# Patient Record
Sex: MALE | Race: WHITE | Employment: OTHER | URBAN - METROPOLITAN AREA
[De-identification: names, ages, dates, MRNs, and addresses within clinical notes are randomized per-mention and may not be internally consistent; named-entity substitution may affect disease eponyms.]

---

## 2017-02-09 ENCOUNTER — TELEPHONE (OUTPATIENT)
Dept: INTERNAL MEDICINE CLINIC | Facility: CLINIC | Age: 62
End: 2017-02-09

## 2017-02-09 DIAGNOSIS — Z12.11 COLON CANCER SCREENING: Primary | ICD-10-CM

## 2017-02-14 ENCOUNTER — TELEPHONE (OUTPATIENT)
Dept: INTERNAL MEDICINE CLINIC | Facility: CLINIC | Age: 62
End: 2017-02-14

## 2017-02-14 RX ORDER — OSELTAMIVIR PHOSPHATE 75 MG/1
75 CAPSULE ORAL 2 TIMES DAILY
Qty: 10 CAPSULE | Refills: 0 | Status: SHIPPED | OUTPATIENT
Start: 2017-02-14 | End: 2017-02-24 | Stop reason: ALTCHOICE

## 2017-02-14 NOTE — TELEPHONE ENCOUNTER
Has a very dry cough, head congestion, and extreme fatigue, no other symptoms.       OSCO----JERI TIMMONS    Will let patient know if meds are called to pharmacy

## 2017-02-20 RX ORDER — MONTELUKAST SODIUM 10 MG/1
TABLET ORAL
Qty: 30 TABLET | Refills: 11 | Status: SHIPPED | OUTPATIENT
Start: 2017-02-20 | End: 2018-02-14

## 2017-02-24 ENCOUNTER — OFFICE VISIT (OUTPATIENT)
Dept: GASTROENTEROLOGY | Facility: CLINIC | Age: 62
End: 2017-02-24

## 2017-02-24 ENCOUNTER — OFFICE VISIT (OUTPATIENT)
Dept: INTERNAL MEDICINE CLINIC | Facility: CLINIC | Age: 62
End: 2017-02-24

## 2017-02-24 ENCOUNTER — HOSPITAL ENCOUNTER (EMERGENCY)
Facility: HOSPITAL | Age: 62
Discharge: HOME | End: 2017-02-25
Attending: EMERGENCY MEDICINE
Payer: COMMERCIAL

## 2017-02-24 ENCOUNTER — TELEPHONE (OUTPATIENT)
Dept: GASTROENTEROLOGY | Facility: CLINIC | Age: 62
End: 2017-02-24

## 2017-02-24 VITALS
SYSTOLIC BLOOD PRESSURE: 180 MMHG | WEIGHT: 251 LBS | BODY MASS INDEX: 32.21 KG/M2 | DIASTOLIC BLOOD PRESSURE: 104 MMHG | HEART RATE: 76 BPM | HEIGHT: 74 IN

## 2017-02-24 VITALS
DIASTOLIC BLOOD PRESSURE: 80 MMHG | HEIGHT: 74 IN | BODY MASS INDEX: 32.21 KG/M2 | SYSTOLIC BLOOD PRESSURE: 160 MMHG | WEIGHT: 251 LBS | TEMPERATURE: 98 F | HEART RATE: 88 BPM

## 2017-02-24 DIAGNOSIS — I10 ESSENTIAL HYPERTENSION, BENIGN: Primary | ICD-10-CM

## 2017-02-24 DIAGNOSIS — Z80.0 FAMILY HISTORY OF COLON CANCER: ICD-10-CM

## 2017-02-24 DIAGNOSIS — I10 ESSENTIAL HYPERTENSION: Primary | ICD-10-CM

## 2017-02-24 DIAGNOSIS — Z12.11 ENCOUNTER FOR SCREENING COLONOSCOPY: Primary | ICD-10-CM

## 2017-02-24 DIAGNOSIS — E11.9 DIABETES MELLITUS WITHOUT COMPLICATION (HCC): ICD-10-CM

## 2017-02-24 LAB
BASOPHILS # BLD: 0 K/UL (ref 0–0.2)
BASOPHILS NFR BLD: 1 %
EOSINOPHIL # BLD: 0.1 K/UL (ref 0–0.7)
EOSINOPHIL NFR BLD: 2 %
ERYTHROCYTE [DISTWIDTH] IN BLOOD BY AUTOMATED COUNT: 12.9 % (ref 11–15)
HCT VFR BLD AUTO: 43.6 % (ref 41–52)
HGB BLD-MCNC: 15 G/DL (ref 13.5–17.5)
LYMPHOCYTES # BLD: 2.3 K/UL (ref 1–4)
LYMPHOCYTES NFR BLD: 34 %
MCH RBC QN AUTO: 30.8 PG (ref 27–32)
MCHC RBC AUTO-ENTMCNC: 34.3 G/DL (ref 32–37)
MCV RBC AUTO: 89.9 FL (ref 80–100)
MONOCYTES # BLD: 0.5 K/UL (ref 0–1)
MONOCYTES NFR BLD: 7 %
NEUTROPHILS # BLD AUTO: 3.9 K/UL (ref 1.8–7.7)
NEUTROPHILS NFR BLD: 56 %
PLATELET # BLD AUTO: 176 K/UL (ref 140–400)
PMV BLD AUTO: 8.3 FL (ref 7.4–10.3)
RBC # BLD AUTO: 4.85 M/UL (ref 4.5–5.9)
WBC # BLD AUTO: 6.9 K/UL (ref 4–11)

## 2017-02-24 PROCEDURE — 80053 COMPREHEN METABOLIC PANEL: CPT | Performed by: EMERGENCY MEDICINE

## 2017-02-24 PROCEDURE — 99284 EMERGENCY DEPT VISIT MOD MDM: CPT

## 2017-02-24 PROCEDURE — 85025 COMPLETE CBC W/AUTO DIFF WBC: CPT | Performed by: EMERGENCY MEDICINE

## 2017-02-24 PROCEDURE — 99212 OFFICE O/P EST SF 10 MIN: CPT | Performed by: INTERNAL MEDICINE

## 2017-02-24 PROCEDURE — 96374 THER/PROPH/DIAG INJ IV PUSH: CPT

## 2017-02-24 PROCEDURE — 96375 TX/PRO/DX INJ NEW DRUG ADDON: CPT

## 2017-02-24 PROCEDURE — 99214 OFFICE O/P EST MOD 30 MIN: CPT | Performed by: INTERNAL MEDICINE

## 2017-02-24 PROCEDURE — 99213 OFFICE O/P EST LOW 20 MIN: CPT | Performed by: PHYSICIAN ASSISTANT

## 2017-02-24 PROCEDURE — 99243 OFF/OP CNSLTJ NEW/EST LOW 30: CPT | Performed by: PHYSICIAN ASSISTANT

## 2017-02-24 PROCEDURE — 80061 LIPID PANEL: CPT | Performed by: EMERGENCY MEDICINE

## 2017-02-24 RX ORDER — LABETALOL HYDROCHLORIDE 5 MG/ML
20 INJECTION, SOLUTION INTRAVENOUS ONCE
Status: COMPLETED | OUTPATIENT
Start: 2017-02-24 | End: 2017-02-24

## 2017-02-24 RX ORDER — VALSARTAN AND HYDROCHLOROTHIAZIDE 320; 25 MG/1; MG/1
1 TABLET, FILM COATED ORAL DAILY
Qty: 30 TABLET | Refills: 11 | Status: SHIPPED | OUTPATIENT
Start: 2017-02-24 | End: 2018-03-12

## 2017-02-24 NOTE — PROGRESS NOTES
HPI:    Patient ID: Vanessa Dunlap is a 57year old male. Hypertension  This is a chronic problem. The current episode started more than 1 year ago. The problem is unchanged. The problem is controlled.  Pertinent negatives include no anxiety, blurred vi MG Oral Tab Take 1 tablet by mouth daily. Disp: 30 tablet Rfl: 11   MONTELUKAST SODIUM 10 MG Oral Tab TAKE 1 TABLET BY MOUTH NIGHTLY. Disp: 30 tablet Rfl: 11   GLIPIZIDE 10 MG Oral Tab TAKE 1 TABLET (10 MG TOTAL) BY MOUTH 2 (TWO) TIMES DAILY BEFORE MEALS. discharge. Left eye exhibits no discharge. No scleral icterus. Neck: Normal range of motion. Neck supple. No JVD present. No tracheal deviation present. No thyromegaly present.    Cardiovascular: Normal rate, regular rhythm, normal heart sounds and intact

## 2017-02-24 NOTE — ASSESSMENT & PLAN NOTE
bp not controlled. Will increase diovan hctz to 320 /25  Recheck in two weeks, cbc cmp lipid hgbaic.
Mother

## 2017-02-24 NOTE — PATIENT INSTRUCTIONS
1. Schedule colonoscopy with Dr. Eleazar Morgan with [facility] (AM procedure, preferably)    2.  bowel prep from [employer] (eRx)    3. Medication adjustment:       A. Day BEFORE colonoscopy: HOLD Metformin and Glipizide      B.  Day OF colonosco

## 2017-02-24 NOTE — H&P
[address] [facility] [address] Gastroenterology                                                                                                  Clinic History and Physical     Pa Past Surgical History    VASECTOMY      COLONOSCOPY  2012    Comment repeat in 2017      Family Hx:   Family History   Problem Relation Age of Onset   • Hypertension Father    • Diabetes Father    • Cancer Father      colon   • Heart Disorder Father Inhale  into the lungs. Disp:  Rfl:    Omega-3 Fatty Acids (OMEGA-3 FISH OIL) 1200 MG Oral Cap Take  by mouth.  Disp:  Rfl:    MetFORMIN HCl ER, OSM, (FORTAMET) 500 MG (OSM) Oral Tablet 24 Hr Take 4 tablets by mouth every evening Disp: 120 tablet Rfl: 10 year-old male patient of Dr. Dileep Crawford with history of HTN, DMII, asthma, migraines, low back pain, seasonal allergies, hx of ulcers and family hx of colon cancer (father) who presents for colon cancer screening evaluation. No anemia noted on lab work. elected to proceed with colonoscopy with intervention [i.e. polypectomy, stent placement, etc.] as indicated. Orders This Visit:  No orders of the defined types were placed in this encounter.        Meds This Visit:    Signed Prescriptions Disp Refills

## 2017-02-24 NOTE — TELEPHONE ENCOUNTER
Please call patient's wife's phone ([phone]) Monday to schedule.  Dr. Shemar morel is in [place] today,    Scheduled for:  [place] [id number]  Date:    Location:    Sedation:  MAC  Time:    Prep: Colyte  Meds/Allergies Reconciled?:  Yes   Diagnosis with code

## 2017-02-25 ENCOUNTER — APPOINTMENT (OUTPATIENT)
Dept: CT IMAGING | Facility: HOSPITAL | Age: 62
End: 2017-02-25
Attending: EMERGENCY MEDICINE
Payer: COMMERCIAL

## 2017-02-25 VITALS
SYSTOLIC BLOOD PRESSURE: 162 MMHG | DIASTOLIC BLOOD PRESSURE: 85 MMHG | HEART RATE: 68 BPM | RESPIRATION RATE: 18 BRPM | HEIGHT: 74 IN | TEMPERATURE: 98 F | WEIGHT: 246 LBS | BODY MASS INDEX: 31.57 KG/M2 | OXYGEN SATURATION: 97 %

## 2017-02-25 LAB
ALBUMIN SERPL BCP-MCNC: 4.2 G/DL (ref 3.5–4.8)
ALBUMIN/GLOB SERPL: 1.5 {RATIO} (ref 1–2)
ALP SERPL-CCNC: 51 U/L (ref 32–100)
ALT SERPL-CCNC: 51 U/L (ref 17–63)
ANION GAP SERPL CALC-SCNC: 9 MMOL/L (ref 0–18)
AST SERPL-CCNC: 38 U/L (ref 15–41)
BILIRUB SERPL-MCNC: 0.8 MG/DL (ref 0.3–1.2)
BUN SERPL-MCNC: 14 MG/DL (ref 8–20)
BUN/CREAT SERPL: 17.5 (ref 10–20)
CALCIUM SERPL-MCNC: 9.5 MG/DL (ref 8.5–10.5)
CHLORIDE SERPL-SCNC: 102 MMOL/L (ref 95–110)
CHOLEST SERPL-MCNC: 183 MG/DL (ref 110–200)
CO2 SERPL-SCNC: 28 MMOL/L (ref 22–32)
CREAT SERPL-MCNC: 0.8 MG/DL (ref 0.5–1.5)
GLOBULIN PLAS-MCNC: 2.8 G/DL (ref 2.5–3.7)
GLUCOSE SERPL-MCNC: 203 MG/DL (ref 70–99)
HDLC SERPL-MCNC: 38 MG/DL
LDLC SERPL CALC-MCNC: 106 MG/DL (ref 0–99)
NONHDLC SERPL-MCNC: 145 MG/DL
OSMOLALITY UR CALC.SUM OF ELEC: 294 MOSM/KG (ref 275–295)
POTASSIUM SERPL-SCNC: 3.7 MMOL/L (ref 3.3–5.1)
PROT SERPL-MCNC: 7 G/DL (ref 5.9–8.4)
SODIUM SERPL-SCNC: 139 MMOL/L (ref 136–144)
TRIGL SERPL-MCNC: 195 MG/DL (ref 1–149)

## 2017-02-25 PROCEDURE — 70450 CT HEAD/BRAIN W/O DYE: CPT

## 2017-02-25 RX ORDER — ACETAMINOPHEN 325 MG/1
650 TABLET ORAL ONCE
Status: COMPLETED | OUTPATIENT
Start: 2017-02-25 | End: 2017-02-25

## 2017-02-25 RX ORDER — HYDRALAZINE HYDROCHLORIDE 20 MG/ML
5 INJECTION INTRAMUSCULAR; INTRAVENOUS ONCE
Status: COMPLETED | OUTPATIENT
Start: 2017-02-25 | End: 2017-02-25

## 2017-02-25 NOTE — ED INITIAL ASSESSMENT (HPI)
Pt was here earlier this morning for HTN, went to PCP, back because its still high and pt reports headaches

## 2017-02-25 NOTE — ED PROVIDER NOTES
Patient Seen in: [facility] Emergency Department    History   Patient presents with:  Hypertension (cardiovascular)    Stated Complaint: high blood pressure     HPI    Patient is a 39-year-old male who presents with diffuse headache ×1 day.   Adan Blood (SEPIDEH CONTOUR TEST) In Vitro Strip,  Test twice a day   SEPIDEH MICROLET LANCETS Does not apply Misc,  Test twice a day   Albuterol Sulfate HFA (PROAIR HFA) 108 (90 BASE) MCG/ACT Inhalation Aero Soln,  Inhale  into the lungs.    alprazolam Pravin Dill) 0.5 motor strength in all extremities, no focal deficits, no pronator drift  SKIN: warm, dry, no rashes        ED Course     Labs Reviewed   COMP METABOLIC PANEL (14) - Abnormal; Notable for the following:     Glucose 203 (*)     All other components within no

## 2017-02-27 ENCOUNTER — TELEPHONE (OUTPATIENT)
Dept: INTERNAL MEDICINE CLINIC | Facility: CLINIC | Age: 62
End: 2017-02-27

## 2017-02-27 ENCOUNTER — OFFICE VISIT (OUTPATIENT)
Dept: INTERNAL MEDICINE CLINIC | Facility: CLINIC | Age: 62
End: 2017-02-27

## 2017-02-27 VITALS
HEART RATE: 80 BPM | WEIGHT: 246 LBS | DIASTOLIC BLOOD PRESSURE: 90 MMHG | BODY MASS INDEX: 31.57 KG/M2 | HEIGHT: 74 IN | SYSTOLIC BLOOD PRESSURE: 150 MMHG

## 2017-02-27 DIAGNOSIS — I10 ESSENTIAL HYPERTENSION, BENIGN: Primary | ICD-10-CM

## 2017-02-27 PROCEDURE — 99212 OFFICE O/P EST SF 10 MIN: CPT | Performed by: INTERNAL MEDICINE

## 2017-02-27 PROCEDURE — 99214 OFFICE O/P EST MOD 30 MIN: CPT | Performed by: INTERNAL MEDICINE

## 2017-02-27 RX ORDER — METOPROLOL SUCCINATE 25 MG/1
25 TABLET, EXTENDED RELEASE ORAL DAILY
Qty: 30 TABLET | Refills: 3 | Status: SHIPPED | OUTPATIENT
Start: 2017-02-27 | End: 2017-03-10 | Stop reason: DRUGHIGH

## 2017-02-27 NOTE — PROGRESS NOTES
HPI:    Patient ID: Angie Sinha is a 57year old male. Hypertension  This is a chronic problem. The current episode started more than 1 year ago. The problem has been gradually improving since onset. The problem is uncontrolled.  Associated symptoms ADULT OR) Take by mouth. Disp:  Rfl:    PEG 3350-KCl-NaBcb-NaCl-NaSulf (COLYTE WITH FLAVOR PACKS) 240 g Oral Recon Soln Take as directed - refer to GI consult notes from 2/24 for complete prep instructions.  Disp: 1 Bottle Rfl: 0   MONTELUKAST SODIUM 10 MG exhibits no discharge. No scleral icterus. Neck: Normal range of motion. Neck supple. No JVD present. No tracheal deviation present. No thyromegaly present. Cardiovascular: Normal rate, regular rhythm, normal heart sounds and intact distal pulses.   Exa

## 2017-03-03 ENCOUNTER — TELEPHONE (OUTPATIENT)
Dept: GASTROENTEROLOGY | Facility: CLINIC | Age: 62
End: 2017-03-03

## 2017-03-03 DIAGNOSIS — Z80.0 FAMILY HISTORY OF COLON CANCER: Primary | ICD-10-CM

## 2017-03-03 DIAGNOSIS — Z12.11 COLON CANCER SCREENING: ICD-10-CM

## 2017-03-04 NOTE — TELEPHONE ENCOUNTER
Scheduled for:  Colonoscopy 645[address]  Date:  4/13/17  Location:  [facility]  Sedation:  MAC  Time:  2613, since this patient is DM I specified on scheduling form for early procedure time.   Prep:  Colyte, patient given instructions at the time of OV  Meds/Allergies R

## 2017-03-10 ENCOUNTER — OFFICE VISIT (OUTPATIENT)
Dept: INTERNAL MEDICINE CLINIC | Facility: CLINIC | Age: 62
End: 2017-03-10

## 2017-03-10 VITALS
TEMPERATURE: 98 F | HEIGHT: 74 IN | SYSTOLIC BLOOD PRESSURE: 140 MMHG | DIASTOLIC BLOOD PRESSURE: 100 MMHG | BODY MASS INDEX: 31.7 KG/M2 | HEART RATE: 76 BPM | WEIGHT: 247 LBS

## 2017-03-10 DIAGNOSIS — I10 ESSENTIAL HYPERTENSION, BENIGN: Primary | ICD-10-CM

## 2017-03-10 DIAGNOSIS — E11.9 DIABETES MELLITUS WITHOUT COMPLICATION (HCC): ICD-10-CM

## 2017-03-10 DIAGNOSIS — Z12.5 PROSTATE CANCER SCREENING: ICD-10-CM

## 2017-03-10 DIAGNOSIS — G47.33 OSA (OBSTRUCTIVE SLEEP APNEA): ICD-10-CM

## 2017-03-10 LAB
ALBUMIN SERPL BCP-MCNC: 4.1 G/DL (ref 3.5–4.8)
ALBUMIN/GLOB SERPL: 1.5 {RATIO} (ref 1–2)
ALP SERPL-CCNC: 49 U/L (ref 32–100)
ALT SERPL-CCNC: 53 U/L (ref 17–63)
ANION GAP SERPL CALC-SCNC: 8 MMOL/L (ref 0–18)
AST SERPL-CCNC: 35 U/L (ref 15–41)
BILIRUB SERPL-MCNC: 0.7 MG/DL (ref 0.3–1.2)
BUN SERPL-MCNC: 15 MG/DL (ref 8–20)
BUN/CREAT SERPL: 16.9 (ref 10–20)
CALCIUM SERPL-MCNC: 8.7 MG/DL (ref 8.5–10.5)
CHLORIDE SERPL-SCNC: 105 MMOL/L (ref 95–110)
CO2 SERPL-SCNC: 27 MMOL/L (ref 22–32)
CREAT SERPL-MCNC: 0.89 MG/DL (ref 0.5–1.5)
GLOBULIN PLAS-MCNC: 2.7 G/DL (ref 2.5–3.7)
GLUCOSE SERPL-MCNC: 265 MG/DL (ref 70–99)
HBA1C MFR BLD: 8.1 % (ref 4–6)
OSMOLALITY UR CALC.SUM OF ELEC: 300 MOSM/KG (ref 275–295)
POTASSIUM SERPL-SCNC: 3.5 MMOL/L (ref 3.3–5.1)
PROT SERPL-MCNC: 6.8 G/DL (ref 5.9–8.4)
PSA SERPL-MCNC: 1.5 NG/ML (ref 0–4)
SODIUM SERPL-SCNC: 140 MMOL/L (ref 136–144)

## 2017-03-10 PROCEDURE — 99214 OFFICE O/P EST MOD 30 MIN: CPT | Performed by: INTERNAL MEDICINE

## 2017-03-10 PROCEDURE — 36415 COLL VENOUS BLD VENIPUNCTURE: CPT | Performed by: INTERNAL MEDICINE

## 2017-03-10 RX ORDER — ONDANSETRON 4 MG/1
4 TABLET, FILM COATED ORAL EVERY 8 HOURS PRN
Qty: 10 TABLET | Refills: 11 | Status: SHIPPED | OUTPATIENT
Start: 2017-03-10 | End: 2019-08-26

## 2017-03-10 RX ORDER — METOPROLOL SUCCINATE 50 MG/1
50 TABLET, EXTENDED RELEASE ORAL DAILY
Qty: 30 TABLET | Refills: 3 | Status: SHIPPED | OUTPATIENT
Start: 2017-03-10 | End: 2017-07-10

## 2017-03-10 NOTE — PROGRESS NOTES
HPI:    Patient ID: Dino Williamson is a 57year old male. Hypertension  This is a chronic problem. The problem is unchanged. The problem is controlled. Associated symptoms include malaise/fatigue.  Pertinent negatives include no anxiety, blurred vision, 4 mg tablet Take 1 tablet (4 mg total) by mouth every 8 (eight) hours as needed for Nausea. Disp: 10 tablet Rfl: 11   aspirin 81 MG Oral Tab Take 81 mg by mouth daily. Disp:  Rfl:    Multiple Vitamins-Minerals (MULTIVITAMIN ADULT OR) Take by mouth.  Disp: well-nourished. No distress. HENT:   Right Ear: External ear normal.   Left Ear: External ear normal.   Nose: Nose normal.   Mouth/Throat: No oropharyngeal exudate. Crowded airways  Neck 19 inches.     Eyes: Conjunctivae and EOM are normal. Pupils are e Ondansetron HCl (ZOFRAN) 4 mg tablet 10 tablet 11      Sig: Take 1 tablet (4 mg total) by mouth every 8 (eight) hours as needed for Nausea.            Imaging & Referrals:  None       PM#[id number]

## 2017-03-16 RX ORDER — GLIMEPIRIDE 4 MG/1
4 TABLET ORAL
Qty: 90 TABLET | Refills: 0 | Status: SHIPPED | OUTPATIENT
Start: 2017-03-16 | End: 2017-06-19

## 2017-03-24 ENCOUNTER — OFFICE VISIT (OUTPATIENT)
Dept: INTERNAL MEDICINE CLINIC | Facility: CLINIC | Age: 62
End: 2017-03-24

## 2017-03-24 VITALS
SYSTOLIC BLOOD PRESSURE: 132 MMHG | BODY MASS INDEX: 31.44 KG/M2 | HEART RATE: 64 BPM | DIASTOLIC BLOOD PRESSURE: 80 MMHG | HEIGHT: 74 IN | TEMPERATURE: 98 F | WEIGHT: 245 LBS

## 2017-03-24 DIAGNOSIS — E11.9 DIABETES MELLITUS WITHOUT COMPLICATION (HCC): ICD-10-CM

## 2017-03-24 DIAGNOSIS — I10 ESSENTIAL HYPERTENSION, BENIGN: Primary | ICD-10-CM

## 2017-03-24 PROCEDURE — 99214 OFFICE O/P EST MOD 30 MIN: CPT | Performed by: INTERNAL MEDICINE

## 2017-03-24 PROCEDURE — 99212 OFFICE O/P EST SF 10 MIN: CPT | Performed by: INTERNAL MEDICINE

## 2017-03-24 NOTE — PROGRESS NOTES
HPI:    Patient ID: Candice Moe is a 57year old male. Hypertension  This is a chronic problem. The current episode started more than 1 year ago. The problem is unchanged. The problem is controlled.  Pertinent negatives include no anxiety, blurred vi total) by mouth every 8 (eight) hours as needed for Nausea. Disp: 10 tablet Rfl: 11   aspirin 81 MG Oral Tab Take 81 mg by mouth daily. Disp:  Rfl:    Multiple Vitamins-Minerals (MULTIVITAMIN ADULT OR) Take by mouth.  Disp:  Rfl:    PEG 3350-KCl-NaBcb-NaCl- and moist. No oropharyngeal exudate. Eyes: Conjunctivae and EOM are normal. Pupils are equal, round, and reactive to light. Right eye exhibits no discharge. Left eye exhibits no discharge. No scleral icterus. Neck: Normal range of motion. Neck supple.

## 2017-04-18 RX ORDER — METFORMIN HYDROCHLORIDE 500 MG/1
TABLET, EXTENDED RELEASE ORAL
Qty: 120 TABLET | Refills: 11 | Status: SHIPPED | OUTPATIENT
Start: 2017-04-18 | End: 2018-03-14

## 2017-05-01 ENCOUNTER — TELEPHONE (OUTPATIENT)
Dept: INTERNAL MEDICINE CLINIC | Facility: CLINIC | Age: 62
End: 2017-05-01

## 2017-05-01 RX ORDER — AZITHROMYCIN 250 MG/1
TABLET, FILM COATED ORAL
Qty: 6 TABLET | Refills: 0 | Status: SHIPPED | OUTPATIENT
Start: 2017-05-01 | End: 2017-06-23 | Stop reason: ALTCHOICE

## 2017-05-18 NOTE — TELEPHONE ENCOUNTER
Entered into EPIC:Recall colon in 7 years per Dr. Micky Ansari. Last Colon done 4/13/17, next due 4/13/2024. Entered on snapshot to reflect [facility] recall date.

## 2017-06-05 RX ORDER — TRAMADOL HYDROCHLORIDE 50 MG/1
TABLET ORAL
Qty: 50 TABLET | Refills: 4 | Status: SHIPPED
Start: 2017-06-05 | End: 2017-12-04

## 2017-06-19 RX ORDER — GLIMEPIRIDE 4 MG/1
TABLET ORAL
Qty: 90 TABLET | Refills: 0 | Status: SHIPPED | OUTPATIENT
Start: 2017-06-19 | End: 2017-06-23

## 2017-06-23 ENCOUNTER — OFFICE VISIT (OUTPATIENT)
Dept: INTERNAL MEDICINE CLINIC | Facility: CLINIC | Age: 62
End: 2017-06-23

## 2017-06-23 VITALS
SYSTOLIC BLOOD PRESSURE: 130 MMHG | RESPIRATION RATE: 16 BRPM | DIASTOLIC BLOOD PRESSURE: 80 MMHG | HEART RATE: 58 BPM | WEIGHT: 237 LBS | BODY MASS INDEX: 30 KG/M2

## 2017-06-23 DIAGNOSIS — I10 ESSENTIAL HYPERTENSION, BENIGN: Primary | ICD-10-CM

## 2017-06-23 DIAGNOSIS — E11.9 DIABETES MELLITUS WITHOUT COMPLICATION (HCC): ICD-10-CM

## 2017-06-23 PROCEDURE — 99212 OFFICE O/P EST SF 10 MIN: CPT | Performed by: INTERNAL MEDICINE

## 2017-06-23 PROCEDURE — 99215 OFFICE O/P EST HI 40 MIN: CPT | Performed by: INTERNAL MEDICINE

## 2017-06-23 RX ORDER — GLIPIZIDE 10 MG/1
1 TABLET ORAL
Refills: 2 | COMMUNITY
Start: 2017-06-12 | End: 2017-10-03

## 2017-06-23 NOTE — PROGRESS NOTES
HPI:    Patient ID: Yahir Bustamante is a 57year old male. Hypertension  This is a chronic problem. The current episode started more than 1 year ago. The problem is unchanged. The problem is controlled.  Pertinent negatives include no anxiety, blurred vi (50 mg total) by mouth daily. Disp: 30 tablet Rfl: 3   aspirin 81 MG Oral Tab Take 81 mg by mouth daily. Disp:  Rfl:    Multiple Vitamins-Minerals (MULTIVITAMIN ADULT OR) Take by mouth.  Disp:  Rfl:    Valsartan-Hydrochlorothiazide 320-25 MG Oral Tab Take 1 icterus. Neck: Normal range of motion. Neck supple. No JVD present. No tracheal deviation present. No thyromegaly present. Cardiovascular: Normal rate, regular rhythm, normal heart sounds and intact distal pulses.   Exam reveals no gallop and no frictio

## 2017-06-27 ENCOUNTER — NURSE ONLY (OUTPATIENT)
Dept: INTERNAL MEDICINE CLINIC | Facility: CLINIC | Age: 62
End: 2017-06-27

## 2017-06-27 DIAGNOSIS — E11.9 DIABETES MELLITUS WITHOUT COMPLICATION (HCC): ICD-10-CM

## 2017-06-27 DIAGNOSIS — I10 ESSENTIAL HYPERTENSION, BENIGN: Primary | ICD-10-CM

## 2017-06-27 PROCEDURE — 36415 COLL VENOUS BLD VENIPUNCTURE: CPT | Performed by: INTERNAL MEDICINE

## 2017-07-06 RX ORDER — GLIMEPIRIDE 4 MG/1
TABLET ORAL
Qty: 90 TABLET | Refills: 3 | Status: SHIPPED | OUTPATIENT
Start: 2017-07-06 | End: 2017-10-06

## 2017-07-11 RX ORDER — METOPROLOL SUCCINATE 50 MG/1
TABLET, EXTENDED RELEASE ORAL
Qty: 30 TABLET | Refills: 11 | Status: SHIPPED | OUTPATIENT
Start: 2017-07-11 | End: 2018-06-08

## 2017-08-31 ENCOUNTER — TELEPHONE (OUTPATIENT)
Dept: INTERNAL MEDICINE CLINIC | Facility: CLINIC | Age: 62
End: 2017-08-31

## 2017-08-31 RX ORDER — AZITHROMYCIN 250 MG/1
TABLET, FILM COATED ORAL
Qty: 6 TABLET | Refills: 0 | Status: SHIPPED | OUTPATIENT
Start: 2017-08-31 | End: 2017-10-03 | Stop reason: ALTCHOICE

## 2017-10-03 ENCOUNTER — NURSE ONLY (OUTPATIENT)
Dept: INTERNAL MEDICINE CLINIC | Facility: CLINIC | Age: 62
End: 2017-10-03

## 2017-10-03 DIAGNOSIS — E78.00 PURE HYPERCHOLESTEROLEMIA: Primary | ICD-10-CM

## 2017-10-03 DIAGNOSIS — E11.9 DIABETES MELLITUS WITHOUT COMPLICATION (HCC): ICD-10-CM

## 2017-10-03 PROCEDURE — 36415 COLL VENOUS BLD VENIPUNCTURE: CPT | Performed by: INTERNAL MEDICINE

## 2017-10-06 ENCOUNTER — TELEPHONE (OUTPATIENT)
Dept: INTERNAL MEDICINE CLINIC | Facility: CLINIC | Age: 62
End: 2017-10-06

## 2017-10-06 NOTE — TELEPHONE ENCOUNTER
Pk Mccullough called because there is some confusion with Amaryl dosing. In lab note from 3/2017 the patient was to stop glipizide and change to Amaryl 4mg twice a day but prescription directions were for once a day.   The patient did not stop taking glipizide,

## 2017-10-09 RX ORDER — GLIMEPIRIDE 4 MG/1
TABLET ORAL
Qty: 180 TABLET | Refills: 3 | Status: SHIPPED | OUTPATIENT
Start: 2017-10-09 | End: 2018-11-08

## 2017-10-16 RX ORDER — AZITHROMYCIN 250 MG/1
TABLET, FILM COATED ORAL
Qty: 6 TABLET | Refills: 0 | OUTPATIENT
Start: 2017-10-16

## 2017-10-17 RX ORDER — SUMATRIPTAN 100 MG/1
TABLET, FILM COATED ORAL
Qty: 9 TABLET | Refills: 10 | Status: SHIPPED | OUTPATIENT
Start: 2017-10-17 | End: 2019-07-28

## 2017-10-20 ENCOUNTER — IMMUNIZATION (OUTPATIENT)
Dept: INTERNAL MEDICINE CLINIC | Facility: CLINIC | Age: 62
End: 2017-10-20

## 2017-10-20 DIAGNOSIS — Z23 NEED FOR VACCINATION: ICD-10-CM

## 2017-10-20 PROCEDURE — 90471 IMMUNIZATION ADMIN: CPT | Performed by: INTERNAL MEDICINE

## 2017-10-20 PROCEDURE — 90686 IIV4 VACC NO PRSV 0.5 ML IM: CPT | Performed by: INTERNAL MEDICINE

## 2017-12-04 RX ORDER — TRAMADOL HYDROCHLORIDE 50 MG/1
TABLET ORAL
Qty: 50 TABLET | Refills: 5 | Status: SHIPPED
Start: 2017-12-04 | End: 2021-03-15

## 2017-12-08 ENCOUNTER — TELEPHONE (OUTPATIENT)
Dept: INTERNAL MEDICINE CLINIC | Facility: CLINIC | Age: 62
End: 2017-12-08

## 2017-12-08 RX ORDER — AZITHROMYCIN 250 MG/1
TABLET, FILM COATED ORAL
Qty: 6 TABLET | Refills: 0 | Status: SHIPPED | OUTPATIENT
Start: 2017-12-08 | End: 2018-02-06 | Stop reason: ALTCHOICE

## 2018-01-02 ENCOUNTER — TELEPHONE (OUTPATIENT)
Dept: INTERNAL MEDICINE CLINIC | Facility: CLINIC | Age: 63
End: 2018-01-02

## 2018-01-02 NOTE — TELEPHONE ENCOUNTER
Cintia Ritchie is asking if taking Turmeric would be beneficial for Bill to take to help with insulin levels?

## 2018-02-06 ENCOUNTER — LAB ENCOUNTER (OUTPATIENT)
Dept: LAB | Age: 63
End: 2018-02-06
Attending: INTERNAL MEDICINE
Payer: COMMERCIAL

## 2018-02-06 ENCOUNTER — OFFICE VISIT (OUTPATIENT)
Dept: INTERNAL MEDICINE CLINIC | Facility: CLINIC | Age: 63
End: 2018-02-06

## 2018-02-06 VITALS
TEMPERATURE: 98 F | WEIGHT: 240 LBS | BODY MASS INDEX: 30.8 KG/M2 | HEIGHT: 74 IN | HEART RATE: 76 BPM | SYSTOLIC BLOOD PRESSURE: 134 MMHG | DIASTOLIC BLOOD PRESSURE: 80 MMHG

## 2018-02-06 DIAGNOSIS — I10 ESSENTIAL HYPERTENSION, BENIGN: ICD-10-CM

## 2018-02-06 DIAGNOSIS — E78.00 PURE HYPERCHOLESTEROLEMIA: ICD-10-CM

## 2018-02-06 DIAGNOSIS — Z00.00 ANNUAL PHYSICAL EXAM: Primary | ICD-10-CM

## 2018-02-06 DIAGNOSIS — E11.9 DIABETES MELLITUS WITHOUT COMPLICATION (HCC): ICD-10-CM

## 2018-02-06 DIAGNOSIS — Z00.00 ANNUAL PHYSICAL EXAM: ICD-10-CM

## 2018-02-06 LAB
ALBUMIN SERPL BCP-MCNC: 4.5 G/DL (ref 3.5–4.8)
ALBUMIN/GLOB SERPL: 1.6 {RATIO} (ref 1–2)
ALP SERPL-CCNC: 44 U/L (ref 32–100)
ALT SERPL-CCNC: 23 U/L (ref 17–63)
ANION GAP SERPL CALC-SCNC: 9 MMOL/L (ref 0–18)
AST SERPL-CCNC: 25 U/L (ref 15–41)
BASOPHILS # BLD: 0.1 K/UL (ref 0–0.2)
BASOPHILS NFR BLD: 1 %
BILIRUB SERPL-MCNC: 0.8 MG/DL (ref 0.3–1.2)
BUN SERPL-MCNC: 15 MG/DL (ref 8–20)
BUN/CREAT SERPL: 16.7 (ref 10–20)
CALCIUM SERPL-MCNC: 9.1 MG/DL (ref 8.5–10.5)
CHLORIDE SERPL-SCNC: 102 MMOL/L (ref 95–110)
CHOLEST SERPL-MCNC: 176 MG/DL (ref 110–200)
CO2 SERPL-SCNC: 29 MMOL/L (ref 22–32)
CREAT SERPL-MCNC: 0.9 MG/DL (ref 0.5–1.5)
CREAT UR-MCNC: 196.6 MG/DL
EOSINOPHIL # BLD: 0.1 K/UL (ref 0–0.7)
EOSINOPHIL NFR BLD: 2 %
ERYTHROCYTE [DISTWIDTH] IN BLOOD BY AUTOMATED COUNT: 12.7 % (ref 11–15)
GLOBULIN PLAS-MCNC: 2.8 G/DL (ref 2.5–3.7)
GLUCOSE SERPL-MCNC: 161 MG/DL (ref 70–99)
HCT VFR BLD AUTO: 45.1 % (ref 41–52)
HDLC SERPL-MCNC: 43 MG/DL
HGB BLD-MCNC: 15.1 G/DL (ref 13.5–17.5)
LDLC SERPL CALC-MCNC: 98 MG/DL (ref 0–99)
LYMPHOCYTES # BLD: 1.6 K/UL (ref 1–4)
LYMPHOCYTES NFR BLD: 27 %
MCH RBC QN AUTO: 30.9 PG (ref 27–32)
MCHC RBC AUTO-ENTMCNC: 33.6 G/DL (ref 32–37)
MCV RBC AUTO: 92 FL (ref 80–100)
MICROALBUMIN UR-MCNC: 14.7 MG/DL (ref 0–1.8)
MICROALBUMIN/CREAT UR: 74.8 MG/G{CREAT} (ref 0–20)
MONOCYTES # BLD: 0.4 K/UL (ref 0–1)
MONOCYTES NFR BLD: 6 %
NEUTROPHILS # BLD AUTO: 3.8 K/UL (ref 1.8–7.7)
NEUTROPHILS NFR BLD: 63 %
NONHDLC SERPL-MCNC: 133 MG/DL
OSMOLALITY UR CALC.SUM OF ELEC: 294 MOSM/KG (ref 275–295)
PATIENT FASTING: YES
PLATELET # BLD AUTO: 178 K/UL (ref 140–400)
PMV BLD AUTO: 8.9 FL (ref 7.4–10.3)
POTASSIUM SERPL-SCNC: 3.6 MMOL/L (ref 3.3–5.1)
PROT SERPL-MCNC: 7.3 G/DL (ref 5.9–8.4)
PSA SERPL-MCNC: 0.9 NG/ML (ref 0–4)
RBC # BLD AUTO: 4.91 M/UL (ref 4.5–5.9)
SODIUM SERPL-SCNC: 140 MMOL/L (ref 136–144)
TRIGL SERPL-MCNC: 174 MG/DL (ref 1–149)
WBC # BLD AUTO: 6 K/UL (ref 4–11)

## 2018-02-06 PROCEDURE — 99396 PREV VISIT EST AGE 40-64: CPT | Performed by: INTERNAL MEDICINE

## 2018-02-06 PROCEDURE — 80061 LIPID PANEL: CPT

## 2018-02-06 PROCEDURE — 82570 ASSAY OF URINE CREATININE: CPT

## 2018-02-06 PROCEDURE — 85025 COMPLETE CBC W/AUTO DIFF WBC: CPT

## 2018-02-06 PROCEDURE — G0328 FECAL BLOOD SCRN IMMUNOASSAY: HCPCS | Performed by: INTERNAL MEDICINE

## 2018-02-06 PROCEDURE — 80053 COMPREHEN METABOLIC PANEL: CPT

## 2018-02-06 PROCEDURE — 36415 COLL VENOUS BLD VENIPUNCTURE: CPT

## 2018-02-06 PROCEDURE — 83036 HEMOGLOBIN GLYCOSYLATED A1C: CPT | Performed by: INTERNAL MEDICINE

## 2018-02-06 PROCEDURE — 82043 UR ALBUMIN QUANTITATIVE: CPT

## 2018-02-06 NOTE — PROGRESS NOTES
HPI:    Patient ID: Candice Moe is a 60year old male. Hypertension   This is a chronic problem. The current episode started more than 1 year ago. The problem is controlled.  Pertinent negatives include no anxiety, blurred vision, chest pain, headach BY MOUTH EVERY DAY Disp: 30 tablet Rfl: 11   SUMATRIPTAN SUCCINATE 100 MG Oral Tab TAKE 1 TABLET (100 MG) BYORAL ROUTE AFTER ONSET OFMIGRAINE; MAY REPEAT AFTER 2 HOURS IF HEADACHERETURNS, NOT TO EXCEED 200MG IN 24HRS        Disp: 9 tablet Rfl: 10   glimepi Pupils are equal, round, and reactive to light. Right eye exhibits no discharge. Left eye exhibits no discharge. No scleral icterus. Neck: Normal range of motion. Neck supple. No JVD present. No tracheal deviation present. No thyromegaly present.    Meño Saucedo Differential W Platelet [E]      Comp Metabolic Panel (14) [E]      Hemoglobin A1C [E]      Lipid Panel [E]      Microalb/Creat Ratio, Random Urine [E]      PSA (Screening) [E]    Meds This Visit:  No prescriptions requested or ordered in this encounter

## 2018-02-07 LAB — HBA1C MFR BLD: 6.4 % (ref 4–6)

## 2018-02-15 RX ORDER — MONTELUKAST SODIUM 10 MG/1
TABLET ORAL
Qty: 30 TABLET | Refills: 6 | Status: SHIPPED | OUTPATIENT
Start: 2018-02-15 | End: 2018-09-06

## 2018-03-13 RX ORDER — VALSARTAN AND HYDROCHLOROTHIAZIDE 320; 25 MG/1; MG/1
1 TABLET, FILM COATED ORAL DAILY
Qty: 30 TABLET | Refills: 5 | Status: SHIPPED | OUTPATIENT
Start: 2018-03-13 | End: 2018-07-30

## 2018-03-16 RX ORDER — METFORMIN HYDROCHLORIDE 500 MG/1
TABLET, EXTENDED RELEASE ORAL
Qty: 120 TABLET | Refills: 5 | Status: SHIPPED | OUTPATIENT
Start: 2018-03-16 | End: 2018-09-17

## 2018-03-19 ENCOUNTER — TELEPHONE (OUTPATIENT)
Dept: FAMILY MEDICINE CLINIC | Facility: CLINIC | Age: 63
End: 2018-03-19

## 2018-03-19 RX ORDER — AZITHROMYCIN 250 MG/1
TABLET, FILM COATED ORAL
Qty: 6 TABLET | Refills: 0 | Status: SHIPPED | OUTPATIENT
Start: 2018-03-19 | End: 2018-07-20

## 2018-04-17 ENCOUNTER — TELEPHONE (OUTPATIENT)
Dept: FAMILY MEDICINE CLINIC | Facility: CLINIC | Age: 63
End: 2018-04-17

## 2018-04-17 RX ORDER — ALBUTEROL SULFATE 90 UG/1
2 AEROSOL, METERED RESPIRATORY (INHALATION)
Qty: 3 INHALER | Refills: 3 | Status: SHIPPED | OUTPATIENT
Start: 2018-04-17 | End: 2019-01-24

## 2018-04-19 ENCOUNTER — TELEPHONE (OUTPATIENT)
Dept: FAMILY MEDICINE CLINIC | Facility: CLINIC | Age: 63
End: 2018-04-19

## 2018-04-19 ENCOUNTER — OFFICE VISIT (OUTPATIENT)
Dept: INTERNAL MEDICINE CLINIC | Facility: CLINIC | Age: 63
End: 2018-04-19

## 2018-04-19 DIAGNOSIS — E11.9 DIABETES MELLITUS WITHOUT COMPLICATION (HCC): Primary | ICD-10-CM

## 2018-04-30 ENCOUNTER — APPOINTMENT (OUTPATIENT)
Dept: LAB | Age: 63
End: 2018-04-30
Attending: INTERNAL MEDICINE
Payer: COMMERCIAL

## 2018-04-30 PROCEDURE — 36415 COLL VENOUS BLD VENIPUNCTURE: CPT | Performed by: INTERNAL MEDICINE

## 2018-04-30 PROCEDURE — 83036 HEMOGLOBIN GLYCOSYLATED A1C: CPT | Performed by: INTERNAL MEDICINE

## 2018-06-08 RX ORDER — METOPROLOL SUCCINATE 50 MG/1
TABLET, EXTENDED RELEASE ORAL
Qty: 30 TABLET | Refills: 10 | Status: SHIPPED | OUTPATIENT
Start: 2018-06-08 | End: 2019-05-01

## 2018-06-29 ENCOUNTER — TELEPHONE (OUTPATIENT)
Dept: FAMILY MEDICINE CLINIC | Facility: CLINIC | Age: 63
End: 2018-06-29

## 2018-06-29 NOTE — TELEPHONE ENCOUNTER
Has a sinus infection, would like a Z-Grayson sent to his pharmacy. Ivania MCGINNIS---MALLORIE DEL TORO

## 2018-07-02 ENCOUNTER — TELEPHONE (OUTPATIENT)
Dept: FAMILY MEDICINE CLINIC | Facility: CLINIC | Age: 63
End: 2018-07-02

## 2018-07-02 NOTE — TELEPHONE ENCOUNTER
This is caused by a virus , it will just need to run its course, and it does cause fatigue. He needs to be checking his sugars to be sure they are not going to low.   When he eats , he should have small portions at a time, , same with fluid intake, should

## 2018-07-02 NOTE — TELEPHONE ENCOUNTER
I spoke with [facility], she understood, patient is drinking diet gatorade already, will check sugars

## 2018-07-02 NOTE — TELEPHONE ENCOUNTER
Patient is no longer having a sinus issue, secretions are clear and no headache or sore throat, diarrhea started Friday night, he has done Krush but did also have some yogurt, applesauce, toast, is starting second bottle of Pepto Bismol, has diarrhea a

## 2018-07-02 NOTE — TELEPHONE ENCOUNTER
Has been having stomach cramping, diarrhea and extreme fatigue, started last week with sinus problems. .      OSCO---MALLORIE STREAM

## 2018-07-20 ENCOUNTER — OFFICE VISIT (OUTPATIENT)
Dept: INTERNAL MEDICINE CLINIC | Facility: CLINIC | Age: 63
End: 2018-07-20

## 2018-07-20 ENCOUNTER — LAB ENCOUNTER (OUTPATIENT)
Dept: LAB | Age: 63
End: 2018-07-20
Attending: INTERNAL MEDICINE
Payer: COMMERCIAL

## 2018-07-20 VITALS
TEMPERATURE: 98 F | WEIGHT: 229 LBS | HEART RATE: 72 BPM | HEIGHT: 74 IN | SYSTOLIC BLOOD PRESSURE: 120 MMHG | BODY MASS INDEX: 29.39 KG/M2 | DIASTOLIC BLOOD PRESSURE: 72 MMHG

## 2018-07-20 DIAGNOSIS — I10 ESSENTIAL HYPERTENSION, BENIGN: Primary | ICD-10-CM

## 2018-07-20 DIAGNOSIS — E78.00 PURE HYPERCHOLESTEROLEMIA: ICD-10-CM

## 2018-07-20 DIAGNOSIS — R10.10 PAIN OF UPPER ABDOMEN: ICD-10-CM

## 2018-07-20 DIAGNOSIS — I10 ESSENTIAL HYPERTENSION, BENIGN: ICD-10-CM

## 2018-07-20 DIAGNOSIS — E11.9 DIABETES MELLITUS WITHOUT COMPLICATION (HCC): ICD-10-CM

## 2018-07-20 LAB
ALBUMIN SERPL BCP-MCNC: 4.2 G/DL (ref 3.5–4.8)
ALBUMIN/GLOB SERPL: 1.5 {RATIO} (ref 1–2)
ALP SERPL-CCNC: 53 U/L (ref 32–100)
ALT SERPL-CCNC: 29 U/L (ref 17–63)
AMYLASE SERPL-CCNC: 52 U/L (ref 24–108)
ANION GAP SERPL CALC-SCNC: 8 MMOL/L (ref 0–18)
AST SERPL-CCNC: 30 U/L (ref 15–41)
BASOPHILS # BLD: 0.1 K/UL (ref 0–0.2)
BASOPHILS NFR BLD: 1 %
BILIRUB SERPL-MCNC: 0.9 MG/DL (ref 0.3–1.2)
BUN SERPL-MCNC: 15 MG/DL (ref 8–20)
BUN/CREAT SERPL: 16.1 (ref 10–20)
CALCIUM SERPL-MCNC: 8.9 MG/DL (ref 8.5–10.5)
CHLORIDE SERPL-SCNC: 103 MMOL/L (ref 95–110)
CHOLEST SERPL-MCNC: 143 MG/DL (ref 110–200)
CO2 SERPL-SCNC: 28 MMOL/L (ref 22–32)
CREAT SERPL-MCNC: 0.93 MG/DL (ref 0.5–1.5)
EOSINOPHIL # BLD: 0.2 K/UL (ref 0–0.7)
EOSINOPHIL NFR BLD: 2 %
ERYTHROCYTE [DISTWIDTH] IN BLOOD BY AUTOMATED COUNT: 13.2 % (ref 11–15)
GLOBULIN PLAS-MCNC: 2.8 G/DL (ref 2.5–3.7)
GLUCOSE SERPL-MCNC: 148 MG/DL (ref 70–99)
HBA1C MFR BLD: 6.5 % (ref 4–6)
HCT VFR BLD AUTO: 45.6 % (ref 41–52)
HDLC SERPL-MCNC: 33 MG/DL
HGB BLD-MCNC: 15.3 G/DL (ref 13.5–17.5)
LDLC SERPL CALC-MCNC: 92 MG/DL (ref 0–99)
LIPASE SERPL-CCNC: 40 U/L (ref 22–51)
LYMPHOCYTES # BLD: 1.4 K/UL (ref 1–4)
LYMPHOCYTES NFR BLD: 21 %
MCH RBC QN AUTO: 30.6 PG (ref 27–32)
MCHC RBC AUTO-ENTMCNC: 33.5 G/DL (ref 32–37)
MCV RBC AUTO: 91.6 FL (ref 80–100)
MONOCYTES # BLD: 0.6 K/UL (ref 0–1)
MONOCYTES NFR BLD: 9 %
NEUTROPHILS # BLD AUTO: 4.4 K/UL (ref 1.8–7.7)
NEUTROPHILS NFR BLD: 68 %
NONHDLC SERPL-MCNC: 110 MG/DL
OSMOLALITY UR CALC.SUM OF ELEC: 292 MOSM/KG (ref 275–295)
PATIENT FASTING: YES
PLATELET # BLD AUTO: 230 K/UL (ref 140–400)
PMV BLD AUTO: 8.5 FL (ref 7.4–10.3)
POTASSIUM SERPL-SCNC: 3.3 MMOL/L (ref 3.3–5.1)
PROT SERPL-MCNC: 7 G/DL (ref 5.9–8.4)
RBC # BLD AUTO: 4.98 M/UL (ref 4.5–5.9)
SODIUM SERPL-SCNC: 139 MMOL/L (ref 136–144)
TRIGL SERPL-MCNC: 89 MG/DL (ref 1–149)
WBC # BLD AUTO: 6.5 K/UL (ref 4–11)

## 2018-07-20 PROCEDURE — 99215 OFFICE O/P EST HI 40 MIN: CPT | Performed by: INTERNAL MEDICINE

## 2018-07-20 PROCEDURE — 83036 HEMOGLOBIN GLYCOSYLATED A1C: CPT | Performed by: INTERNAL MEDICINE

## 2018-07-20 PROCEDURE — 36415 COLL VENOUS BLD VENIPUNCTURE: CPT

## 2018-07-20 PROCEDURE — 82150 ASSAY OF AMYLASE: CPT

## 2018-07-20 PROCEDURE — 85025 COMPLETE CBC W/AUTO DIFF WBC: CPT

## 2018-07-20 PROCEDURE — 80061 LIPID PANEL: CPT

## 2018-07-20 PROCEDURE — 80053 COMPREHEN METABOLIC PANEL: CPT

## 2018-07-20 PROCEDURE — 99212 OFFICE O/P EST SF 10 MIN: CPT | Performed by: INTERNAL MEDICINE

## 2018-07-20 PROCEDURE — 83690 ASSAY OF LIPASE: CPT

## 2018-07-20 RX ORDER — PANTOPRAZOLE SODIUM 40 MG/1
40 TABLET, DELAYED RELEASE ORAL
Qty: 30 TABLET | Refills: 0 | Status: SHIPPED | OUTPATIENT
Start: 2018-07-20 | End: 2018-08-15

## 2018-07-20 NOTE — PROGRESS NOTES
HPI:    Patient ID: Woody Paniagua is a 58year old male. Hypertension   This is a chronic problem. The current episode started more than 1 year ago. The problem is unchanged. The problem is controlled.  Pertinent negatives include no anxiety, blurred v Prescriptions:  Pantoprazole Sodium (PROTONIX) 40 MG Oral Tab EC Take 1 tablet (40 mg total) by mouth every morning before breakfast. Disp: 30 tablet Rfl: 0   METOPROLOL SUCCINATE ER 50 MG Oral Tablet 24 Hr TAKE ONE TABLET BY MOUTH ONE TIME DAILY  Disp: 30 PHYSICAL EXAM:   Physical Exam   Constitutional: He is oriented to person, place, and time. He appears well-developed and well-nourished. No distress.    HENT:   Right Ear: External ear normal.   Left Ear: External ear normal.   Nose: Nose normal Hemoglobin A1C [E]      Lipid Panel [E]      Amylase [E]      Lipase [E]    Meds This Visit:  Signed Prescriptions Disp Refills    Pantoprazole Sodium (PROTONIX) 40 MG Oral Tab EC 30 tablet 0      Sig: Take 1 tablet (40 mg total) by mouth every morning bef

## 2018-07-30 ENCOUNTER — TELEPHONE (OUTPATIENT)
Dept: INTERNAL MEDICINE CLINIC | Facility: CLINIC | Age: 63
End: 2018-07-30

## 2018-07-30 ENCOUNTER — TELEPHONE (OUTPATIENT)
Dept: FAMILY MEDICINE CLINIC | Facility: CLINIC | Age: 63
End: 2018-07-30

## 2018-07-30 DIAGNOSIS — A09 DIARRHEA OF INFECTIOUS ORIGIN: Primary | ICD-10-CM

## 2018-07-30 DIAGNOSIS — R63.4 WEIGHT LOSS: ICD-10-CM

## 2018-07-30 RX ORDER — HYDROCHLOROTHIAZIDE 25 MG/1
25 TABLET ORAL DAILY
Qty: 30 TABLET | Refills: 11 | Status: SHIPPED | OUTPATIENT
Start: 2018-07-30 | End: 2019-02-18

## 2018-07-30 RX ORDER — IRBESARTAN 300 MG/1
300 TABLET ORAL NIGHTLY
Qty: 30 TABLET | Refills: 11 | Status: SHIPPED | OUTPATIENT
Start: 2018-07-30 | End: 2019-02-18

## 2018-07-30 NOTE — TELEPHONE ENCOUNTER
I called and spoke with [facility], she understood, he will probably go to the [facility] site since its closer.

## 2018-07-30 NOTE — TELEPHONE ENCOUNTER
Lenore HOBSON------[phone]    Re: [place]    Still not feeling good since his last office visit, he has lost 12 pounds, extremely fatigued,is miserable, she is inquiring whether a Stool Sample should be done ? Or what you suggest ??

## 2018-07-31 ENCOUNTER — LAB ENCOUNTER (OUTPATIENT)
Dept: LAB | Age: 63
End: 2018-07-31
Attending: INTERNAL MEDICINE
Payer: COMMERCIAL

## 2018-07-31 DIAGNOSIS — A09 DIARRHEA OF INFECTIOUS ORIGIN: ICD-10-CM

## 2018-07-31 LAB
ALBUMIN SERPL BCP-MCNC: 3.9 G/DL (ref 3.5–4.8)
ALBUMIN/GLOB SERPL: 1.5 {RATIO} (ref 1–2)
ALP SERPL-CCNC: 47 U/L (ref 32–100)
ALT SERPL-CCNC: 33 U/L (ref 17–63)
ANION GAP SERPL CALC-SCNC: 11 MMOL/L (ref 0–18)
AST SERPL-CCNC: 34 U/L (ref 15–41)
BASOPHILS # BLD: 0 K/UL (ref 0–0.2)
BASOPHILS NFR BLD: 1 %
BILIRUB SERPL-MCNC: 0.5 MG/DL (ref 0.3–1.2)
BUN SERPL-MCNC: 13 MG/DL (ref 8–20)
BUN/CREAT SERPL: 14.3 (ref 10–20)
CALCIUM SERPL-MCNC: 9.1 MG/DL (ref 8.5–10.5)
CHLORIDE SERPL-SCNC: 103 MMOL/L (ref 95–110)
CO2 SERPL-SCNC: 29 MMOL/L (ref 22–32)
CREAT SERPL-MCNC: 0.91 MG/DL (ref 0.5–1.5)
EOSINOPHIL # BLD: 0.2 K/UL (ref 0–0.7)
EOSINOPHIL NFR BLD: 3 %
ERYTHROCYTE [DISTWIDTH] IN BLOOD BY AUTOMATED COUNT: 13.5 % (ref 11–15)
GLOBULIN PLAS-MCNC: 2.6 G/DL (ref 2.5–3.7)
GLUCOSE SERPL-MCNC: 109 MG/DL (ref 70–99)
HCT VFR BLD AUTO: 44 % (ref 41–52)
HGB BLD-MCNC: 14.8 G/DL (ref 13.5–17.5)
LYMPHOCYTES # BLD: 1.5 K/UL (ref 1–4)
LYMPHOCYTES NFR BLD: 25 %
MCH RBC QN AUTO: 31.1 PG (ref 27–32)
MCHC RBC AUTO-ENTMCNC: 33.6 G/DL (ref 32–37)
MCV RBC AUTO: 92.7 FL (ref 80–100)
MONOCYTES # BLD: 0.5 K/UL (ref 0–1)
MONOCYTES NFR BLD: 8 %
NEUTROPHILS # BLD AUTO: 3.7 K/UL (ref 1.8–7.7)
NEUTROPHILS NFR BLD: 64 %
OSMOLALITY UR CALC.SUM OF ELEC: 297 MOSM/KG (ref 275–295)
PATIENT FASTING: YES
PLATELET # BLD AUTO: 180 K/UL (ref 140–400)
PMV BLD AUTO: 8 FL (ref 7.4–10.3)
POTASSIUM SERPL-SCNC: 3.5 MMOL/L (ref 3.3–5.1)
PROT SERPL-MCNC: 6.5 G/DL (ref 5.9–8.4)
RBC # BLD AUTO: 4.75 M/UL (ref 4.5–5.9)
SODIUM SERPL-SCNC: 143 MMOL/L (ref 136–144)
WBC # BLD AUTO: 5.8 K/UL (ref 4–11)

## 2018-07-31 PROCEDURE — 85025 COMPLETE CBC W/AUTO DIFF WBC: CPT

## 2018-07-31 PROCEDURE — 80053 COMPREHEN METABOLIC PANEL: CPT

## 2018-07-31 PROCEDURE — 36415 COLL VENOUS BLD VENIPUNCTURE: CPT

## 2018-08-01 ENCOUNTER — LAB ENCOUNTER (OUTPATIENT)
Dept: LAB | Age: 63
End: 2018-08-01
Attending: INTERNAL MEDICINE
Payer: COMMERCIAL

## 2018-08-01 DIAGNOSIS — A09 DIARRHEA OF INFECTIOUS ORIGIN: ICD-10-CM

## 2018-08-01 LAB — C DIFF TOX B STL QL: NEGATIVE

## 2018-08-01 PROCEDURE — 87272 CRYPTOSPORIDIUM AG IF: CPT

## 2018-08-01 PROCEDURE — 87045 FECES CULTURE AEROBIC BACT: CPT

## 2018-08-01 PROCEDURE — 87077 CULTURE AEROBIC IDENTIFY: CPT

## 2018-08-01 PROCEDURE — 87329 GIARDIA AG IA: CPT

## 2018-08-01 PROCEDURE — 87493 C DIFF AMPLIFIED PROBE: CPT

## 2018-08-01 PROCEDURE — 87046 STOOL CULTR AEROBIC BACT EA: CPT

## 2018-08-01 PROCEDURE — 87427 SHIGA-LIKE TOXIN AG IA: CPT

## 2018-08-02 LAB
CRYPTOSP AG STL QL IA: NEGATIVE
G LAMBLIA AG STL QL IA: NEGATIVE

## 2018-08-03 ENCOUNTER — TELEPHONE (OUTPATIENT)
Dept: FAMILY MEDICINE CLINIC | Facility: CLINIC | Age: 63
End: 2018-08-03

## 2018-08-03 DIAGNOSIS — R19.7 DIARRHEA, UNSPECIFIED TYPE: Primary | ICD-10-CM

## 2018-08-03 NOTE — TELEPHONE ENCOUNTER
Chiara Hartman calling  Re: Ammon Lyle      Wants to know if all the tests are normal, what is the next step, he is still having the same symptoms. Amrik Swan

## 2018-08-03 NOTE — TELEPHONE ENCOUNTER
Detailed message left with [facility], negative test results, see gastroenterology, asked patient to get back to me via [address]

## 2018-08-03 NOTE — TELEPHONE ENCOUNTER
Call patient , Stool for C Diff is negative, Stool for ova and parasites is Negative, and Stool culture is negative. CBC and CMP were normal   .   If symptoms have persisted he will need to see GI .   I can place a referral.

## 2018-08-03 NOTE — TELEPHONE ENCOUNTER
Modesto Toure    Re: Maite Barr    Had his stool culture tests done on Wednesday, would like results. Louis Hernández

## 2018-08-06 ENCOUNTER — TELEPHONE (OUTPATIENT)
Dept: FAMILY MEDICINE CLINIC | Facility: CLINIC | Age: 63
End: 2018-08-06

## 2018-08-06 NOTE — TELEPHONE ENCOUNTER
I spoke with  Rich Muhammad, she understood, they will see the nurse practitioner on Monday 8/13, to the ER if acute symptoms.

## 2018-08-06 NOTE — H&P
[address] [facility] [address] Gastroenterology                                                                                                  Clinic History and Physical     Pa performed by Dr. Barajas Guardian with MAC r/t family history of colon cancer, findings: Mild early colonic diverticulosis, no polyps, 7 year recall    >30 years ago, EGD performed at unspecified GI practice, findings per patient: PUD?     Social Hx:  - No smoking  + Inhalation Aero Soln Inhale 2 puffs into the lungs every 4 to 6 hours as needed for Wheezing.  Disp: 3 Inhaler Rfl: 3   METFORMIN HCL  MG Oral Tablet 24 Hr TAKE 4 TABLETS BY MOUTH EVERY EVENING Disp: 120 tablet Rfl: 5   MONTELUKAST SODIUM 10 MG Oral T fever  ENDOCRINE:  negative for cold intolerance and heat intolerance  MUSCULOSKELETAL:  negative for joint effusion/severe erythema  BEHAVIOR/PSYCH:  negative for psychotic behavior      PHYSICAL EXAM:   Blood pressure 136/78, pulse 60, height 6' 2\" (1.8 a viral etiology though the duration is longer than one would expect for this. Could consider a trial of probiotics, monitoring the patient's weight, and cautious observation.   If patient continues to have symptoms, possible consideration for repeat endos

## 2018-08-06 NOTE — TELEPHONE ENCOUNTER
Take the appointment with the nurse practitioner. If there are any acute symptoms go to the ER, I.e.  Abdominal pain , light headedness , etc.

## 2018-08-06 NOTE — TELEPHONE ENCOUNTER
Erick Blanco    Re: Riki Breed    Dr Rashmi Crews has no available appt. till Sept 7.  And Dr Nenita Beach has no  appt till Sept 17, he can see a Nurse Practitioner on August 13, she wants to know what to do ???

## 2018-08-13 ENCOUNTER — APPOINTMENT (OUTPATIENT)
Dept: LAB | Facility: HOSPITAL | Age: 63
End: 2018-08-13
Attending: NURSE PRACTITIONER
Payer: COMMERCIAL

## 2018-08-13 ENCOUNTER — OFFICE VISIT (OUTPATIENT)
Dept: GASTROENTEROLOGY | Facility: CLINIC | Age: 63
End: 2018-08-13

## 2018-08-13 VITALS
HEART RATE: 60 BPM | DIASTOLIC BLOOD PRESSURE: 78 MMHG | HEIGHT: 74 IN | WEIGHT: 228.19 LBS | SYSTOLIC BLOOD PRESSURE: 136 MMHG | BODY MASS INDEX: 29.29 KG/M2

## 2018-08-13 DIAGNOSIS — R19.4 ALTERED BOWEL HABITS: Primary | ICD-10-CM

## 2018-08-13 DIAGNOSIS — R19.4 ALTERED BOWEL HABITS: ICD-10-CM

## 2018-08-13 LAB — IGA SERPL-MCNC: 115 MG/DL (ref 68–378)

## 2018-08-13 PROCEDURE — 99212 OFFICE O/P EST SF 10 MIN: CPT | Performed by: NURSE PRACTITIONER

## 2018-08-13 PROCEDURE — 82784 ASSAY IGA/IGD/IGG/IGM EACH: CPT | Performed by: NURSE PRACTITIONER

## 2018-08-13 PROCEDURE — 99213 OFFICE O/P EST LOW 20 MIN: CPT | Performed by: NURSE PRACTITIONER

## 2018-08-13 PROCEDURE — 36415 COLL VENOUS BLD VENIPUNCTURE: CPT | Performed by: NURSE PRACTITIONER

## 2018-08-13 PROCEDURE — 83516 IMMUNOASSAY NONANTIBODY: CPT

## 2018-08-14 ENCOUNTER — APPOINTMENT (OUTPATIENT)
Dept: LAB | Age: 63
End: 2018-08-14
Attending: NURSE PRACTITIONER
Payer: COMMERCIAL

## 2018-08-14 DIAGNOSIS — R19.4 ALTERED BOWEL HABITS: ICD-10-CM

## 2018-08-14 PROCEDURE — 87015 SPECIMEN INFECT AGNT CONCNTJ: CPT

## 2018-08-14 PROCEDURE — 87207 SMEAR SPECIAL STAIN: CPT

## 2018-08-15 LAB
CYCLOSPORA STAIN: NEGATIVE
TTG IGA SER-ACNC: 0.2 U/ML (ref ?–7)

## 2018-08-16 RX ORDER — PANTOPRAZOLE SODIUM 40 MG/1
TABLET, DELAYED RELEASE ORAL
Qty: 30 TABLET | Refills: 5 | Status: SHIPPED | OUTPATIENT
Start: 2018-08-16 | End: 2019-10-21 | Stop reason: ALTCHOICE

## 2018-08-17 ENCOUNTER — TELEPHONE (OUTPATIENT)
Dept: GASTROENTEROLOGY | Facility: CLINIC | Age: 63
End: 2018-08-17

## 2018-08-17 NOTE — TELEPHONE ENCOUNTER
Patient returned call. Name and  verified. Patient informed of results and recommendations. Patient verbalized understanding.

## 2018-08-17 NOTE — TELEPHONE ENCOUNTER
Notes recorded by ELENI Haddad on 8/16/2018 at 12:32 PM CDT  Nursing: Please let the patient know that the Cyclospora test was negative for infection.  We discussed in the office monitoring his weight, a high-fiber diet, a trial of probiotics.   As

## 2018-09-06 RX ORDER — MONTELUKAST SODIUM 10 MG/1
TABLET ORAL
Qty: 30 TABLET | Refills: 11 | Status: SHIPPED | OUTPATIENT
Start: 2018-09-06 | End: 2019-08-23

## 2018-09-17 RX ORDER — METFORMIN HYDROCHLORIDE 500 MG/1
TABLET, EXTENDED RELEASE ORAL
Qty: 120 TABLET | Refills: 11 | Status: SHIPPED | OUTPATIENT
Start: 2018-09-17 | End: 2019-08-23

## 2018-11-08 RX ORDER — GLIMEPIRIDE 4 MG/1
TABLET ORAL
Qty: 180 TABLET | Refills: 2 | Status: SHIPPED | OUTPATIENT
Start: 2018-11-08 | End: 2019-08-04

## 2018-11-13 ENCOUNTER — TELEPHONE (OUTPATIENT)
Dept: FAMILY MEDICINE CLINIC | Facility: CLINIC | Age: 63
End: 2018-11-13

## 2018-11-13 NOTE — TELEPHONE ENCOUNTER
LMTCB regarding lab billing, patient's insurance charges $300 co-pay for each lab performed, can follow up with Gregorio Lorenz per email.

## 2018-11-16 NOTE — TELEPHONE ENCOUNTER
Had conference call with Aren Matthews [facility]) and patient's wife to discuss $300 lab co-pay. Patient's wife instructed to contact [address] [place] [address] to confirm co-payment amounts.  Ralph Doran will pull the call to see what she was quoted and he will check w

## 2018-11-16 NOTE — TELEPHONE ENCOUNTER
Spoke to patient's wife who stated her lab co-payments should be $100.  Email with copies of patient's invoices and employee booklet showing co-pays was emailed to both [facility] ([facility]) and Kendra Lazaro [facility])

## 2018-11-28 NOTE — TELEPHONE ENCOUNTER
Spoke to [facility] who states that patient was billed $300 copay for labs because they were performed in the doctor's office but through

## 2018-12-17 ENCOUNTER — TELEPHONE (OUTPATIENT)
Dept: FAMILY MEDICINE CLINIC | Facility: CLINIC | Age: 63
End: 2018-12-17

## 2018-12-17 RX ORDER — AZITHROMYCIN 250 MG/1
TABLET, FILM COATED ORAL
Qty: 6 TABLET | Refills: 0 | Status: SHIPPED | OUTPATIENT
Start: 2018-12-17 | End: 2019-04-26

## 2018-12-18 NOTE — TELEPHONE ENCOUNTER
Per email from 12/17, Austyn Valdez from [facility] stated that patient's 2018 benefit plan change reduced the patient's copay for labs to $100, patient will be rebilled. Left VM on patient's wife's phone relaying message. No further action required.

## 2019-01-24 ENCOUNTER — TELEPHONE (OUTPATIENT)
Dept: FAMILY MEDICINE CLINIC | Facility: CLINIC | Age: 64
End: 2019-01-24

## 2019-01-24 RX ORDER — ALBUTEROL SULFATE 90 UG/1
2 AEROSOL, METERED RESPIRATORY (INHALATION)
Qty: 3 INHALER | Refills: 3 | Status: SHIPPED | OUTPATIENT
Start: 2019-01-24 | End: 2020-01-06

## 2019-01-25 ENCOUNTER — TELEPHONE (OUTPATIENT)
Dept: FAMILY MEDICINE CLINIC | Facility: CLINIC | Age: 64
End: 2019-01-25

## 2019-01-25 RX ORDER — AZITHROMYCIN 250 MG/1
TABLET, FILM COATED ORAL
Qty: 6 TABLET | Refills: 0 | Status: SHIPPED | OUTPATIENT
Start: 2019-01-25 | End: 2019-04-26 | Stop reason: ALTCHOICE

## 2019-02-18 ENCOUNTER — TELEPHONE (OUTPATIENT)
Dept: FAMILY MEDICINE CLINIC | Facility: CLINIC | Age: 64
End: 2019-02-18

## 2019-02-18 ENCOUNTER — TELEPHONE (OUTPATIENT)
Dept: INTERNAL MEDICINE CLINIC | Facility: CLINIC | Age: 64
End: 2019-02-18

## 2019-02-18 RX ORDER — OLMESARTAN MEDOXOMIL 40 MG/1
40 TABLET ORAL DAILY
Qty: 30 TABLET | Refills: 11 | Status: SHIPPED | OUTPATIENT
Start: 2019-02-18 | End: 2019-02-18 | Stop reason: RX

## 2019-02-18 RX ORDER — OLMESARTAN MEDOXOMIL AND HYDROCHLOROTHIAZIDE 40/25 40; 25 MG/1; MG/1
1 TABLET ORAL DAILY
Qty: 30 TABLET | Refills: 11 | Status: SHIPPED | OUTPATIENT
Start: 2019-02-18 | End: 2019-02-18

## 2019-02-18 RX ORDER — CANDESARTAN CILEXETIL AND HYDROCHLOROTHIAZIDE 32; 25 MG/1; MG/1
TABLET ORAL
Qty: 30 TABLET | Refills: 11 | Status: SHIPPED | OUTPATIENT
Start: 2019-02-18 | End: 2019-02-21

## 2019-02-18 RX ORDER — OLMESARTAN MEDOXOMIL AND HYDROCHLOROTHIAZIDE 40/25 40; 25 MG/1; MG/1
1 TABLET ORAL DAILY
COMMUNITY
End: 2019-02-18

## 2019-02-18 NOTE — TELEPHONE ENCOUNTER
Porsha Rubio - states the Olmesartan/hctz 40-25mg is not available, it was a minute ago, she doesn't  Know why.   Is not suggesting Losartan

## 2019-02-18 NOTE — TELEPHONE ENCOUNTER
[address] called to tell patient that his Irbesartan is on back order, wont be available till May.

## 2019-02-18 NOTE — TELEPHONE ENCOUNTER
[facility] states Olmesartan 40mg is unavailable right now. It is available as olmesartan/hctz 40/25mg. He is taking hctz 25mg.   Please advise

## 2019-02-19 NOTE — TELEPHONE ENCOUNTER
Spoke with Cooper Moore at [facility] Irbesartan in all strengths is on back order--relayed DW addressed yesterday. She does verify receipt of Candesartan-HCTZ Rx below and that DW discontinued Irbesartan yesterday.  No further questions/concer

## 2019-02-21 RX ORDER — LOSARTAN POTASSIUM AND HYDROCHLOROTHIAZIDE 25; 100 MG/1; MG/1
1 TABLET ORAL DAILY
COMMUNITY
End: 2019-02-21

## 2019-02-22 RX ORDER — LOSARTAN POTASSIUM AND HYDROCHLOROTHIAZIDE 25; 100 MG/1; MG/1
1 TABLET ORAL DAILY
Qty: 30 TABLET | Refills: 11 | OUTPATIENT
Start: 2019-02-22 | End: 2020-02-06

## 2019-02-26 ENCOUNTER — TELEPHONE (OUTPATIENT)
Dept: FAMILY MEDICINE CLINIC | Facility: CLINIC | Age: 64
End: 2019-02-26

## 2019-02-26 RX ORDER — CEFUROXIME AXETIL 250 MG/1
250 TABLET ORAL 2 TIMES DAILY
Qty: 20 TABLET | Refills: 0 | Status: SHIPPED | OUTPATIENT
Start: 2019-02-26 | End: 2019-04-15

## 2019-02-26 NOTE — TELEPHONE ENCOUNTER
Has a fever and headache, no other symptoms since Saturday night. Oneal Claude [employer]---MALLORIE DEL TORO

## 2019-02-26 NOTE — TELEPHONE ENCOUNTER
I spoke with the patient, chills happened only once on Saturday late night, his headache is front and sides, no other symptoms at all, his fever at the highest was 100.8 but is normal right now

## 2019-02-26 NOTE — TELEPHONE ENCOUNTER
Call patient for more information , body aches, ? Chills? Swollen glands? Cough? Headache frontal?   Patient was given antibiotics in dec.  And Jan.  Might need to be seen at immediate care, third possible infection without exam.

## 2019-04-15 ENCOUNTER — TELEPHONE (OUTPATIENT)
Dept: FAMILY MEDICINE CLINIC | Facility: CLINIC | Age: 64
End: 2019-04-15

## 2019-04-15 RX ORDER — CEFUROXIME AXETIL 250 MG/1
250 TABLET ORAL 2 TIMES DAILY
Qty: 20 TABLET | Refills: 0 | Status: SHIPPED | OUTPATIENT
Start: 2019-04-15 | End: 2019-10-21 | Stop reason: ALTCHOICE

## 2019-04-26 ENCOUNTER — LAB ENCOUNTER (OUTPATIENT)
Dept: LAB | Age: 64
End: 2019-04-26
Attending: INTERNAL MEDICINE
Payer: COMMERCIAL

## 2019-04-26 ENCOUNTER — OFFICE VISIT (OUTPATIENT)
Dept: INTERNAL MEDICINE CLINIC | Facility: CLINIC | Age: 64
End: 2019-04-26

## 2019-04-26 VITALS
BODY MASS INDEX: 30.29 KG/M2 | HEIGHT: 74 IN | DIASTOLIC BLOOD PRESSURE: 80 MMHG | TEMPERATURE: 99 F | SYSTOLIC BLOOD PRESSURE: 120 MMHG | HEART RATE: 64 BPM | WEIGHT: 236 LBS

## 2019-04-26 DIAGNOSIS — E78.00 PURE HYPERCHOLESTEROLEMIA: ICD-10-CM

## 2019-04-26 DIAGNOSIS — Z12.5 PROSTATE CANCER SCREENING: ICD-10-CM

## 2019-04-26 DIAGNOSIS — J40 BRONCHITIS: ICD-10-CM

## 2019-04-26 DIAGNOSIS — I10 ESSENTIAL HYPERTENSION, BENIGN: ICD-10-CM

## 2019-04-26 DIAGNOSIS — E11.9 DIABETES MELLITUS WITHOUT COMPLICATION (HCC): ICD-10-CM

## 2019-04-26 DIAGNOSIS — I10 ESSENTIAL HYPERTENSION, BENIGN: Primary | ICD-10-CM

## 2019-04-26 PROCEDURE — 36415 COLL VENOUS BLD VENIPUNCTURE: CPT

## 2019-04-26 PROCEDURE — 99212 OFFICE O/P EST SF 10 MIN: CPT | Performed by: INTERNAL MEDICINE

## 2019-04-26 PROCEDURE — 82570 ASSAY OF URINE CREATININE: CPT

## 2019-04-26 PROCEDURE — 80053 COMPREHEN METABOLIC PANEL: CPT

## 2019-04-26 PROCEDURE — 82043 UR ALBUMIN QUANTITATIVE: CPT

## 2019-04-26 PROCEDURE — 99215 OFFICE O/P EST HI 40 MIN: CPT | Performed by: INTERNAL MEDICINE

## 2019-04-26 PROCEDURE — 83036 HEMOGLOBIN GLYCOSYLATED A1C: CPT | Performed by: INTERNAL MEDICINE

## 2019-04-26 PROCEDURE — 85025 COMPLETE CBC W/AUTO DIFF WBC: CPT

## 2019-04-26 PROCEDURE — 80061 LIPID PANEL: CPT

## 2019-04-26 RX ORDER — FLUTICASONE PROPIONATE 50 MCG
2 SPRAY, SUSPENSION (ML) NASAL DAILY
Qty: 1 BOTTLE | Refills: 3 | Status: SHIPPED | OUTPATIENT
Start: 2019-04-26 | End: 2019-10-21 | Stop reason: ALTCHOICE

## 2019-04-26 RX ORDER — CLARITHROMYCIN 500 MG/1
500 TABLET, COATED ORAL 2 TIMES DAILY
Qty: 20 TABLET | Refills: 0 | Status: SHIPPED | OUTPATIENT
Start: 2019-04-26 | End: 2019-10-21 | Stop reason: ALTCHOICE

## 2019-04-26 RX ORDER — METHYLPREDNISOLONE 4 MG/1
TABLET ORAL
Qty: 1 KIT | Refills: 0 | Status: SHIPPED | OUTPATIENT
Start: 2019-04-26 | End: 2019-10-21 | Stop reason: ALTCHOICE

## 2019-04-26 NOTE — PROGRESS NOTES
HPI:    Patient ID: Dilia Ahn is a 58year old male. Sore Throat    This is a new problem. The current episode started 1 to 4 weeks ago. The problem has been unchanged. Neither side of throat is experiencing more pain than the other.  There has bee Suspension 2 sprays by Each Nare route daily. Disp: 1 Bottle Rfl: 3   methylPREDNISolone (MEDROL) 4 MG Oral Tablet Therapy Pack As directed.  Disp: 1 kit Rfl: 0   clarithromycin (BIAXIN) 500 MG Oral Tab Take 1 tablet (500 mg total) by mouth 2 (two) times da BEFORE BREAKFAST Disp: 30 tablet Rfl: 5   TraMADol HCl 50 MG Oral Tab TAKE ONE TABLET BY MOUTH EVERY 6 HOURS AS NEEDED FOR PAIN Disp: 50 tablet Rfl: 5   Glucose Blood (SEPIDEH CONTOUR TEST) In Vitro Strip Test twice a day Disp: 100 each Rfl: 11   SEPIDEH MICRO diaphoretic. No erythema. No pallor. Psychiatric: He has a normal mood and affect. His behavior is normal. Judgment and thought content normal.              ASSESSMENT/PLAN:   Diabetes mellitus without complication ([address]  Labs today .      Essential hyper

## 2019-05-02 RX ORDER — METOPROLOL SUCCINATE 50 MG/1
TABLET, EXTENDED RELEASE ORAL
Qty: 30 TABLET | Refills: 11 | Status: SHIPPED | OUTPATIENT
Start: 2019-05-02 | End: 2020-04-20

## 2019-07-15 ENCOUNTER — TELEPHONE (OUTPATIENT)
Dept: INTERNAL MEDICINE CLINIC | Facility: CLINIC | Age: 64
End: 2019-07-15

## 2019-07-15 DIAGNOSIS — M25.511 CHRONIC PAIN OF BOTH SHOULDERS: Primary | ICD-10-CM

## 2019-07-15 DIAGNOSIS — G89.29 CHRONIC PAIN OF BOTH SHOULDERS: Primary | ICD-10-CM

## 2019-07-15 DIAGNOSIS — M25.512 CHRONIC PAIN OF BOTH SHOULDERS: Primary | ICD-10-CM

## 2019-07-28 RX ORDER — SUMATRIPTAN 100 MG/1
TABLET, FILM COATED ORAL
Qty: 9 TABLET | Refills: 9 | Status: SHIPPED | OUTPATIENT
Start: 2019-07-28 | End: 2020-10-30

## 2019-08-05 ENCOUNTER — HOSPITAL ENCOUNTER (OUTPATIENT)
Dept: CT IMAGING | Facility: HOSPITAL | Age: 64
Discharge: HOME | End: 2019-08-05
Attending: INTERNAL MEDICINE

## 2019-08-05 DIAGNOSIS — Z13.6 SCREENING FOR CARDIOVASCULAR CONDITION: ICD-10-CM

## 2019-08-05 RX ORDER — GLIMEPIRIDE 4 MG/1
TABLET ORAL
Qty: 180 TABLET | Refills: 1 | Status: SHIPPED | OUTPATIENT
Start: 2019-08-05 | End: 2020-01-24

## 2019-08-13 DIAGNOSIS — I25.10 CORONARY ARTERY DISEASE INVOLVING NATIVE CORONARY ARTERY OF NATIVE HEART WITHOUT ANGINA PECTORIS: ICD-10-CM

## 2019-08-13 DIAGNOSIS — E78.00 PURE HYPERCHOLESTEROLEMIA: Primary | ICD-10-CM

## 2019-08-13 RX ORDER — ATORVASTATIN CALCIUM 40 MG/1
40 TABLET, FILM COATED ORAL NIGHTLY
Qty: 90 TABLET | Refills: 3 | Status: SHIPPED | OUTPATIENT
Start: 2019-08-13 | End: 2020-08-07

## 2019-08-23 RX ORDER — METFORMIN HYDROCHLORIDE 500 MG/1
TABLET, EXTENDED RELEASE ORAL
Qty: 120 TABLET | Refills: 10 | Status: SHIPPED | OUTPATIENT
Start: 2019-08-23 | End: 2020-08-03

## 2019-08-23 RX ORDER — MONTELUKAST SODIUM 10 MG/1
TABLET ORAL
Qty: 30 TABLET | Refills: 10 | Status: SHIPPED | OUTPATIENT
Start: 2019-08-23 | End: 2020-08-31

## 2019-08-26 RX ORDER — ONDANSETRON 4 MG/1
TABLET, FILM COATED ORAL
Qty: 10 TABLET | Refills: 8 | Status: SHIPPED | OUTPATIENT
Start: 2019-08-26 | End: 2020-12-12

## 2019-08-29 PROBLEM — M54.2 CERVICALGIA: Status: ACTIVE | Noted: 2019-08-29

## 2019-08-29 PROBLEM — M75.102 ROTATOR CUFF SYNDROME OF LEFT SHOULDER: Status: ACTIVE | Noted: 2019-08-29

## 2019-09-18 PROBLEM — M25.511 RIGHT SHOULDER PAIN, UNSPECIFIED CHRONICITY: Status: ACTIVE | Noted: 2019-07-15

## 2019-09-18 PROBLEM — M25.512 LEFT SHOULDER PAIN, UNSPECIFIED CHRONICITY: Status: ACTIVE | Noted: 2019-07-15

## 2019-10-08 ENCOUNTER — OFFICE VISIT (OUTPATIENT)
Dept: CARDIOLOGY CLINIC | Facility: CLINIC | Age: 64
End: 2019-10-08
Payer: MEDICARE

## 2019-10-08 VITALS
DIASTOLIC BLOOD PRESSURE: 83 MMHG | WEIGHT: 228 LBS | HEART RATE: 63 BPM | BODY MASS INDEX: 29 KG/M2 | SYSTOLIC BLOOD PRESSURE: 163 MMHG | RESPIRATION RATE: 18 BRPM

## 2019-10-08 DIAGNOSIS — I10 ESSENTIAL HYPERTENSION: Primary | ICD-10-CM

## 2019-10-08 DIAGNOSIS — R06.00 DYSPNEA, UNSPECIFIED TYPE: ICD-10-CM

## 2019-10-08 DIAGNOSIS — E78.00 PURE HYPERCHOLESTEROLEMIA: ICD-10-CM

## 2019-10-08 PROCEDURE — 99214 OFFICE O/P EST MOD 30 MIN: CPT | Performed by: NURSE PRACTITIONER

## 2019-10-08 PROCEDURE — 93010 ELECTROCARDIOGRAM REPORT: CPT | Performed by: NURSE PRACTITIONER

## 2019-10-08 PROCEDURE — 93005 ELECTROCARDIOGRAM TRACING: CPT | Performed by: NURSE PRACTITIONER

## 2019-10-08 PROCEDURE — G0463 HOSPITAL OUTPT CLINIC VISIT: HCPCS | Performed by: NURSE PRACTITIONER

## 2019-10-08 NOTE — PROGRESS NOTES
Vanessa Dunlap is a 71year old male. Patient presents with:  Consult    HPI:   Patient comes in today for checkup for elevated calcium score.  He sees Dr. Symone Muniz he has a history of high cholesterol hypertension and diabetes obstructive sleep apnea untre Therapy Pack As directed. Disp: 1 kit Rfl: 0   clarithromycin (BIAXIN) 500 MG Oral Tab Take 1 tablet (500 mg total) by mouth 2 (two) times daily.  Disp: 20 tablet Rfl: 0   Cefuroxime Axetil (CEFTIN) 250 MG Oral Tab Take 1 tablet (250 mg total) by mouth 2 (t skin lesions or rashes  RESPIRATORY: denies shortness of breath with exertion  CARDIOVASCULAR: no chest pain  GI: denies abdominal pain and denies heartburn  NEURO: denies headaches    EXAM:   BP (!) 163/83 (BP Location: Left arm, Patient Position: Sitting

## 2019-10-16 ENCOUNTER — HOSPITAL ENCOUNTER (OUTPATIENT)
Dept: CV DIAGNOSTICS | Facility: HOSPITAL | Age: 64
Discharge: HOME | End: 2019-10-16
Attending: NURSE PRACTITIONER
Payer: MEDICARE

## 2019-10-16 ENCOUNTER — HOSPITAL ENCOUNTER (OUTPATIENT)
Dept: NUCLEAR MEDICINE | Facility: HOSPITAL | Age: 64
Discharge: HOME | End: 2019-10-16
Attending: NURSE PRACTITIONER
Payer: MEDICARE

## 2019-10-16 DIAGNOSIS — I10 ESSENTIAL HYPERTENSION: ICD-10-CM

## 2019-10-16 DIAGNOSIS — R06.00 DYSPNEA, UNSPECIFIED TYPE: ICD-10-CM

## 2019-10-16 PROCEDURE — 93017 CV STRESS TEST TRACING ONLY: CPT | Performed by: NURSE PRACTITIONER

## 2019-10-16 PROCEDURE — 93016 CV STRESS TEST SUPVJ ONLY: CPT | Performed by: NURSE PRACTITIONER

## 2019-10-16 PROCEDURE — 78452 HT MUSCLE IMAGE SPECT MULT: CPT | Performed by: NURSE PRACTITIONER

## 2019-10-16 PROCEDURE — 93018 CV STRESS TEST I&R ONLY: CPT | Performed by: NURSE PRACTITIONER

## 2019-10-16 RX ORDER — SODIUM CHLORIDE 9 MG/ML
INJECTION, SOLUTION INTRAVENOUS
Status: COMPLETED
Start: 2019-10-16 | End: 2019-10-16

## 2019-10-16 RX ADMIN — SODIUM CHLORIDE 100 ML: 9 INJECTION, SOLUTION INTRAVENOUS at 09:20:00

## 2019-10-21 ENCOUNTER — OFFICE VISIT (OUTPATIENT)
Dept: INTERNAL MEDICINE CLINIC | Facility: CLINIC | Age: 64
End: 2019-10-21
Payer: MEDICARE

## 2019-10-21 ENCOUNTER — LAB ENCOUNTER (OUTPATIENT)
Dept: LAB | Age: 64
End: 2019-10-21
Attending: INTERNAL MEDICINE
Payer: MEDICARE

## 2019-10-21 VITALS
HEIGHT: 74 IN | WEIGHT: 224 LBS | DIASTOLIC BLOOD PRESSURE: 80 MMHG | SYSTOLIC BLOOD PRESSURE: 135 MMHG | BODY MASS INDEX: 28.75 KG/M2 | TEMPERATURE: 98 F | HEART RATE: 56 BPM

## 2019-10-21 DIAGNOSIS — E78.00 PURE HYPERCHOLESTEROLEMIA: ICD-10-CM

## 2019-10-21 DIAGNOSIS — I10 ESSENTIAL HYPERTENSION, BENIGN: ICD-10-CM

## 2019-10-21 DIAGNOSIS — I10 ESSENTIAL HYPERTENSION, BENIGN: Primary | ICD-10-CM

## 2019-10-21 DIAGNOSIS — G47.33 OBSTRUCTIVE SLEEP APNEA SYNDROME: ICD-10-CM

## 2019-10-21 DIAGNOSIS — E11.9 DIABETES MELLITUS WITHOUT COMPLICATION (HCC): ICD-10-CM

## 2019-10-21 PROBLEM — J44.9 ASTHMA WITH COPD (CHRONIC OBSTRUCTIVE PULMONARY DISEASE) (HCC): Chronic | Status: ACTIVE | Noted: 2019-10-21

## 2019-10-21 PROBLEM — J44.89 ASTHMA WITH COPD (CHRONIC OBSTRUCTIVE PULMONARY DISEASE) (HCC): Chronic | Status: ACTIVE | Noted: 2019-10-21

## 2019-10-21 PROBLEM — J44.89 ASTHMA WITH COPD (CHRONIC OBSTRUCTIVE PULMONARY DISEASE): Chronic | Status: ACTIVE | Noted: 2019-10-21

## 2019-10-21 PROBLEM — J44.9 ASTHMA WITH COPD (CHRONIC OBSTRUCTIVE PULMONARY DISEASE): Chronic | Status: ACTIVE | Noted: 2019-10-21

## 2019-10-21 PROCEDURE — G0008 ADMIN INFLUENZA VIRUS VAC: HCPCS | Performed by: INTERNAL MEDICINE

## 2019-10-21 PROCEDURE — 85025 COMPLETE CBC W/AUTO DIFF WBC: CPT

## 2019-10-21 PROCEDURE — 36415 COLL VENOUS BLD VENIPUNCTURE: CPT

## 2019-10-21 PROCEDURE — 80061 LIPID PANEL: CPT

## 2019-10-21 PROCEDURE — 99215 OFFICE O/P EST HI 40 MIN: CPT | Performed by: INTERNAL MEDICINE

## 2019-10-21 PROCEDURE — 80053 COMPREHEN METABOLIC PANEL: CPT

## 2019-10-21 PROCEDURE — 83036 HEMOGLOBIN GLYCOSYLATED A1C: CPT | Performed by: INTERNAL MEDICINE

## 2019-10-21 PROCEDURE — 90662 IIV NO PRSV INCREASED AG IM: CPT | Performed by: INTERNAL MEDICINE

## 2019-10-21 NOTE — PROGRESS NOTES
HPI:    Patient ID: Yamilka Lubin. is a 71year old male. HPIpatient needs flushot , labs today . Patient states that he is doing well, but he snores at night, has apnea per wife, has daytime fatigue, nocturia, neck is 18 inches.  Headaches during Take 1 tablet (40 mg total) by mouth nightly., Disp: 90 tablet, Rfl: 3  GLIMEPIRIDE 4 MG Oral Tab, TAKE ONE TABLET BY MOUTH IN THE A.M. AND ONE IN THE EVENING, Disp: 180 tablet, Rfl: 1  METOPROLOL SUCCINATE ER 50 MG Oral Tablet 24 Hr, TAKE ONE TABLET BY MO and well-nourished. No distress. HENT:   Right Ear: External ear normal.   Left Ear: External ear normal.   Nose: Nose normal.   Mouth/Throat: Oropharynx is clear and moist. No oropharyngeal exudate. Crowded airway .     Eyes: Pupils are equal, round, a yr and up [43153]      Meds This Visit:  Requested Prescriptions      No prescriptions requested or ordered in this encounter       Imaging & Referrals:  FLU VACC HIGH DOSE PRSV FREE  OP [facility] ALT REFERRAL DIAGOSTIC SLEEP STUDY ADULT       #[id number]

## 2019-10-28 ENCOUNTER — OFFICE VISIT (OUTPATIENT)
Dept: SLEEP CENTER | Age: 64
End: 2019-10-28
Attending: INTERNAL MEDICINE
Payer: MEDICARE

## 2019-10-28 DIAGNOSIS — G47.33 OBSTRUCTIVE SLEEP APNEA SYNDROME: ICD-10-CM

## 2019-10-28 DIAGNOSIS — Z76.89 SLEEP CONCERN: Primary | ICD-10-CM

## 2019-10-28 PROCEDURE — 95810 POLYSOM 6/> YRS 4/> PARAM: CPT

## 2019-11-04 ENCOUNTER — TELEPHONE (OUTPATIENT)
Dept: INTERNAL MEDICINE CLINIC | Facility: CLINIC | Age: 64
End: 2019-11-04

## 2019-11-04 NOTE — TELEPHONE ENCOUNTER
Patient called back and is in agreement with moving forward with a CPAP titration study. Please enter orders.

## 2019-11-04 NOTE — PROCEDURES
[address] [facility]  Accredited by the [place] of Sleep [employer] ([employer]ASM)    PATIENT'S NAME: Linda Davidson JR   ATTENDING PHYSICIAN: Stephanie London. Sis Bowen MD   REFERRING PHYSICIAN: Stephanie London.  Sis Bowen MD   PATIENT ACCOUNT #: [de-identified] LOCATION: S index for the entire night was 46 events an hour. ASSESSMENT:    1. Severe obstructive sleep apnea with apnea-hypopnea index of 46 events an hour. 2.   Severe desaturations. 3.   Periodic leg movements of sleep.     4.   Severe sleep fragmentation

## 2019-11-09 ENCOUNTER — OFFICE VISIT (OUTPATIENT)
Dept: SLEEP CENTER | Age: 64
End: 2019-11-09
Attending: INTERNAL MEDICINE
Payer: MEDICARE

## 2019-11-09 DIAGNOSIS — G47.33 OBSTRUCTIVE SLEEP APNEA SYNDROME: ICD-10-CM

## 2019-11-09 PROCEDURE — 95811 POLYSOM 6/>YRS CPAP 4/> PARM: CPT

## 2019-11-15 NOTE — PROCEDURES
[address] [facility]  Accredited by the [facility] Sleep Medicine ([facility])    PATIENT'S NAME: Michelle Stuart JR   ATTENDING PHYSICIAN: Solo Estes. Humble Benítez MD   REFERRING PHYSICIAN: Solo Estes.  Humble Benítez MD   PATIENT ACCOUNT #: [de-identified] LOCATION: S lesions of upper airways. Dictated By Caitie Kingston MD  d: 11/15/2019 10:36:56  t: 11/15/2019 12:49:11  [facility] [id number]  [facility]/    cc: Armando Paredes.  Quin Grover MD

## 2019-11-17 NOTE — TELEPHONE ENCOUNTER
Patient Seen in: [facility]      History   Patient presents with:  Mouth/Lip Problem    Stated Complaint: Mouth Pain    HPI    This 54-year-old female presents to the office with complaint of sores to the lips  Which have been w Porsha called-----[phone]    Re: Candesartan is to expensive, and if he could have the Losartan HCTZ ? ? Ventral hernia 2002   • Visual impairment     glasses   • Wears glasses               Past Surgical History:   Procedure Laterality Date   • BURSA INJECTION Left 7/30/2015    Performed by Anayeli Bucio MD at [place] [address] at [address] [place] [address] (PAIN) N/A 3/26/2015    Performed by Carlos Araiza MD at [address]      partial, at age 28   • LAPAROSCOPIC CHOLECYSTECTOMY  4/2010   • Selvin Use      Smoking status: Former Smoker        Packs/day: 1.00        Years: 32.00        Pack years: 28        Quit date: 3/29/2008        Years since quittin.6      Smokeless tobacco: Never Used    Alcohol use: No      Alcohol/week: 0.0 standard drin Clinical Impression:  Mouth sores  (primary encounter diagnosis)    Disposition:  Discharge  11/17/2019  3:45 pm    Follow-up:  Mag Mcelroy MD  [address] [place] [address]  [address] [facility]  [phone]    Schedule an appointment as soon as p

## 2019-12-03 ENCOUNTER — TELEPHONE (OUTPATIENT)
Dept: FAMILY MEDICINE CLINIC | Facility: CLINIC | Age: 64
End: 2019-12-03

## 2019-12-03 NOTE — TELEPHONE ENCOUNTER
Yary Daniels [facility] [address] [facility]--[phone]    Fax---[phone]      Requesting Face to Face notes and any relating to his ALEX,    Also patient called that he has not received his C-pap

## 2019-12-03 NOTE — TELEPHONE ENCOUNTER
I spoke with Josy Lyles, she did receive all completed forms and orders, wanted to confirm.   Last F2F notes from 10/21/19 faxed to her [phone]

## 2019-12-04 VITALS — BODY MASS INDEX: 28.62 KG/M2 | WEIGHT: 223 LBS | HEIGHT: 74 IN

## 2019-12-06 ENCOUNTER — APPOINTMENT (OUTPATIENT)
Dept: GENERAL RADIOLOGY | Facility: HOSPITAL | Age: 64
End: 2019-12-06
Attending: ANESTHESIOLOGY
Payer: MEDICARE

## 2019-12-06 ENCOUNTER — HOSPITAL ENCOUNTER (OUTPATIENT)
Facility: HOSPITAL | Age: 64
Setting detail: HOSPITAL OUTPATIENT SURGERY
Discharge: HOME | End: 2019-12-06
Attending: ANESTHESIOLOGY | Admitting: ANESTHESIOLOGY
Payer: MEDICARE

## 2019-12-06 PROCEDURE — B01B1ZZ FLUOROSCOPY OF SPINAL CORD USING LOW OSMOLAR CONTRAST: ICD-10-PCS | Performed by: ANESTHESIOLOGY

## 2019-12-06 PROCEDURE — 3E0R33Z INTRODUCTION OF ANTI-INFLAMMATORY INTO SPINAL CANAL, PERCUTANEOUS APPROACH: ICD-10-PCS | Performed by: ANESTHESIOLOGY

## 2019-12-06 RX ORDER — TRIAMCINOLONE ACETONIDE 40 MG/ML
INJECTION, SUSPENSION INTRA-ARTICULAR; INTRAMUSCULAR AS NEEDED
Status: DISCONTINUED | OUTPATIENT
Start: 2019-12-06 | End: 2019-12-06 | Stop reason: HOSPADM

## 2019-12-06 RX ORDER — LIDOCAINE HYDROCHLORIDE 10 MG/ML
INJECTION, SOLUTION EPIDURAL; INFILTRATION; INTRACAUDAL; PERINEURAL AS NEEDED
Status: DISCONTINUED | OUTPATIENT
Start: 2019-12-06 | End: 2019-12-06 | Stop reason: HOSPADM

## 2019-12-06 NOTE — H&P
Patient complains of neck and left shoulder pain. 71 yr old male with burning, and aching neck/left shoulder pain for 3 months, insidious onset. Pain is worse with sitting, standing, walking, long car rides. No changes with bending forward.   Notes some nightly  METFORMIN HCL  MG Oral Tablet 24 Hr, TAKE 4 TABLETS BY MOUTH EVERY EVENING  atorvastatin (LIPITOR) 40 MG Oral Tab, Take 1 tablet (40 mg total) by mouth nightly.   GLIMEPIRIDE 4 MG Oral Tab, TAKE ONE TABLET BY MOUTH IN THE A.M. AND ONE IN THE 2019 radiographs  TECHNIQUE: Sagittal and axial T2, sagittal T1, and sagittal STIR sequences were obtained through the  cervical spine. FINDINGS:     Straightening of the normal cervical lordosis may be due to muscle spasm or positioning.  The  cervical ve extrusion with 4 mm of inferior migration, a  diffuse disc osteophyte complex, and ligamentum flavum thickening. There is resultant moderate  central spinal canal stenosis and mild cord compression with narrowing of the AP dimension of the  canal to 7 mm. thoracic facets, paraspinal musculature, trapezius, or scapulae  Palpation of Right (+ or -) Left (+ or -)   Cervical Facets - -   Thoracic Facets - -   Paraspinal - -   Trapezius - -   Scapula - -      DTR:  DTR grossly intact throughout bilateral upper e

## 2019-12-06 NOTE — OPERATIVE REPORT
PATIENT NAME: Nasir Barros   : 7/10/1955  MRN: [id number]   DATE OF OPERATION: 2019      PREOPERATIVE DIAGNOSIS: neuroforaminal stenosis cervical spine, cervical radiculopathy    POSTOPERATIVE DIAGNOSIS: same as preop    PROCEDURE PERFORMED: Cerv All needles were removed intact and band-aids applied to injection sites. Discharge instructions were reviewed with the patient. Patient verbalizes understanding. he will follow up in 2 weeks for Discussion of alternative options.   Informed patient kary

## 2019-12-12 ENCOUNTER — TELEPHONE (OUTPATIENT)
Dept: FAMILY MEDICINE CLINIC | Facility: CLINIC | Age: 64
End: 2019-12-12

## 2019-12-12 NOTE — TELEPHONE ENCOUNTER
I called [facility] again. After many transfers through the system, they will call the patient now.

## 2019-12-23 ENCOUNTER — TELEPHONE (OUTPATIENT)
Dept: FAMILY MEDICINE CLINIC | Facility: CLINIC | Age: 64
End: 2019-12-23

## 2019-12-23 RX ORDER — AZITHROMYCIN 250 MG/1
TABLET, FILM COATED ORAL
Qty: 6 TABLET | Refills: 0 | Status: SHIPPED | OUTPATIENT
Start: 2019-12-23 | End: 2020-07-09 | Stop reason: ALTCHOICE

## 2020-01-06 ENCOUNTER — TELEPHONE (OUTPATIENT)
Dept: FAMILY MEDICINE CLINIC | Facility: CLINIC | Age: 65
End: 2020-01-06

## 2020-01-06 RX ORDER — ALBUTEROL SULFATE 90 UG/1
2 AEROSOL, METERED RESPIRATORY (INHALATION)
Qty: 3 INHALER | Refills: 3 | Status: SHIPPED | OUTPATIENT
Start: 2020-01-06

## 2020-01-24 RX ORDER — GLIMEPIRIDE 4 MG/1
TABLET ORAL
Qty: 180 TABLET | Refills: 3 | Status: SHIPPED | OUTPATIENT
Start: 2020-01-24 | End: 2021-01-21

## 2020-02-06 RX ORDER — LOSARTAN POTASSIUM AND HYDROCHLOROTHIAZIDE 25; 100 MG/1; MG/1
TABLET ORAL
Qty: 30 TABLET | Refills: 11 | Status: SHIPPED | OUTPATIENT
Start: 2020-02-06 | End: 2021-02-07

## 2020-02-17 ENCOUNTER — TELEPHONE (OUTPATIENT)
Dept: INTERNAL MEDICINE CLINIC | Facility: CLINIC | Age: 65
End: 2020-02-17

## 2020-04-03 ENCOUNTER — TELEPHONE (OUTPATIENT)
Dept: INTERNAL MEDICINE CLINIC | Facility: CLINIC | Age: 65
End: 2020-04-03

## 2020-04-03 DIAGNOSIS — G47.33 OBSTRUCTIVE SLEEP APNEA SYNDROME: Primary | ICD-10-CM

## 2020-04-03 DIAGNOSIS — E11.9 DIABETES MELLITUS WITHOUT COMPLICATION (HCC): ICD-10-CM

## 2020-04-03 PROCEDURE — G2012 BRIEF CHECK IN BY MD/QHP: HCPCS | Performed by: INTERNAL MEDICINE

## 2020-04-03 NOTE — TELEPHONE ENCOUNTER
Virtual/Telephone Check-In    Abi Lubin. verbally consents to a Virtual/Telephone Check-In service on 04/03/20. Patient understands and accepts financial responsibility for any deductible, co-insurance and/or co-pays associated with this service.

## 2020-04-03 NOTE — ASSESSMENT & PLAN NOTE
Patient doing well, glucose control at home is less than 130 , patient will come in for hgbaic once the current viral situation has cleared.

## 2020-04-03 NOTE — ASSESSMENT & PLAN NOTE
Patient feels better with the use of CPAP, less fatigue, increased energy , less daytime sleepiness. Patient snores less and awakens more refreshed. He has been compliant with use.

## 2020-04-20 RX ORDER — METOPROLOL SUCCINATE 50 MG/1
TABLET, EXTENDED RELEASE ORAL
Qty: 30 TABLET | Refills: 11 | Status: SHIPPED | OUTPATIENT
Start: 2020-04-20 | End: 2021-05-24

## 2020-07-09 ENCOUNTER — OFFICE VISIT (OUTPATIENT)
Dept: INTERNAL MEDICINE CLINIC | Facility: CLINIC | Age: 65
End: 2020-07-09
Payer: MEDICARE

## 2020-07-09 VITALS
DIASTOLIC BLOOD PRESSURE: 80 MMHG | HEART RATE: 64 BPM | TEMPERATURE: 98 F | WEIGHT: 234 LBS | BODY MASS INDEX: 30.03 KG/M2 | SYSTOLIC BLOOD PRESSURE: 120 MMHG | HEIGHT: 74 IN

## 2020-07-09 DIAGNOSIS — Z12.5 PROSTATE CANCER SCREENING: ICD-10-CM

## 2020-07-09 DIAGNOSIS — E11.9 DIABETES MELLITUS WITHOUT COMPLICATION (HCC): ICD-10-CM

## 2020-07-09 DIAGNOSIS — E78.00 PURE HYPERCHOLESTEROLEMIA: ICD-10-CM

## 2020-07-09 DIAGNOSIS — I10 ESSENTIAL HYPERTENSION, BENIGN: ICD-10-CM

## 2020-07-09 DIAGNOSIS — Z00.00 MEDICARE ANNUAL WELLNESS VISIT, INITIAL: Primary | ICD-10-CM

## 2020-07-09 PROBLEM — R63.4 WEIGHT LOSS: Status: RESOLVED | Noted: 2018-07-30 | Resolved: 2020-07-09

## 2020-07-09 PROBLEM — M54.2 CERVICALGIA: Status: RESOLVED | Noted: 2019-08-29 | Resolved: 2020-07-09

## 2020-07-09 PROBLEM — M25.511 RIGHT SHOULDER PAIN, UNSPECIFIED CHRONICITY: Status: RESOLVED | Noted: 2019-07-15 | Resolved: 2020-07-09

## 2020-07-09 PROBLEM — I25.10 CORONARY ARTERY DISEASE INVOLVING NATIVE CORONARY ARTERY OF NATIVE HEART WITHOUT ANGINA PECTORIS: Status: RESOLVED | Noted: 2019-08-13 | Resolved: 2020-07-09

## 2020-07-09 PROBLEM — J40 BRONCHITIS: Status: RESOLVED | Noted: 2019-04-26 | Resolved: 2020-07-09

## 2020-07-09 PROBLEM — J44.89 ASTHMA WITH COPD (CHRONIC OBSTRUCTIVE PULMONARY DISEASE): Chronic | Status: RESOLVED | Noted: 2019-10-21 | Resolved: 2020-07-09

## 2020-07-09 PROBLEM — J44.89 ASTHMA WITH COPD (CHRONIC OBSTRUCTIVE PULMONARY DISEASE) (HCC): Chronic | Status: RESOLVED | Noted: 2019-10-21 | Resolved: 2020-07-09

## 2020-07-09 PROBLEM — M75.102 ROTATOR CUFF SYNDROME OF LEFT SHOULDER: Status: RESOLVED | Noted: 2019-08-29 | Resolved: 2020-07-09

## 2020-07-09 PROBLEM — J44.9 ASTHMA WITH COPD (CHRONIC OBSTRUCTIVE PULMONARY DISEASE) (HCC): Chronic | Status: RESOLVED | Noted: 2019-10-21 | Resolved: 2020-07-09

## 2020-07-09 PROBLEM — G47.33 OBSTRUCTIVE SLEEP APNEA SYNDROME: Status: RESOLVED | Noted: 2019-10-21 | Resolved: 2020-07-09

## 2020-07-09 PROBLEM — R19.7 DIARRHEA: Status: RESOLVED | Noted: 2018-08-03 | Resolved: 2020-07-09

## 2020-07-09 PROBLEM — A09 DIARRHEA OF INFECTIOUS ORIGIN: Status: RESOLVED | Noted: 2018-07-30 | Resolved: 2020-07-09

## 2020-07-09 PROBLEM — R10.10 PAIN OF UPPER ABDOMEN: Status: RESOLVED | Noted: 2018-07-20 | Resolved: 2020-07-09

## 2020-07-09 PROBLEM — J44.9 ASTHMA WITH COPD (CHRONIC OBSTRUCTIVE PULMONARY DISEASE): Chronic | Status: RESOLVED | Noted: 2019-10-21 | Resolved: 2020-07-09

## 2020-07-09 PROBLEM — G47.33 OSA (OBSTRUCTIVE SLEEP APNEA): Status: RESOLVED | Noted: 2017-03-10 | Resolved: 2020-07-09

## 2020-07-09 LAB
ALBUMIN SERPL-MCNC: 4 G/DL (ref 3.4–5)
ALBUMIN/GLOB SERPL: 1.1 {RATIO} (ref 1–2)
ALP LIVER SERPL-CCNC: 56 U/L (ref 45–117)
ALT SERPL-CCNC: 30 U/L (ref 16–61)
ANION GAP SERPL CALC-SCNC: 6 MMOL/L (ref 0–18)
AST SERPL-CCNC: 23 U/L (ref 15–37)
BASOPHILS # BLD AUTO: 0.02 X10(3) UL (ref 0–0.2)
BASOPHILS NFR BLD AUTO: 0.4 %
BILIRUB SERPL-MCNC: 0.7 MG/DL (ref 0.1–2)
BUN BLD-MCNC: 16 MG/DL (ref 7–18)
BUN/CREAT SERPL: 16.2 (ref 10–20)
CALCIUM BLD-MCNC: 8.9 MG/DL (ref 8.5–10.1)
CHLORIDE SERPL-SCNC: 105 MMOL/L (ref 98–112)
CHOLEST SMN-MCNC: 108 MG/DL (ref ?–200)
CO2 SERPL-SCNC: 30 MMOL/L (ref 21–32)
COMPLEXED PSA SERPL-MCNC: 0.82 NG/ML (ref ?–4)
CREAT BLD-MCNC: 0.99 MG/DL (ref 0.7–1.3)
CREAT UR-SCNC: 138 MG/DL
DEPRECATED RDW RBC AUTO: 43.3 FL (ref 35.1–46.3)
EOSINOPHIL # BLD AUTO: 0.06 X10(3) UL (ref 0–0.7)
EOSINOPHIL NFR BLD AUTO: 1.1 %
ERYTHROCYTE [DISTWIDTH] IN BLOOD BY AUTOMATED COUNT: 12.6 % (ref 11–15)
EST. AVERAGE GLUCOSE BLD GHB EST-MCNC: 146 MG/DL (ref 68–126)
GLOBULIN PLAS-MCNC: 3.6 G/DL (ref 2.8–4.4)
GLUCOSE BLD-MCNC: 161 MG/DL (ref 70–99)
HBA1C MFR BLD HPLC: 6.7 % (ref ?–5.7)
HCT VFR BLD AUTO: 42.5 % (ref 39–53)
HDLC SERPL-MCNC: 49 MG/DL (ref 40–59)
HGB BLD-MCNC: 14.1 G/DL (ref 13–17.5)
IMM GRANULOCYTES # BLD AUTO: 0.02 X10(3) UL (ref 0–1)
IMM GRANULOCYTES NFR BLD: 0.4 %
LDLC SERPL CALC-MCNC: 42 MG/DL (ref ?–100)
LYMPHOCYTES # BLD AUTO: 1.36 X10(3) UL (ref 1–4)
LYMPHOCYTES NFR BLD AUTO: 24.1 %
M PROTEIN MFR SERPL ELPH: 7.6 G/DL (ref 6.4–8.2)
MCH RBC QN AUTO: 31.1 PG (ref 26–34)
MCHC RBC AUTO-ENTMCNC: 33.2 G/DL (ref 31–37)
MCV RBC AUTO: 93.6 FL (ref 80–100)
MICROALBUMIN UR-MCNC: 5.32 MG/DL
MICROALBUMIN/CREAT 24H UR-RTO: 38.6 UG/MG (ref ?–30)
MONOCYTES # BLD AUTO: 0.36 X10(3) UL (ref 0.1–1)
MONOCYTES NFR BLD AUTO: 6.4 %
NEUTROPHILS # BLD AUTO: 3.83 X10 (3) UL (ref 1.5–7.7)
NEUTROPHILS # BLD AUTO: 3.83 X10(3) UL (ref 1.5–7.7)
NEUTROPHILS NFR BLD AUTO: 67.6 %
NONHDLC SERPL-MCNC: 59 MG/DL (ref ?–130)
OSMOLALITY SERPL CALC.SUM OF ELEC: 297 MOSM/KG (ref 275–295)
PATIENT FASTING Y/N/NP: YES
PATIENT FASTING Y/N/NP: YES
PLATELET # BLD AUTO: 168 10(3)UL (ref 150–450)
POTASSIUM SERPL-SCNC: 3.9 MMOL/L (ref 3.5–5.1)
RBC # BLD AUTO: 4.54 X10(6)UL (ref 3.8–5.8)
SODIUM SERPL-SCNC: 141 MMOL/L (ref 136–145)
T4 FREE SERPL-MCNC: 1 NG/DL (ref 0.8–1.7)
TRIGL SERPL-MCNC: 85 MG/DL (ref 30–149)
TSI SER-ACNC: 1.53 MIU/ML (ref 0.36–3.74)
VLDLC SERPL CALC-MCNC: 17 MG/DL (ref 0–30)
WBC # BLD AUTO: 5.7 X10(3) UL (ref 4–11)

## 2020-07-09 PROCEDURE — G0009 ADMIN PNEUMOCOCCAL VACCINE: HCPCS | Performed by: INTERNAL MEDICINE

## 2020-07-09 PROCEDURE — 99214 OFFICE O/P EST MOD 30 MIN: CPT | Performed by: INTERNAL MEDICINE

## 2020-07-09 PROCEDURE — 36415 COLL VENOUS BLD VENIPUNCTURE: CPT | Performed by: INTERNAL MEDICINE

## 2020-07-09 PROCEDURE — 90732 PPSV23 VACC 2 YRS+ SUBQ/IM: CPT | Performed by: INTERNAL MEDICINE

## 2020-07-09 NOTE — H&P
HPI:   Geremias Rogers. is a 71year old male who presents for a Medicare Initial Preventative Physical exam.    Patient Active Problem List:     Essential hypertension, benign     Diabetes mellitus without complication ([facility] [address]     Prostate cancer scree MOUTH EVERY EVENING 120 tablet 10   • atorvastatin (LIPITOR) 40 MG Oral Tab Take 1 tablet (40 mg total) by mouth nightly.  90 tablet 3   • SUMATRIPTAN SUCCINATE 100 MG Oral Tab TAKE 1 TABLET BY ORAL ROUTE ONSET OF MIGRAINE; MAY REPEAT AFTER 2 HOURS IF HEADA thyroid   • Hypertension Son    • Cancer Paternal Grandmother         colon      SOCIAL HISTORY:   Social History    Tobacco Use      Smoking status: Never Smoker      Smokeless tobacco: Never Used    Alcohol use:  Yes      Alcohol/week: 0.0 standard drin masses, HSM or tenderness  : two descended testicles, no masses, no hernia and no penile lesions  RECTAL: good rectal tone, prostate shows no masses  MUSCULOSKELETAL: back is not tender, FROM of the back  EXTREMITIES: no cyanosis, clubbing or edema.   Salma data found. Fecal Occult Blood Annually No results found for: FOB No flowsheet data found.     Glaucoma Screening      Ophthalmology Visit Annually See soon     Prostate Cancer Screening      PSA  Annually PSA due on 04/26/2021  Update [facility] entered on: 6/25/2018   Last Dilated Eye Exam 5/18/2018     No flowsheet data found. COPD      Spirometry Testing Annually No results found for this or any previous visit. No flowsheet data found.         SUGGESTED VACCINATIONS - Influenza, Pneumococcal,

## 2020-08-03 RX ORDER — METFORMIN HYDROCHLORIDE 500 MG/1
TABLET, EXTENDED RELEASE ORAL
Qty: 120 TABLET | Refills: 11 | Status: SHIPPED | OUTPATIENT
Start: 2020-08-03 | End: 2021-07-22

## 2020-08-07 RX ORDER — ATORVASTATIN CALCIUM 40 MG/1
40 TABLET, FILM COATED ORAL NIGHTLY
Qty: 90 TABLET | Refills: 3 | Status: SHIPPED | OUTPATIENT
Start: 2020-08-07 | End: 2021-07-15

## 2020-08-31 ENCOUNTER — OFFICE VISIT (OUTPATIENT)
Dept: PODIATRY CLINIC | Facility: CLINIC | Age: 65
End: 2020-08-31
Payer: MEDICARE

## 2020-08-31 DIAGNOSIS — E11.9 DIABETES MELLITUS WITHOUT COMPLICATION (HCC): Primary | ICD-10-CM

## 2020-08-31 PROCEDURE — G0463 HOSPITAL OUTPT CLINIC VISIT: HCPCS | Performed by: PODIATRIST

## 2020-08-31 PROCEDURE — 99203 OFFICE O/P NEW LOW 30 MIN: CPT | Performed by: PODIATRIST

## 2020-08-31 RX ORDER — MONTELUKAST SODIUM 10 MG/1
TABLET ORAL
Qty: 30 TABLET | Refills: 0 | Status: SHIPPED | OUTPATIENT
Start: 2020-08-31 | End: 2020-09-27

## 2020-08-31 NOTE — PROGRESS NOTES
HPI:    Patient ID: Megan Lockwood. is a 76year old male. This pleasant 67-year-old male presents to the office today for a diabetic foot evaluation. The last time I saw this patient was more than 4 years ago. He saw  on July 9, 2020. REPEAT AFTER 2 HOURS IF HEADACHE RETURNS. DO NOT EXCEED 200MG IN 24 HOURS 9 tablet 9   • TraMADol HCl 50 MG Oral Tab TAKE ONE TABLET BY MOUTH EVERY 6 HOURS AS NEEDED FOR PAIN 50 tablet 5   • aspirin 81 MG Oral Tab Take 81 mg by mouth daily.      • Multiple

## 2020-09-27 RX ORDER — MONTELUKAST SODIUM 10 MG/1
10 TABLET ORAL NIGHTLY
Qty: 30 TABLET | Refills: 11 | Status: SHIPPED | OUTPATIENT
Start: 2020-09-27 | End: 2021-09-20

## 2020-10-30 RX ORDER — SUMATRIPTAN 100 MG/1
100 TABLET, FILM COATED ORAL EVERY 2 HOUR PRN
Qty: 9 TABLET | Refills: 11 | Status: SHIPPED | OUTPATIENT
Start: 2020-10-30

## 2020-11-10 ENCOUNTER — TELEPHONE (OUTPATIENT)
Dept: INTERNAL MEDICINE CLINIC | Facility: CLINIC | Age: 65
End: 2020-11-10

## 2020-11-10 ENCOUNTER — PATIENT MESSAGE (OUTPATIENT)
Dept: INTERNAL MEDICINE CLINIC | Facility: CLINIC | Age: 65
End: 2020-11-10

## 2020-11-10 DIAGNOSIS — B34.9 VIRAL ILLNESS: Primary | ICD-10-CM

## 2020-11-10 RX ORDER — AZITHROMYCIN 250 MG/1
TABLET, FILM COATED ORAL
Qty: 6 TABLET | Refills: 0 | Status: SHIPPED | OUTPATIENT
Start: 2020-11-10 | End: 2020-11-11

## 2020-11-10 RX ORDER — FLUTICASONE PROPIONATE 50 MCG
2 SPRAY, SUSPENSION (ML) NASAL DAILY
Qty: 3 BOTTLE | Refills: 1 | Status: SHIPPED | OUTPATIENT
Start: 2020-11-10 | End: 2020-11-11

## 2020-11-10 NOTE — TELEPHONE ENCOUNTER
Has a sinus infection, would like antibiotics sent to his pharmacy. Would like order entered for Covid, is having fatigue, headaches, dizziness.     [address]

## 2020-11-11 ENCOUNTER — LAB ENCOUNTER (OUTPATIENT)
Dept: LAB | Age: 65
End: 2020-11-11
Attending: INTERNAL MEDICINE
Payer: MEDICARE

## 2020-11-11 DIAGNOSIS — Z20.828 EXPOSURE TO SARS-ASSOCIATED CORONAVIRUS: Primary | ICD-10-CM

## 2020-11-11 RX ORDER — FLUTICASONE PROPIONATE 50 MCG
2 SPRAY, SUSPENSION (ML) NASAL DAILY
Qty: 3 BOTTLE | Refills: 1 | Status: SHIPPED | OUTPATIENT
Start: 2020-11-11 | End: 2021-11-18

## 2020-11-11 RX ORDER — AZITHROMYCIN 250 MG/1
TABLET, FILM COATED ORAL
Qty: 6 TABLET | Refills: 0 | Status: SHIPPED | OUTPATIENT
Start: 2020-11-11 | End: 2021-11-18

## 2020-11-11 NOTE — TELEPHONE ENCOUNTER
From: Santana Ibarra. To: Dino Muniz MD  Sent: 11/10/2020 5:17 PM CST  Subject: Prescription Question    Doctor can you tell me what location you sent my Zpac to?  It should go to the [address] [place] [address] in [facility] but they never received it   [employer]

## 2020-11-13 ENCOUNTER — TELEPHONE (OUTPATIENT)
Dept: INTERNAL MEDICINE CLINIC | Facility: CLINIC | Age: 65
End: 2020-11-13

## 2020-11-14 ENCOUNTER — APPOINTMENT (OUTPATIENT)
Dept: GENERAL RADIOLOGY | Facility: HOSPITAL | Age: 65
End: 2020-11-14
Attending: EMERGENCY MEDICINE
Payer: MEDICARE

## 2020-11-14 ENCOUNTER — HOSPITAL ENCOUNTER (EMERGENCY)
Facility: HOSPITAL | Age: 65
Discharge: HOME | End: 2020-11-14
Attending: EMERGENCY MEDICINE
Payer: MEDICARE

## 2020-11-14 VITALS
HEART RATE: 56 BPM | HEIGHT: 74 IN | TEMPERATURE: 98 F | BODY MASS INDEX: 29.13 KG/M2 | DIASTOLIC BLOOD PRESSURE: 94 MMHG | SYSTOLIC BLOOD PRESSURE: 149 MMHG | RESPIRATION RATE: 16 BRPM | WEIGHT: 227 LBS | OXYGEN SATURATION: 99 %

## 2020-11-14 DIAGNOSIS — U07.1 COVID-19 VIRUS INFECTION: Primary | ICD-10-CM

## 2020-11-14 PROCEDURE — 99283 EMERGENCY DEPT VISIT LOW MDM: CPT

## 2020-11-14 PROCEDURE — 71045 X-RAY EXAM CHEST 1 VIEW: CPT | Performed by: EMERGENCY MEDICINE

## 2020-11-14 NOTE — ED PROVIDER NOTES
Patient Seen in: [facility] Emergency Department    History   Patient presents with:  Difficulty Breathing  Covid    Stated Complaint: Covid + \"not feeling well\"    HPI    Patient is here with onset of symptoms of Covid on Monday 6 days ago was Oral Tab,  Take 1 tablet (500 mg total) by mouth 2 (two) times daily. ATORVASTATIN 40 MG Oral Tab,  Take 1 tablet (40 mg total) by mouth nightly.    METFORMIN HCL  MG Oral Tablet 24 Hr,  TAKE 4 TABLETS BY MOUTH EVERY EVENING   METOPROLOL SUCCINATE E well nourished adult lying in bed in no distress. Vital signs noted. Eye:  No scleral icterus. Eyelids appear normal, no lesions. Cardiovascular:  Normal S1 and S2, no murmur, regular, with good peripheral perfusion.   No extremity edema  Respiratory:

## 2020-11-14 NOTE — ED INITIAL ASSESSMENT (HPI)
Pt states was tested + COVID Wednesday. States today breathing was more labored and difficult to breath. States just a little labored breathing at this time. No resp distress noted at this time.

## 2020-11-14 NOTE — ED NOTES
Compa Quinn tested positive for COVID-19 two days ago. Today woke up with mild dyspnea. Has home pulse oximeter and states it's been consistently 95%. In NAD at this time.

## 2020-12-12 RX ORDER — ONDANSETRON 4 MG/1
TABLET, FILM COATED ORAL
Qty: 10 TABLET | Refills: 0 | Status: SHIPPED | OUTPATIENT
Start: 2020-12-12 | End: 2021-04-16

## 2021-01-06 ENCOUNTER — PATIENT MESSAGE (OUTPATIENT)
Dept: INTERNAL MEDICINE CLINIC | Facility: CLINIC | Age: 66
End: 2021-01-06

## 2021-01-06 DIAGNOSIS — T78.40XA ALLERGY, INITIAL ENCOUNTER: Primary | ICD-10-CM

## 2021-01-11 ENCOUNTER — PATIENT MESSAGE (OUTPATIENT)
Dept: INTERNAL MEDICINE CLINIC | Facility: CLINIC | Age: 66
End: 2021-01-11

## 2021-01-11 DIAGNOSIS — J32.9 RECURRENT SINUSITIS: Primary | ICD-10-CM

## 2021-01-11 PROBLEM — T78.40XA ALLERGIES: Status: ACTIVE | Noted: 2021-01-11

## 2021-01-11 NOTE — TELEPHONE ENCOUNTER
From: Rema Mccoy. To: Magui Bates MD  Sent: 1/6/2021 8:28 PM CST  Subject: Non-Urgent Medical Question    Dr. Marjan Tello you are doing well. My Allergies have been absolutely horrendous. Can you recommend an Allergist to see?    Thank you  B

## 2021-01-12 PROBLEM — J32.9 RECURRENT SINUSITIS: Status: ACTIVE | Noted: 2021-01-12

## 2021-01-14 ENCOUNTER — OFFICE VISIT (OUTPATIENT)
Dept: OTOLARYNGOLOGY | Facility: CLINIC | Age: 66
End: 2021-01-14
Payer: MEDICARE

## 2021-01-14 ENCOUNTER — TELEPHONE (OUTPATIENT)
Dept: OTOLARYNGOLOGY | Facility: CLINIC | Age: 66
End: 2021-01-14

## 2021-01-14 VITALS
TEMPERATURE: 97 F | BODY MASS INDEX: 30.16 KG/M2 | HEIGHT: 74 IN | WEIGHT: 235 LBS | SYSTOLIC BLOOD PRESSURE: 135 MMHG | DIASTOLIC BLOOD PRESSURE: 70 MMHG

## 2021-01-14 DIAGNOSIS — J32.9 RECURRENT SINUSITIS: Primary | ICD-10-CM

## 2021-01-14 DIAGNOSIS — T78.40XA ALLERGY, INITIAL ENCOUNTER: ICD-10-CM

## 2021-01-14 PROCEDURE — 99203 OFFICE O/P NEW LOW 30 MIN: CPT | Performed by: OTOLARYNGOLOGY

## 2021-01-14 RX ORDER — AZELASTINE HCL 205.5 UG/1
1 SPRAY NASAL 2 TIMES DAILY
Qty: 1 EACH | Refills: 3 | Status: SHIPPED | OUTPATIENT
Start: 2021-01-14 | End: 2021-07-24

## 2021-01-14 NOTE — PROGRESS NOTES
Elodia China. is a 76year old male. Patient presents with:  Sinus Problem: recurrent sinus infections, had 1 course of antibiotic      HISTORY OF PRESENT ILLNESS  He presents with a history of allergies.   He has been allergy tested in the past and • Seasonal allergies    • Visual impairment     glasses       Past Surgical History:   Procedure Laterality Date   • CERVICAL EPIDURAL STEROID INJECTION N/A 12/6/2019    Performed by Shannon Irwin DO at [facility] MAIN OR   • COLONOSCOPY  2012    repeat in Normal Submental. Submandibular. Anterior cervical. Posterior cervical. Supraclavicular.         Nose/Mouth/Throat Normal External nose - Normal. Lips/teeth/gums - Normal. Tonsils - Normal. Oropharynx - Normal.   Nose/Mouth/Throat Normal Nares - Right: Norm to 6 hours as needed for Wheezing., Disp: 3 Inhaler, Rfl: 3  •  TraMADol HCl 50 MG Oral Tab, TAKE ONE TABLET BY MOUTH EVERY 6 HOURS AS NEEDED FOR PAIN, Disp: 50 tablet, Rfl: 5  •  aspirin 81 MG Oral Tab, Take 81 mg by mouth daily. , Disp: , Rfl:   •  Multip

## 2021-01-21 RX ORDER — GLIMEPIRIDE 4 MG/1
TABLET ORAL
Qty: 180 TABLET | Refills: 3 | Status: SHIPPED | OUTPATIENT
Start: 2021-01-21 | End: 2022-01-07

## 2021-02-01 DIAGNOSIS — Z23 NEED FOR VACCINATION: ICD-10-CM

## 2021-02-07 RX ORDER — LOSARTAN POTASSIUM AND HYDROCHLOROTHIAZIDE 25; 100 MG/1; MG/1
1 TABLET ORAL DAILY
Qty: 30 TABLET | Refills: 5 | Status: SHIPPED | OUTPATIENT
Start: 2021-02-07 | End: 2021-08-01

## 2021-03-04 ENCOUNTER — OFFICE VISIT (OUTPATIENT)
Dept: OTOLARYNGOLOGY | Facility: CLINIC | Age: 66
End: 2021-03-04
Payer: MEDICARE

## 2021-03-04 VITALS
BODY MASS INDEX: 29.9 KG/M2 | HEIGHT: 74 IN | DIASTOLIC BLOOD PRESSURE: 77 MMHG | SYSTOLIC BLOOD PRESSURE: 149 MMHG | WEIGHT: 233 LBS | TEMPERATURE: 98 F

## 2021-03-04 DIAGNOSIS — T78.40XA ALLERGY, INITIAL ENCOUNTER: Primary | ICD-10-CM

## 2021-03-04 PROCEDURE — 99213 OFFICE O/P EST LOW 20 MIN: CPT | Performed by: OTOLARYNGOLOGY

## 2021-03-04 RX ORDER — LORATADINE 10 MG/1
10 TABLET ORAL DAILY
Qty: 30 TABLET | Refills: 3 | Status: SHIPPED | OUTPATIENT
Start: 2021-03-04 | End: 2021-07-06

## 2021-03-04 NOTE — PROGRESS NOTES
Tariq Do. is a 76year old male. Patient presents with:  Sinus Problem: patient is here for follow up stated much better still has a little congestion       HISTORY OF PRESENT ILLNESS  He presents with a history of allergies.   He has been allerg copd   • Cancer Daughter         thyroid   • Hypertension Son    • Cancer Paternal Grandmother         colon       Past Medical History:   Diagnosis Date   • Allergic rhinitis    • Asthma    • Back problem    • Degenerative cervical disc    • Diabetes (HC through XII grossly intact.    Head/Face Normal Facial features - Normal. Eyebrows - Normal. Skull - Normal.        Nasopharynx Normal External nose - Normal. Lips/teeth/gums - Normal. Tonsils - Normal. Oropharynx - Normal.   Ears Normal Inspection - Right: Disp: 120 tablet, Rfl: 11  •  METOPROLOL SUCCINATE ER 50 MG Oral Tablet 24 Hr, TAKE ONE TABLET BY MOUTH ONE TIME DAILY, Disp: 30 tablet, Rfl: 11  •  Albuterol Sulfate HFA (PROAIR HFA) 108 (90 Base) MCG/ACT Inhalation Aero Soln, Inhale 2 puffs into the lung

## 2021-03-05 ENCOUNTER — OFFICE VISIT (OUTPATIENT)
Dept: INTERNAL MEDICINE CLINIC | Facility: CLINIC | Age: 66
End: 2021-03-05
Payer: MEDICARE

## 2021-03-05 ENCOUNTER — LAB ENCOUNTER (OUTPATIENT)
Dept: LAB | Age: 66
End: 2021-03-05
Attending: INTERNAL MEDICINE
Payer: MEDICARE

## 2021-03-05 VITALS
WEIGHT: 235 LBS | SYSTOLIC BLOOD PRESSURE: 132 MMHG | HEART RATE: 56 BPM | TEMPERATURE: 98 F | BODY MASS INDEX: 30.16 KG/M2 | HEIGHT: 74 IN | DIASTOLIC BLOOD PRESSURE: 80 MMHG

## 2021-03-05 DIAGNOSIS — E78.00 PURE HYPERCHOLESTEROLEMIA: ICD-10-CM

## 2021-03-05 DIAGNOSIS — E11.9 DIABETES MELLITUS WITHOUT COMPLICATION (HCC): ICD-10-CM

## 2021-03-05 DIAGNOSIS — I10 ESSENTIAL HYPERTENSION, BENIGN: Primary | ICD-10-CM

## 2021-03-05 DIAGNOSIS — I10 ESSENTIAL HYPERTENSION, BENIGN: ICD-10-CM

## 2021-03-05 LAB
ALBUMIN SERPL-MCNC: 4.4 G/DL (ref 3.4–5)
ALBUMIN/GLOB SERPL: 1.4 {RATIO} (ref 1–2)
ALP LIVER SERPL-CCNC: 62 U/L
ALT SERPL-CCNC: 33 U/L
ANION GAP SERPL CALC-SCNC: 5 MMOL/L (ref 0–18)
AST SERPL-CCNC: 18 U/L (ref 15–37)
BASOPHILS # BLD AUTO: 0.02 X10(3) UL (ref 0–0.2)
BASOPHILS NFR BLD AUTO: 0.3 %
BILIRUB SERPL-MCNC: 0.8 MG/DL (ref 0.1–2)
BUN BLD-MCNC: 14 MG/DL (ref 7–18)
BUN/CREAT SERPL: 16.1 (ref 10–20)
CALCIUM BLD-MCNC: 9.2 MG/DL (ref 8.5–10.1)
CHLORIDE SERPL-SCNC: 104 MMOL/L (ref 98–112)
CHOLEST SMN-MCNC: 111 MG/DL (ref ?–200)
CO2 SERPL-SCNC: 31 MMOL/L (ref 21–32)
CREAT BLD-MCNC: 0.87 MG/DL
DEPRECATED RDW RBC AUTO: 43.1 FL (ref 35.1–46.3)
EOSINOPHIL # BLD AUTO: 0.12 X10(3) UL (ref 0–0.7)
EOSINOPHIL NFR BLD AUTO: 2 %
ERYTHROCYTE [DISTWIDTH] IN BLOOD BY AUTOMATED COUNT: 12.6 % (ref 11–15)
EST. AVERAGE GLUCOSE BLD GHB EST-MCNC: 183 MG/DL (ref 68–126)
GLOBULIN PLAS-MCNC: 3.1 G/DL (ref 2.8–4.4)
GLUCOSE BLD-MCNC: 190 MG/DL (ref 70–99)
HBA1C MFR BLD HPLC: 8 % (ref ?–5.7)
HCT VFR BLD AUTO: 44 %
HDLC SERPL-MCNC: 53 MG/DL (ref 40–59)
HGB BLD-MCNC: 14.5 G/DL
IMM GRANULOCYTES # BLD AUTO: 0.01 X10(3) UL (ref 0–1)
IMM GRANULOCYTES NFR BLD: 0.2 %
LDLC SERPL CALC-MCNC: 44 MG/DL (ref ?–100)
LYMPHOCYTES # BLD AUTO: 1.55 X10(3) UL (ref 1–4)
LYMPHOCYTES NFR BLD AUTO: 25.9 %
M PROTEIN MFR SERPL ELPH: 7.5 G/DL (ref 6.4–8.2)
MCH RBC QN AUTO: 30.6 PG (ref 26–34)
MCHC RBC AUTO-ENTMCNC: 33 G/DL (ref 31–37)
MCV RBC AUTO: 92.8 FL
MONOCYTES # BLD AUTO: 0.42 X10(3) UL (ref 0.1–1)
MONOCYTES NFR BLD AUTO: 7 %
NEUTROPHILS # BLD AUTO: 3.87 X10 (3) UL (ref 1.5–7.7)
NEUTROPHILS # BLD AUTO: 3.87 X10(3) UL (ref 1.5–7.7)
NEUTROPHILS NFR BLD AUTO: 64.6 %
NONHDLC SERPL-MCNC: 58 MG/DL (ref ?–130)
OSMOLALITY SERPL CALC.SUM OF ELEC: 296 MOSM/KG (ref 275–295)
PATIENT FASTING Y/N/NP: NO
PATIENT FASTING Y/N/NP: NO
PLATELET # BLD AUTO: 206 10(3)UL (ref 150–450)
POTASSIUM SERPL-SCNC: 3.8 MMOL/L (ref 3.5–5.1)
RBC # BLD AUTO: 4.74 X10(6)UL
SODIUM SERPL-SCNC: 140 MMOL/L (ref 136–145)
TRIGL SERPL-MCNC: 72 MG/DL (ref 30–149)
VLDLC SERPL CALC-MCNC: 14 MG/DL (ref 0–30)
WBC # BLD AUTO: 6 X10(3) UL (ref 4–11)

## 2021-03-05 PROCEDURE — 83036 HEMOGLOBIN GLYCOSYLATED A1C: CPT | Performed by: INTERNAL MEDICINE

## 2021-03-05 PROCEDURE — 80061 LIPID PANEL: CPT

## 2021-03-05 PROCEDURE — 99214 OFFICE O/P EST MOD 30 MIN: CPT | Performed by: INTERNAL MEDICINE

## 2021-03-05 PROCEDURE — 36415 COLL VENOUS BLD VENIPUNCTURE: CPT

## 2021-03-05 PROCEDURE — 80053 COMPREHEN METABOLIC PANEL: CPT

## 2021-03-05 PROCEDURE — 85025 COMPLETE CBC W/AUTO DIFF WBC: CPT

## 2021-03-05 NOTE — ASSESSMENT & PLAN NOTE
Called patient today to let her know that we did not have any orders for an iron infusion and per Rossi Ambrose' note she was going to have her see a hematologist in [place].  I advised the patient that she should call [place] office and let her know that she needs a referral for that and also that she can be seen in [place] if she would rather.   bp is controlled, no changes.

## 2021-03-05 NOTE — PROGRESS NOTES
HPI:    Patient ID: Susu Uriarte. is a 76year old male. HPIHypertension   This is a chronic problem. The current episode started more than 1 year ago. The problem is unchanged. The problem is controlled.  Pertinent negatives include no anxiety, bl Oral Tab Take 1 tablet (50 mg total) by mouth daily. 30 tablet 11   • SUMAtriptan Succinate 100 MG Oral Tab Take 1 tablet (100 mg total) by mouth every 2 (two) hours as needed for Migraine.  9 tablet 11   • Montelukast Sodium 10 MG Oral Tab Take 1 tablet (1 30.17 kg/m²      Physical Exam   Constitutional: He is oriented to person, place, and time. He appears well-developed and well-nourished. No distress.    HENT:   Right Ear: External ear normal.   Left Ear: External ear normal.   Nose: Nose normal.   Mouth/T [E]      Comp Metabolic Panel (14) [E]      Hemoglobin A1C [E]      Lipid Panel [E]      Meds This Visit:  Requested Prescriptions      No prescriptions requested or ordered in this encounter       Imaging & Referrals:  None       [place]#[id number]

## 2021-03-05 NOTE — ASSESSMENT & PLAN NOTE
Lipids are controlled. Labs today . Allergies are better, patient had urinary symptoms from sudafed.

## 2021-03-09 ENCOUNTER — IMMUNIZATION (OUTPATIENT)
Dept: LAB | Age: 66
End: 2021-03-09
Attending: HOSPITALIST
Payer: MEDICARE

## 2021-03-09 DIAGNOSIS — Z23 NEED FOR VACCINATION: Primary | ICD-10-CM

## 2021-03-09 PROCEDURE — 0001A SARSCOV2 VAC 30MCG/0.3ML IM: CPT

## 2021-03-15 RX ORDER — TRAMADOL HYDROCHLORIDE 50 MG/1
TABLET ORAL
Qty: 50 TABLET | Refills: 3 | Status: SHIPPED | OUTPATIENT
Start: 2021-03-15 | End: 2021-11-22

## 2021-03-30 ENCOUNTER — IMMUNIZATION (OUTPATIENT)
Dept: LAB | Age: 66
End: 2021-03-30
Attending: HOSPITALIST
Payer: MEDICARE

## 2021-03-30 DIAGNOSIS — Z23 NEED FOR VACCINATION: Primary | ICD-10-CM

## 2021-03-30 PROCEDURE — 0002A SARSCOV2 VAC 30MCG/0.3ML IM: CPT

## 2021-04-16 RX ORDER — ONDANSETRON 4 MG/1
TABLET, FILM COATED ORAL
Qty: 10 TABLET | Refills: 0 | Status: SHIPPED | OUTPATIENT
Start: 2021-04-16 | End: 2021-08-05

## 2021-05-27 RX ORDER — METOPROLOL SUCCINATE 50 MG/1
50 TABLET, EXTENDED RELEASE ORAL DAILY
Qty: 30 TABLET | Refills: 11 | Status: SHIPPED | OUTPATIENT
Start: 2021-05-27

## 2021-06-07 ENCOUNTER — TELEPHONE (OUTPATIENT)
Dept: INTERNAL MEDICINE CLINIC | Facility: CLINIC | Age: 66
End: 2021-06-07

## 2021-06-07 DIAGNOSIS — I10 ESSENTIAL HYPERTENSION, BENIGN: Primary | ICD-10-CM

## 2021-06-07 DIAGNOSIS — E78.00 PURE HYPERCHOLESTEROLEMIA: ICD-10-CM

## 2021-06-07 DIAGNOSIS — E11.9 DIABETES MELLITUS WITHOUT COMPLICATION (HCC): ICD-10-CM

## 2021-06-07 DIAGNOSIS — B34.9 VIRAL ILLNESS: ICD-10-CM

## 2021-06-07 NOTE — TELEPHONE ENCOUNTER
Requesting bloodwork orders for his 3 mt follow up and also would like to have a antibody test ordered

## 2021-06-09 ENCOUNTER — LAB ENCOUNTER (OUTPATIENT)
Dept: LAB | Age: 66
End: 2021-06-09
Attending: INTERNAL MEDICINE
Payer: MEDICARE

## 2021-06-09 DIAGNOSIS — B34.9 VIRAL ILLNESS: ICD-10-CM

## 2021-06-09 DIAGNOSIS — I10 ESSENTIAL HYPERTENSION, BENIGN: ICD-10-CM

## 2021-06-09 DIAGNOSIS — E78.00 PURE HYPERCHOLESTEROLEMIA: ICD-10-CM

## 2021-06-09 PROCEDURE — 86769 SARS-COV-2 COVID-19 ANTIBODY: CPT

## 2021-06-09 PROCEDURE — 85025 COMPLETE CBC W/AUTO DIFF WBC: CPT

## 2021-06-09 PROCEDURE — 36415 COLL VENOUS BLD VENIPUNCTURE: CPT | Performed by: INTERNAL MEDICINE

## 2021-06-09 PROCEDURE — 80061 LIPID PANEL: CPT

## 2021-06-09 PROCEDURE — 83036 HEMOGLOBIN GLYCOSYLATED A1C: CPT | Performed by: INTERNAL MEDICINE

## 2021-06-09 PROCEDURE — 80053 COMPREHEN METABOLIC PANEL: CPT

## 2021-06-10 ENCOUNTER — OFFICE VISIT (OUTPATIENT)
Dept: OTOLARYNGOLOGY | Facility: CLINIC | Age: 66
End: 2021-06-10
Payer: MEDICARE

## 2021-06-10 VITALS
HEIGHT: 74 IN | TEMPERATURE: 97 F | SYSTOLIC BLOOD PRESSURE: 110 MMHG | WEIGHT: 230 LBS | BODY MASS INDEX: 29.52 KG/M2 | DIASTOLIC BLOOD PRESSURE: 80 MMHG

## 2021-06-10 DIAGNOSIS — T78.40XA ALLERGY, INITIAL ENCOUNTER: Primary | ICD-10-CM

## 2021-06-10 PROCEDURE — 99213 OFFICE O/P EST LOW 20 MIN: CPT | Performed by: OTOLARYNGOLOGY

## 2021-06-10 NOTE — PROGRESS NOTES
India Elaine. is a 76year old male. Patient presents with: Follow - Up: pt presents today due to allergies. pt is feeling well today, loradine is helping.       HISTORY OF PRESENT ILLNESS  He presents with a history of allergies.  He has been aller use: No      Family History   Problem Relation Age of Onset   • Hypertension Father    • Diabetes Father    • Cancer Father         colon   • Heart Disorder Father    • Other (Other) Mother         copd   • Cancer Daughter         thyroid   • Hypertension Normal, Left: Normal. Fundus - Right: Normal, Left: Normal.   Neurological Normal Memory - Normal. Cranial nerves - Cranial nerves II through XII grossly intact.    Head/Face Normal Facial features - Normal. Eyebrows - Normal. Skull - Normal.        Nasopha Disp: 30 tablet, Rfl: 11  •  Fluticasone Propionate 50 MCG/ACT Nasal Suspension, 2 sprays by Each Nare route daily. , Disp: 3 Bottle, Rfl: 1  •  SUMAtriptan Succinate 100 MG Oral Tab, Take 1 tablet (100 mg total) by mouth every 2 (two) hours as needed for M

## 2021-07-02 PROBLEM — S39.012A STRAIN OF LUMBAR REGION: Status: ACTIVE | Noted: 2021-07-02

## 2021-07-06 RX ORDER — LORATADINE 10 MG/1
TABLET ORAL
Qty: 90 TABLET | Refills: 3 | Status: SHIPPED | OUTPATIENT
Start: 2021-07-06 | End: 2021-07-21

## 2021-07-08 PROBLEM — M48.061 SPINAL STENOSIS OF LUMBAR REGION: Status: ACTIVE | Noted: 2021-07-08

## 2021-07-15 RX ORDER — ATORVASTATIN CALCIUM 40 MG/1
40 TABLET, FILM COATED ORAL NIGHTLY
Qty: 90 TABLET | Refills: 3 | Status: SHIPPED | OUTPATIENT
Start: 2021-07-15

## 2021-07-16 ENCOUNTER — HOSPITAL ENCOUNTER (OUTPATIENT)
Dept: MRI IMAGING | Age: 66
Discharge: HOME | End: 2021-07-16
Attending: INTERNAL MEDICINE
Payer: MEDICARE

## 2021-07-16 DIAGNOSIS — M48.061 SPINAL STENOSIS OF LUMBAR REGION, UNSPECIFIED WHETHER NEUROGENIC CLAUDICATION PRESENT: ICD-10-CM

## 2021-07-16 PROCEDURE — 72148 MRI LUMBAR SPINE W/O DYE: CPT | Performed by: INTERNAL MEDICINE

## 2021-07-21 ENCOUNTER — TELEPHONE (OUTPATIENT)
Dept: ALLERGY | Facility: CLINIC | Age: 66
End: 2021-07-21

## 2021-07-21 ENCOUNTER — NURSE ONLY (OUTPATIENT)
Dept: ALLERGY | Facility: CLINIC | Age: 66
End: 2021-07-21
Payer: MEDICARE

## 2021-07-21 ENCOUNTER — OFFICE VISIT (OUTPATIENT)
Dept: ALLERGY | Facility: CLINIC | Age: 66
End: 2021-07-21
Payer: MEDICARE

## 2021-07-21 ENCOUNTER — LAB ENCOUNTER (OUTPATIENT)
Dept: LAB | Age: 66
End: 2021-07-21
Attending: ALLERGY & IMMUNOLOGY
Payer: MEDICARE

## 2021-07-21 VITALS
SYSTOLIC BLOOD PRESSURE: 147 MMHG | DIASTOLIC BLOOD PRESSURE: 74 MMHG | OXYGEN SATURATION: 96 % | HEART RATE: 58 BPM | RESPIRATION RATE: 17 BRPM

## 2021-07-21 DIAGNOSIS — J30.89 ENVIRONMENTAL AND SEASONAL ALLERGIES: ICD-10-CM

## 2021-07-21 DIAGNOSIS — J32.9 RECURRENT SINUSITIS: ICD-10-CM

## 2021-07-21 DIAGNOSIS — J30.89 PERENNIAL ALLERGIC RHINITIS WITH SEASONAL VARIATION: Primary | ICD-10-CM

## 2021-07-21 DIAGNOSIS — J30.2 PERENNIAL ALLERGIC RHINITIS WITH SEASONAL VARIATION: Primary | ICD-10-CM

## 2021-07-21 LAB
IGA SERPL-MCNC: 120 MG/DL (ref 70–312)
IGM SERPL-MCNC: 49 MG/DL (ref 43–279)
IMMUNOGLOBULIN PNL SER-MCNC: 829 MG/DL (ref 791–1643)

## 2021-07-21 PROCEDURE — 95024 IQ TESTS W/ALLERGENIC XTRCS: CPT | Performed by: ALLERGY & IMMUNOLOGY

## 2021-07-21 PROCEDURE — 82784 ASSAY IGA/IGD/IGG/IGM EACH: CPT

## 2021-07-21 PROCEDURE — 36415 COLL VENOUS BLD VENIPUNCTURE: CPT

## 2021-07-21 PROCEDURE — 95004 PERQ TESTS W/ALRGNC XTRCS: CPT | Performed by: ALLERGY & IMMUNOLOGY

## 2021-07-21 PROCEDURE — 99204 OFFICE O/P NEW MOD 45 MIN: CPT | Performed by: ALLERGY & IMMUNOLOGY

## 2021-07-21 RX ORDER — LEVOCETIRIZINE DIHYDROCHLORIDE 5 MG/1
5 TABLET, FILM COATED ORAL NIGHTLY
Qty: 30 TABLET | Refills: 0 | Status: SHIPPED | OUTPATIENT
Start: 2021-07-21 | End: 2021-08-21

## 2021-07-21 NOTE — TELEPHONE ENCOUNTER
Spoke with the spouse of patient, Katalina Agarwal, who is on [employer]. Verified patient's name and . Informed wife of codes for allergy shots [id number] for serum and [id number] for the injections and to please contact their insurance for coverage.  Wife verbalizes understandi

## 2021-07-21 NOTE — PROGRESS NOTES
Khadijah Del Toro. is a 78year old male. HPI:   Patient presents with: Allergies: Pt reports he has had allergies forever, struggles year round, but hardest time in fall and winter. Frequent sinus infections in the last year 3-4.  Pt reports sinuses a November 14, 2020 was unremarkable  CBC and CMP from June 2021 reviewed    Albuterol prn , rarely per pt             HISTORY:  Past Medical History:   Diagnosis Date   • Allergic rhinitis    • Asthma    • Back problem    • Degenerative cervical disc    • D HCl 0.15 % Nasal Solution 1 spray by Nasal route 2 (two) times daily. 1 each 3   • Sertraline HCl 50 MG Oral Tab Take 1 tablet (50 mg total) by mouth daily.  30 tablet 11   • SUMAtriptan Succinate 100 MG Oral Tab Take 1 tablet (100 mg total) by mouth every Negative for ear drainage, hearing loss .  See hpi   Eyes:  Negative for eye discharge and vision loss  Gastrointestinal:  Negative for abdominal pain, diarrhea and vomiting  Genitourinary:  Negative for dysuria and hematuria  Hema/Lymph:  Negative for eas past  Prior ENT evaluation    Skin testing today to common indoor and outdoor environmental allergens was positive to ragweed mold cats and dogs    Patient with positive response to histamine control    Recs:  Handouts on allergies and avoidance measures p

## 2021-07-21 NOTE — TELEPHONE ENCOUNTER
Spouse, states that the patient was seen in the office today and would like to know if the insurance covers the allergy shots.

## 2021-07-21 NOTE — PATIENT INSTRUCTIONS
Recs:  Handouts on allergies and avoidance measures provided and reviewed including the potential treatment option of immunotherapy  Handouts on allergic versus nonallergic rhinitis provided and reviewed  Continue with Singulair and Astelin  Trial of Xyzal

## 2021-07-22 RX ORDER — METFORMIN HYDROCHLORIDE 500 MG/1
TABLET, EXTENDED RELEASE ORAL
Qty: 120 TABLET | Refills: 0 | Status: SHIPPED | OUTPATIENT
Start: 2021-07-22 | End: 2021-08-20

## 2021-07-24 RX ORDER — AZELASTINE HCL 205.5 UG/1
1 SPRAY NASAL 2 TIMES DAILY
Qty: 30 ML | Refills: 3 | Status: SHIPPED | OUTPATIENT
Start: 2021-07-24

## 2021-07-26 ENCOUNTER — NURSE ONLY (OUTPATIENT)
Dept: ALLERGY | Facility: CLINIC | Age: 66
End: 2021-07-26
Payer: MEDICARE

## 2021-07-26 DIAGNOSIS — J30.89 ENVIRONMENTAL AND SEASONAL ALLERGIES: ICD-10-CM

## 2021-07-26 PROCEDURE — 95165 ANTIGEN THERAPY SERVICES: CPT | Performed by: ALLERGY & IMMUNOLOGY

## 2021-07-26 PROCEDURE — 95117 IMMUNOTHERAPY INJECTIONS: CPT | Performed by: ALLERGY & IMMUNOLOGY

## 2021-08-01 RX ORDER — LOSARTAN POTASSIUM AND HYDROCHLOROTHIAZIDE 25; 100 MG/1; MG/1
1 TABLET ORAL DAILY
Qty: 30 TABLET | Refills: 11 | Status: SHIPPED | OUTPATIENT
Start: 2021-08-01 | End: 2022-08-15

## 2021-08-02 ENCOUNTER — NURSE ONLY (OUTPATIENT)
Dept: ALLERGY | Facility: CLINIC | Age: 66
End: 2021-08-02
Payer: MEDICARE

## 2021-08-02 DIAGNOSIS — J30.89 ENVIRONMENTAL AND SEASONAL ALLERGIES: ICD-10-CM

## 2021-08-02 PROCEDURE — 95117 IMMUNOTHERAPY INJECTIONS: CPT | Performed by: ALLERGY & IMMUNOLOGY

## 2021-08-05 RX ORDER — ONDANSETRON 4 MG/1
TABLET, FILM COATED ORAL
Qty: 10 TABLET | Refills: 0 | Status: SHIPPED | OUTPATIENT
Start: 2021-08-05 | End: 2021-10-18

## 2021-08-09 ENCOUNTER — NURSE ONLY (OUTPATIENT)
Dept: ALLERGY | Facility: CLINIC | Age: 66
End: 2021-08-09
Payer: MEDICARE

## 2021-08-09 DIAGNOSIS — J30.89 ENVIRONMENTAL AND SEASONAL ALLERGIES: ICD-10-CM

## 2021-08-09 PROCEDURE — 95117 IMMUNOTHERAPY INJECTIONS: CPT | Performed by: ALLERGY & IMMUNOLOGY

## 2021-08-16 ENCOUNTER — TELEPHONE (OUTPATIENT)
Dept: INTERNAL MEDICINE CLINIC | Facility: CLINIC | Age: 66
End: 2021-08-16

## 2021-08-16 ENCOUNTER — NURSE ONLY (OUTPATIENT)
Dept: ALLERGY | Facility: CLINIC | Age: 66
End: 2021-08-16
Payer: MEDICARE

## 2021-08-16 DIAGNOSIS — J30.89 ENVIRONMENTAL AND SEASONAL ALLERGIES: ICD-10-CM

## 2021-08-16 PROCEDURE — 95117 IMMUNOTHERAPY INJECTIONS: CPT | Performed by: ALLERGY & IMMUNOLOGY

## 2021-08-20 RX ORDER — METFORMIN HYDROCHLORIDE 500 MG/1
2000 TABLET, EXTENDED RELEASE ORAL EVERY EVENING
Qty: 120 TABLET | Refills: 11 | Status: SHIPPED | OUTPATIENT
Start: 2021-08-20

## 2021-08-21 RX ORDER — LEVOCETIRIZINE DIHYDROCHLORIDE 5 MG/1
5 TABLET, FILM COATED ORAL NIGHTLY
Qty: 30 TABLET | Refills: 0 | Status: SHIPPED | OUTPATIENT
Start: 2021-08-21 | End: 2021-08-26

## 2021-08-21 NOTE — TELEPHONE ENCOUNTER
Refill requested for   Name from pharmacy: [facility]          Will file in chart as: LEVOCETIRIZINE DIHYDROCHLORIDE 5 MG Oral Tab    Sig: Take 1 tablet (5 mg total) by mouth nightly.     Disp:  30 tablet    Refills:  0

## 2021-08-26 ENCOUNTER — OFFICE VISIT (OUTPATIENT)
Dept: ALLERGY | Facility: CLINIC | Age: 66
End: 2021-08-26
Payer: MEDICARE

## 2021-08-26 ENCOUNTER — NURSE ONLY (OUTPATIENT)
Dept: ALLERGY | Facility: CLINIC | Age: 66
End: 2021-08-26
Payer: MEDICARE

## 2021-08-26 VITALS
DIASTOLIC BLOOD PRESSURE: 78 MMHG | OXYGEN SATURATION: 99 % | SYSTOLIC BLOOD PRESSURE: 169 MMHG | HEART RATE: 57 BPM | RESPIRATION RATE: 18 BRPM

## 2021-08-26 DIAGNOSIS — J30.89 ENVIRONMENTAL AND SEASONAL ALLERGIES: ICD-10-CM

## 2021-08-26 DIAGNOSIS — Z92.29 COVID-19 VACCINE SERIES COMPLETED: ICD-10-CM

## 2021-08-26 DIAGNOSIS — J30.2 PERENNIAL ALLERGIC RHINITIS WITH SEASONAL VARIATION: ICD-10-CM

## 2021-08-26 DIAGNOSIS — J30.89 PERENNIAL ALLERGIC RHINITIS WITH SEASONAL VARIATION: ICD-10-CM

## 2021-08-26 DIAGNOSIS — J32.9 RECURRENT SINUSITIS: Primary | ICD-10-CM

## 2021-08-26 DIAGNOSIS — J45.20 MILD INTERMITTENT EXTRINSIC ASTHMA WITHOUT COMPLICATION: ICD-10-CM

## 2021-08-26 DIAGNOSIS — R09.81 NASAL CONGESTION: ICD-10-CM

## 2021-08-26 PROCEDURE — 99214 OFFICE O/P EST MOD 30 MIN: CPT | Performed by: ALLERGY & IMMUNOLOGY

## 2021-08-26 PROCEDURE — 95117 IMMUNOTHERAPY INJECTIONS: CPT | Performed by: ALLERGY & IMMUNOLOGY

## 2021-08-26 RX ORDER — PREDNISONE 20 MG/1
TABLET ORAL
Qty: 10 TABLET | Refills: 0 | Status: SHIPPED | OUTPATIENT
Start: 2021-08-26

## 2021-08-26 RX ORDER — LEVOCETIRIZINE DIHYDROCHLORIDE 5 MG/1
5 TABLET, FILM COATED ORAL NIGHTLY
Qty: 90 TABLET | Refills: 1 | Status: SHIPPED | OUTPATIENT
Start: 2021-08-26

## 2021-08-26 NOTE — PROGRESS NOTES
Called  services to speak to pt she speaks fluent Patricia, lives in [place] wanted all of her appts in one day so she is scheduled for 11/19/19      Called and left message to call for results   Megan Lockwood. is a 78year old male. HPI:   Patient presents with: Allergies: Routine follow up and doing AIT. Reports allergies are still the same since starting allergy shots.        Patient is a 70-year-old male who presents for follow-up with Never used    Alcohol use: Not Currently      Alcohol/week: 0.0 standard drinks      Comment: socially    Drug use: No       Medications (Active prior to today's visit):  Current Outpatient Medications   Medication Sig Dispense Refill   • predniSONE 20 MG Vitamins-Minerals (MULTIVITAMIN ADULT OR) Take by mouth.      • Glucose Blood (SEPIDEH CONTOUR TEST) In Vitro Strip Test twice a day 100 each 11   • SEPIDEH MICROLET LANCETS Does not apply Misc Test twice a day 100 each 11   • alprazolam (XANAX) 0.5 MG Oral Tab seasonal variation  Nasal congestion  Mild intermittent extrinsic asthma without complication  [id number] vaccine series completed    Still prominent nasal and sinus congestion in spite of current medication regimen including Astelin and Xyzal Flonase Vineetul and sample was provided to the patient by myself. Teaching included  a review of potential adverse side effects as well as potential efficacy.   Patient's questions were answered in regards to medication administration and dosing and potential side effects

## 2021-08-26 NOTE — PATIENT INSTRUCTIONS
1. Ar/cs  Recs:  Continue with current medication regimen including Astelin Xyzal Flonase Singulair with Claritin-D as long as blood pressure is tolerating  Start prednisone 40 mg once a day with food x5 days given severity of symptoms.   Patient does have

## 2021-08-30 ENCOUNTER — NURSE ONLY (OUTPATIENT)
Dept: ALLERGY | Facility: CLINIC | Age: 66
End: 2021-08-30
Payer: MEDICARE

## 2021-08-30 DIAGNOSIS — J30.89 ENVIRONMENTAL AND SEASONAL ALLERGIES: ICD-10-CM

## 2021-08-30 PROCEDURE — 95117 IMMUNOTHERAPY INJECTIONS: CPT | Performed by: ALLERGY & IMMUNOLOGY

## 2021-08-30 PROCEDURE — 95165 ANTIGEN THERAPY SERVICES: CPT | Performed by: ALLERGY & IMMUNOLOGY

## 2021-08-30 RX ORDER — LORATADINE 10 MG/1
TABLET ORAL
Qty: 90 TABLET | Refills: 2 | Status: SHIPPED | OUTPATIENT
Start: 2021-08-30

## 2021-09-07 ENCOUNTER — NURSE ONLY (OUTPATIENT)
Dept: ALLERGY | Facility: CLINIC | Age: 66
End: 2021-09-07
Payer: MEDICARE

## 2021-09-07 DIAGNOSIS — J30.89 ENVIRONMENTAL AND SEASONAL ALLERGIES: ICD-10-CM

## 2021-09-07 PROCEDURE — 95117 IMMUNOTHERAPY INJECTIONS: CPT | Performed by: ALLERGY & IMMUNOLOGY

## 2021-09-13 ENCOUNTER — NURSE ONLY (OUTPATIENT)
Dept: ALLERGY | Facility: CLINIC | Age: 66
End: 2021-09-13
Payer: MEDICARE

## 2021-09-13 DIAGNOSIS — J30.89 ENVIRONMENTAL AND SEASONAL ALLERGIES: ICD-10-CM

## 2021-09-13 PROCEDURE — 95117 IMMUNOTHERAPY INJECTIONS: CPT | Performed by: ALLERGY & IMMUNOLOGY

## 2021-09-20 ENCOUNTER — NURSE ONLY (OUTPATIENT)
Dept: ALLERGY | Facility: CLINIC | Age: 66
End: 2021-09-20
Payer: MEDICARE

## 2021-09-20 DIAGNOSIS — J30.89 ENVIRONMENTAL AND SEASONAL ALLERGIES: ICD-10-CM

## 2021-09-20 PROCEDURE — 95117 IMMUNOTHERAPY INJECTIONS: CPT | Performed by: ALLERGY & IMMUNOLOGY

## 2021-09-20 RX ORDER — MONTELUKAST SODIUM 10 MG/1
10 TABLET ORAL NIGHTLY
Qty: 30 TABLET | Refills: 11 | Status: SHIPPED | OUTPATIENT
Start: 2021-09-20

## 2021-09-23 ENCOUNTER — TELEPHONE (OUTPATIENT)
Dept: INTERNAL MEDICINE CLINIC | Facility: CLINIC | Age: 66
End: 2021-09-23

## 2021-09-23 DIAGNOSIS — Z12.5 PROSTATE CANCER SCREENING: ICD-10-CM

## 2021-09-23 DIAGNOSIS — E11.9 DIABETES MELLITUS WITHOUT COMPLICATION (HCC): ICD-10-CM

## 2021-09-23 DIAGNOSIS — E78.00 PURE HYPERCHOLESTEROLEMIA: ICD-10-CM

## 2021-09-23 DIAGNOSIS — I10 ESSENTIAL HYPERTENSION, BENIGN: Primary | ICD-10-CM

## 2021-09-23 NOTE — TELEPHONE ENCOUNTER
Requesting an order to have his Bloodwork done,especially the A1C, going to the [address] [place] [address].

## 2021-09-24 ENCOUNTER — LAB ENCOUNTER (OUTPATIENT)
Dept: LAB | Age: 66
End: 2021-09-24
Attending: INTERNAL MEDICINE
Payer: MEDICARE

## 2021-09-24 DIAGNOSIS — Z12.5 PROSTATE CANCER SCREENING: ICD-10-CM

## 2021-09-24 DIAGNOSIS — E78.00 PURE HYPERCHOLESTEROLEMIA: ICD-10-CM

## 2021-09-24 DIAGNOSIS — I10 ESSENTIAL HYPERTENSION, BENIGN: ICD-10-CM

## 2021-09-24 LAB
ALBUMIN SERPL-MCNC: 4 G/DL (ref 3.4–5)
ALBUMIN/GLOB SERPL: 1.2 {RATIO} (ref 1–2)
ALP LIVER SERPL-CCNC: 51 U/L
ALT SERPL-CCNC: 32 U/L
ANION GAP SERPL CALC-SCNC: 6 MMOL/L (ref 0–18)
AST SERPL-CCNC: 27 U/L (ref 15–37)
BASOPHILS # BLD AUTO: 0.04 X10(3) UL (ref 0–0.2)
BASOPHILS NFR BLD AUTO: 0.7 %
BILIRUB SERPL-MCNC: 0.7 MG/DL (ref 0.1–2)
BUN BLD-MCNC: 22 MG/DL (ref 7–18)
BUN/CREAT SERPL: 22.9 (ref 10–20)
CALCIUM BLD-MCNC: 8.7 MG/DL (ref 8.5–10.1)
CHLORIDE SERPL-SCNC: 107 MMOL/L (ref 98–112)
CHOLEST SERPL-MCNC: 116 MG/DL (ref ?–200)
CO2 SERPL-SCNC: 28 MMOL/L (ref 21–32)
COMPLEXED PSA SERPL-MCNC: 1.57 NG/ML (ref ?–4)
CREAT BLD-MCNC: 0.96 MG/DL
DEPRECATED RDW RBC AUTO: 39.9 FL (ref 35.1–46.3)
EOSINOPHIL # BLD AUTO: 0.13 X10(3) UL (ref 0–0.7)
EOSINOPHIL NFR BLD AUTO: 2.3 %
ERYTHROCYTE [DISTWIDTH] IN BLOOD BY AUTOMATED COUNT: 11.9 % (ref 11–15)
EST. AVERAGE GLUCOSE BLD GHB EST-MCNC: 160 MG/DL (ref 68–126)
GLOBULIN PLAS-MCNC: 3.3 G/DL (ref 2.8–4.4)
GLUCOSE BLD-MCNC: 180 MG/DL (ref 70–99)
HBA1C MFR BLD HPLC: 7.2 % (ref ?–5.7)
HCT VFR BLD AUTO: 42.8 %
HDLC SERPL-MCNC: 51 MG/DL (ref 40–59)
HGB BLD-MCNC: 14.4 G/DL
IMM GRANULOCYTES # BLD AUTO: 0.01 X10(3) UL (ref 0–1)
IMM GRANULOCYTES NFR BLD: 0.2 %
LDLC SERPL CALC-MCNC: 52 MG/DL (ref ?–100)
LYMPHOCYTES # BLD AUTO: 1.38 X10(3) UL (ref 1–4)
LYMPHOCYTES NFR BLD AUTO: 24.8 %
MCH RBC QN AUTO: 31 PG (ref 26–34)
MCHC RBC AUTO-ENTMCNC: 33.6 G/DL (ref 31–37)
MCV RBC AUTO: 92 FL
MONOCYTES # BLD AUTO: 0.38 X10(3) UL (ref 0.1–1)
MONOCYTES NFR BLD AUTO: 6.8 %
NEUTROPHILS # BLD AUTO: 3.62 X10 (3) UL (ref 1.5–7.7)
NEUTROPHILS # BLD AUTO: 3.62 X10(3) UL (ref 1.5–7.7)
NEUTROPHILS NFR BLD AUTO: 65.2 %
NONHDLC SERPL-MCNC: 65 MG/DL (ref ?–130)
OSMOLALITY SERPL CALC.SUM OF ELEC: 300 MOSM/KG (ref 275–295)
PATIENT FASTING Y/N/NP: YES
PATIENT FASTING Y/N/NP: YES
PLATELET # BLD AUTO: 174 10(3)UL (ref 150–450)
POTASSIUM SERPL-SCNC: 4.3 MMOL/L (ref 3.5–5.1)
PROT SERPL-MCNC: 7.3 G/DL (ref 6.4–8.2)
RBC # BLD AUTO: 4.65 X10(6)UL
SODIUM SERPL-SCNC: 141 MMOL/L (ref 136–145)
T4 FREE SERPL-MCNC: 0.8 NG/DL (ref 0.8–1.7)
TRIGL SERPL-MCNC: 55 MG/DL (ref 30–149)
TSI SER-ACNC: 1.55 MIU/ML (ref 0.36–3.74)
VLDLC SERPL CALC-MCNC: 8 MG/DL (ref 0–30)
WBC # BLD AUTO: 5.6 X10(3) UL (ref 4–11)

## 2021-09-24 PROCEDURE — 84439 ASSAY OF FREE THYROXINE: CPT

## 2021-09-24 PROCEDURE — 84443 ASSAY THYROID STIM HORMONE: CPT

## 2021-09-24 PROCEDURE — 80053 COMPREHEN METABOLIC PANEL: CPT

## 2021-09-24 PROCEDURE — 80061 LIPID PANEL: CPT

## 2021-09-24 PROCEDURE — 85025 COMPLETE CBC W/AUTO DIFF WBC: CPT

## 2021-09-24 PROCEDURE — 36415 COLL VENOUS BLD VENIPUNCTURE: CPT

## 2021-09-24 PROCEDURE — 83036 HEMOGLOBIN GLYCOSYLATED A1C: CPT | Performed by: INTERNAL MEDICINE

## 2021-09-27 ENCOUNTER — NURSE ONLY (OUTPATIENT)
Dept: ALLERGY | Facility: CLINIC | Age: 66
End: 2021-09-27
Payer: MEDICARE

## 2021-09-27 DIAGNOSIS — J30.89 ENVIRONMENTAL AND SEASONAL ALLERGIES: ICD-10-CM

## 2021-09-27 PROCEDURE — 95117 IMMUNOTHERAPY INJECTIONS: CPT | Performed by: ALLERGY & IMMUNOLOGY

## 2021-09-29 ENCOUNTER — PATIENT MESSAGE (OUTPATIENT)
Dept: INTERNAL MEDICINE CLINIC | Facility: CLINIC | Age: 66
End: 2021-09-29

## 2021-09-29 DIAGNOSIS — E11.9 TYPE 2 DIABETES MELLITUS WITHOUT COMPLICATION, WITHOUT LONG-TERM CURRENT USE OF INSULIN (HCC): Primary | ICD-10-CM

## 2021-09-30 NOTE — TELEPHONE ENCOUNTER
Geeta Bull MD 9/30/2021 1:59 PM CDT    Please enter a referral for diabetic teaching for Chen Justin.   ----- Message -----  From: Van Zhu  Sent: 9/30/2021 10:00 AM CDT  To: Amy Orr MD  Subject: FW: Diabetes       ----- Message -----  From: Nakul Krishnan

## 2021-10-04 ENCOUNTER — NURSE ONLY (OUTPATIENT)
Dept: ALLERGY | Facility: CLINIC | Age: 66
End: 2021-10-04
Payer: MEDICARE

## 2021-10-04 DIAGNOSIS — J30.89 ENVIRONMENTAL AND SEASONAL ALLERGIES: ICD-10-CM

## 2021-10-04 PROCEDURE — 95117 IMMUNOTHERAPY INJECTIONS: CPT | Performed by: ALLERGY & IMMUNOLOGY

## 2021-10-07 ENCOUNTER — TELEPHONE (OUTPATIENT)
Dept: INTERNAL MEDICINE CLINIC | Facility: CLINIC | Age: 66
End: 2021-10-07

## 2021-10-07 NOTE — TELEPHONE ENCOUNTER
Warden Dewey called regarding Jean Carlos Brownign was given his pneumovax last July, Oneida Ramirez told him he needs his prevnar next, but she states the pharmacist told her different ? ?

## 2021-10-13 ENCOUNTER — HOSPITAL ENCOUNTER (OUTPATIENT)
Dept: ENDOCRINOLOGY | Facility: HOSPITAL | Age: 66
Discharge: HOME | End: 2021-10-13
Attending: INTERNAL MEDICINE
Payer: MEDICARE

## 2021-10-13 ENCOUNTER — NURSE ONLY (OUTPATIENT)
Dept: ALLERGY | Facility: CLINIC | Age: 66
End: 2021-10-13
Payer: MEDICARE

## 2021-10-13 VITALS — WEIGHT: 231.69 LBS | BODY MASS INDEX: 30 KG/M2

## 2021-10-13 DIAGNOSIS — E11.9 DIABETES MELLITUS WITHOUT COMPLICATION (HCC): Primary | ICD-10-CM

## 2021-10-13 DIAGNOSIS — J30.89 ENVIRONMENTAL AND SEASONAL ALLERGIES: ICD-10-CM

## 2021-10-13 DIAGNOSIS — E11.9 TYPE 2 DIABETES MELLITUS WITHOUT COMPLICATION, WITHOUT LONG-TERM CURRENT USE OF INSULIN (HCC): Primary | ICD-10-CM

## 2021-10-13 PROCEDURE — 95117 IMMUNOTHERAPY INJECTIONS: CPT | Performed by: ALLERGY & IMMUNOLOGY

## 2021-10-13 RX ORDER — BLOOD-GLUCOSE METER
1 EACH MISCELLANEOUS DAILY
Qty: 1 KIT | Refills: 0 | Status: SHIPPED | OUTPATIENT
Start: 2021-10-13 | End: 2022-10-10

## 2021-10-13 RX ORDER — BLOOD SUGAR DIAGNOSTIC
STRIP MISCELLANEOUS
Qty: 100 STRIP | Refills: 5 | Status: SHIPPED | OUTPATIENT
Start: 2021-10-13 | End: 2022-10-13

## 2021-10-13 RX ORDER — LANCETS 30 GAUGE
1 EACH MISCELLANEOUS DAILY
Qty: 100 EACH | Refills: 2 | Status: SHIPPED | OUTPATIENT
Start: 2021-10-13 | End: 2022-02-10

## 2021-10-13 NOTE — PROGRESS NOTES
Abi Lubin.   : 7/10/1955 attended individual initial assessment for Diabetes Education:    Date: 10/13/2021   Start time: 12N End time: 1P    HEMOGLOBIN A1c (% of total Hgb)   Date Value   2015 6.7 (H)     HgbA1C (%)   Date Value    -- attended classes 15 years ago, prefers 1:1, will request new referral    Prescriptions sent to preferred pharmacy for blood glucose meter, test strips and lancets per protocol. Written materials provided for all areas covered.     Patient verbalized und

## 2021-10-14 NOTE — ADDENDUM NOTE
Encounter addended by: Rosanne Rodriguez RD on: 10/13/2021 8:36 PM   Actions taken: Clinical Note Signed

## 2021-10-18 ENCOUNTER — NURSE ONLY (OUTPATIENT)
Dept: ALLERGY | Facility: CLINIC | Age: 66
End: 2021-10-18
Payer: MEDICARE

## 2021-10-18 DIAGNOSIS — J30.89 ENVIRONMENTAL AND SEASONAL ALLERGIES: ICD-10-CM

## 2021-10-18 PROCEDURE — 95117 IMMUNOTHERAPY INJECTIONS: CPT | Performed by: ALLERGY & IMMUNOLOGY

## 2021-10-18 RX ORDER — ONDANSETRON 4 MG/1
TABLET, FILM COATED ORAL
Qty: 10 TABLET | Refills: 0 | Status: SHIPPED | OUTPATIENT
Start: 2021-10-18 | End: 2021-11-22

## 2021-10-25 ENCOUNTER — NURSE ONLY (OUTPATIENT)
Dept: ALLERGY | Facility: CLINIC | Age: 66
End: 2021-10-25
Payer: MEDICARE

## 2021-10-25 DIAGNOSIS — J30.89 ENVIRONMENTAL AND SEASONAL ALLERGIES: ICD-10-CM

## 2021-10-25 PROCEDURE — 95117 IMMUNOTHERAPY INJECTIONS: CPT | Performed by: ALLERGY & IMMUNOLOGY

## 2021-10-28 ENCOUNTER — PATIENT MESSAGE (OUTPATIENT)
Dept: INTERNAL MEDICINE CLINIC | Facility: CLINIC | Age: 66
End: 2021-10-28

## 2021-10-28 DIAGNOSIS — E11.9 DIABETES MELLITUS WITHOUT COMPLICATION (HCC): Primary | ICD-10-CM

## 2021-10-28 RX ORDER — LANCETS
EACH MISCELLANEOUS
Qty: 100 EACH | Refills: 11 | Status: SHIPPED | OUTPATIENT
Start: 2021-10-28 | End: 2021-11-05

## 2021-10-28 RX ORDER — BLOOD SUGAR DIAGNOSTIC
STRIP MISCELLANEOUS
Qty: 100 STRIP | Refills: 11 | Status: SHIPPED | OUTPATIENT
Start: 2021-10-28 | End: 2021-11-05

## 2021-10-28 NOTE — TELEPHONE ENCOUNTER
Routed to Dr DOC Rankin Bedford for advise, thanks.       Requested Prescriptions     Pending Prescriptions Disp Refills   • Glucose Blood (ONETOUCH ULTRA) In Vitro Strip 100 strip 11     Sig: Test blood sugar once a day   • OneTouch UltraSoft Lancets Does not karlie 118

## 2021-10-28 NOTE — TELEPHONE ENCOUNTER
From: Mariaelena Woods. To: Humphrey Roe MD  Sent: 10/28/2021 7:45 AM CDT  Subject: Diabetes Test Strips    Good Morning Doctor Jermaine Cm am so sorry to hear about the passing of your Mom.  As I always tell people, “ Times like these are diffic

## 2021-11-01 ENCOUNTER — NURSE ONLY (OUTPATIENT)
Dept: ALLERGY | Facility: CLINIC | Age: 66
End: 2021-11-01
Payer: MEDICARE

## 2021-11-01 DIAGNOSIS — J30.89 ENVIRONMENTAL AND SEASONAL ALLERGIES: ICD-10-CM

## 2021-11-01 PROCEDURE — 95117 IMMUNOTHERAPY INJECTIONS: CPT | Performed by: ALLERGY & IMMUNOLOGY

## 2021-11-03 ENCOUNTER — APPOINTMENT (OUTPATIENT)
Dept: ENDOCRINOLOGY | Facility: HOSPITAL | Age: 66
End: 2021-11-03
Attending: INTERNAL MEDICINE
Payer: MEDICARE

## 2021-11-04 ENCOUNTER — PATIENT MESSAGE (OUTPATIENT)
Dept: INTERNAL MEDICINE CLINIC | Facility: CLINIC | Age: 66
End: 2021-11-04

## 2021-11-05 RX ORDER — LANCETS
EACH MISCELLANEOUS
Qty: 100 EACH | Refills: 11 | Status: SHIPPED | OUTPATIENT
Start: 2021-11-05

## 2021-11-05 RX ORDER — BLOOD SUGAR DIAGNOSTIC
STRIP MISCELLANEOUS
Qty: 100 STRIP | Refills: 11 | Status: SHIPPED | OUTPATIENT
Start: 2021-11-05

## 2021-11-05 NOTE — TELEPHONE ENCOUNTER
From: Oscar Credit. Sent: 11/4/2021 7:39 PM CDT  To: Em Rn Triage  Subject: Diabetes Test Strips    I still have not heard anything on my strips and lancets. I have a jace I received from Medicare awhile ago. It is a [employer] brand.  I am wondering if

## 2021-11-07 ENCOUNTER — PATIENT MESSAGE (OUTPATIENT)
Dept: ALLERGY | Facility: CLINIC | Age: 66
End: 2021-11-07

## 2021-11-08 ENCOUNTER — HOSPITAL ENCOUNTER (OUTPATIENT)
Age: 66
Discharge: HOME | End: 2021-11-08
Payer: MEDICARE

## 2021-11-08 VITALS
OXYGEN SATURATION: 97 % | HEART RATE: 58 BPM | BODY MASS INDEX: 28.88 KG/M2 | TEMPERATURE: 98 F | RESPIRATION RATE: 18 BRPM | HEIGHT: 74 IN | SYSTOLIC BLOOD PRESSURE: 152 MMHG | DIASTOLIC BLOOD PRESSURE: 80 MMHG | WEIGHT: 225 LBS

## 2021-11-08 DIAGNOSIS — J01.10 ACUTE FRONTAL SINUSITIS, RECURRENCE NOT SPECIFIED: Primary | ICD-10-CM

## 2021-11-08 PROCEDURE — 99213 OFFICE O/P EST LOW 20 MIN: CPT | Performed by: NURSE PRACTITIONER

## 2021-11-08 RX ORDER — AMOXICILLIN AND CLAVULANATE POTASSIUM 875; 125 MG/1; MG/1
1 TABLET, FILM COATED ORAL 2 TIMES DAILY
Qty: 20 TABLET | Refills: 0 | Status: SHIPPED | OUTPATIENT
Start: 2021-11-08 | End: 2021-11-18

## 2021-11-08 NOTE — ED INITIAL ASSESSMENT (HPI)
Runny nose, headache sore throat for one week- had covid and also the vaccine feels his sinus infection is here-gets them freq.

## 2021-11-08 NOTE — ED PROVIDER NOTES
Patient presents with:  Alverto Briones      HPI:     Tariq Do. is a 78year old male who presents for evaluation and management of a chief complaint of nasal and sinus congestion, sinus pressure, post nasal drip, and a dry cough for the past 7-8 days Transportation (Medical): Not on file      Lack of Transportation (Non-Medical):  Not on file  Physical Activity:       Days of Exercise per Week: Not on file      Minutes of Exercise per Session: Not on file  Stress:       Feeling of Stress : Not on file MDM/Assessment/Plan:   Orders for this encounter:  Orders Placed This Encounter      amoxicillin clavulanate 875-125 MG Oral Tab          Sig: Take 1 tablet by mouth 2 (two) times daily for 10 days.           Dispense:  20 tablet          Refill:  0

## 2021-11-08 NOTE — TELEPHONE ENCOUNTER
Sent a message and left a voicemail for patient to contact our office regarding his message.           ----- Message from Ashish Renteria. sent at 11/7/2021  8:26 AM CST -----  Regarding: Sinuses  Dr. Gladys Gaucher,  I am currently under going the allergy progr

## 2021-11-09 NOTE — TELEPHONE ENCOUNTER
Patient stated he did go to urgent care and he started on an antibiotic treatment. Patient would like to know if he should still come to his allergy appointment tomorrow 11/10.

## 2021-11-09 NOTE — TELEPHONE ENCOUNTER
Patient reports he started antibiotics last night around 5 pm. Took the second dose this morning. Feeling a little better. Still has congestion. Patient will skip injections tomorrow, and resume next week Monday.  That will be his day 14, and we will be a

## 2021-11-15 ENCOUNTER — NURSE TRIAGE (OUTPATIENT)
Dept: INTERNAL MEDICINE CLINIC | Facility: CLINIC | Age: 66
End: 2021-11-15

## 2021-11-15 ENCOUNTER — NURSE ONLY (OUTPATIENT)
Dept: ALLERGY | Facility: CLINIC | Age: 66
End: 2021-11-15
Payer: MEDICARE

## 2021-11-15 DIAGNOSIS — Z20.822 ENCOUNTER FOR LABORATORY TESTING FOR COVID-19 VIRUS: Primary | ICD-10-CM

## 2021-11-15 DIAGNOSIS — J30.89 ENVIRONMENTAL AND SEASONAL ALLERGIES: ICD-10-CM

## 2021-11-15 PROCEDURE — 95165 ANTIGEN THERAPY SERVICES: CPT | Performed by: ALLERGY & IMMUNOLOGY

## 2021-11-15 PROCEDURE — 95117 IMMUNOTHERAPY INJECTIONS: CPT | Performed by: ALLERGY & IMMUNOLOGY

## 2021-11-15 NOTE — TELEPHONE ENCOUNTER
Action Requested: Summary for Provider     []  Critical Lab, Recommendations Needed  [] Need Additional Advice  []   FYI    []   Need Orders  [] Need Medications Sent to Pharmacy  []  Other     SUMMARY: Patient requesting antibiotic for concerns of infecti

## 2021-11-15 NOTE — TELEPHONE ENCOUNTER
Pt was called and informed of Dr. Isis Rodriguez message below and he verbalized understanding. Pt was transferred to central scheduling.

## 2021-11-15 NOTE — TELEPHONE ENCOUNTER
----- Message from Viri Gordillo. sent at 11/15/2021  1:50 PM CST -----  Regarding: Sinus Infection  I have been fighting a severe sinus infection for almost 2 weeks.  I went to the [facility] care last Monday and they gave me Prolonged rupture of membranes Prolonged rupture of membranes RDS (respiratory distress syndrome of ) Respiratory insufficiency syndrome of  Respiratory insufficiency syndrome of  Respiratory insufficiency syndrome of  Need for observation and evaluation of  for sepsis RDS (respiratory distress syndrome of )

## 2021-11-16 ENCOUNTER — LAB ENCOUNTER (OUTPATIENT)
Dept: LAB | Age: 66
End: 2021-11-16
Attending: INTERNAL MEDICINE
Payer: MEDICARE

## 2021-11-16 DIAGNOSIS — Z20.822 ENCOUNTER FOR LABORATORY TESTING FOR COVID-19 VIRUS: ICD-10-CM

## 2021-11-17 ENCOUNTER — PATIENT MESSAGE (OUTPATIENT)
Dept: INTERNAL MEDICINE CLINIC | Facility: CLINIC | Age: 66
End: 2021-11-17

## 2021-11-18 RX ORDER — FLUTICASONE PROPIONATE 50 MCG
2 SPRAY, SUSPENSION (ML) NASAL DAILY
Qty: 16 G | Refills: 3 | Status: SHIPPED | OUTPATIENT
Start: 2021-11-18 | End: 2022-11-13

## 2021-11-18 RX ORDER — AZITHROMYCIN 250 MG/1
TABLET, FILM COATED ORAL
Qty: 6 TABLET | Refills: 0 | Status: SHIPPED | OUTPATIENT
Start: 2021-11-18 | End: 2021-11-23

## 2021-11-18 NOTE — TELEPHONE ENCOUNTER
From: Oscar Credit. To: Gordo Ryan MD  Sent: 11/17/2021 7:01 PM CST  Subject: Shelley Baltazar    Doctor  Per your request my Covid test came back negative. Could you please prescribe something for my sinus infection?   Thank you  Mera Rizvi

## 2021-11-22 ENCOUNTER — NURSE ONLY (OUTPATIENT)
Dept: ALLERGY | Facility: CLINIC | Age: 66
End: 2021-11-22
Payer: MEDICARE

## 2021-11-22 DIAGNOSIS — J30.89 ENVIRONMENTAL AND SEASONAL ALLERGIES: ICD-10-CM

## 2021-11-22 PROCEDURE — 95117 IMMUNOTHERAPY INJECTIONS: CPT | Performed by: ALLERGY & IMMUNOLOGY

## 2021-11-22 RX ORDER — ONDANSETRON 4 MG/1
4 TABLET, FILM COATED ORAL EVERY 8 HOURS PRN
Qty: 10 TABLET | Refills: 2 | Status: SHIPPED | OUTPATIENT
Start: 2021-11-22 | End: 2023-01-03

## 2021-11-22 RX ORDER — TRAMADOL HYDROCHLORIDE 50 MG/1
TABLET ORAL
Qty: 50 TABLET | Refills: 3 | Status: SHIPPED | OUTPATIENT
Start: 2021-11-22 | End: 2023-11-27

## 2021-11-22 NOTE — TELEPHONE ENCOUNTER
Please review. Protocol Failed or has No Protocol.     Requested Prescriptions   Pending Prescriptions Disp Refills    TRAMADOL 50 MG Oral Tab [Pharmacy Med Name: Tramadol Hcl 50 Mg Tab Sunp] 50 tablet 0     Sig: TAKE ONE TABLET BY MOUTH EVERY SIX HOURS AS NEEDED FOR PAIN        There is no refill protocol information for this order           Recent Outpatient Visits              1 week ago Environmental and seasonal allergies    [facility] Only    3 weeks ago Environmental and seasonal allergies    [facility], [facility] Only    4 weeks ago Environmental and seasonal allergies    [facility] Only    1 month ago Environmental and seasonal allergies    [facility] Only    1 month ago [facility]            Future Appointments         Provider Department Appt Notes    In 1 week [address]     In 2 weeks [address]     In 3 weeks [address]     In 4 weeks [address], [place], [place]     In 1 month [address] [place]

## 2021-11-29 ENCOUNTER — NURSE ONLY (OUTPATIENT)
Dept: ALLERGY | Facility: CLINIC | Age: 66
End: 2021-11-29
Payer: MEDICARE

## 2021-11-29 DIAGNOSIS — J30.89 ENVIRONMENTAL AND SEASONAL ALLERGIES: ICD-10-CM

## 2021-11-29 PROCEDURE — 95117 IMMUNOTHERAPY INJECTIONS: CPT | Performed by: ALLERGY & IMMUNOLOGY

## 2021-12-06 ENCOUNTER — NURSE ONLY (OUTPATIENT)
Dept: ALLERGY | Facility: CLINIC | Age: 66
End: 2021-12-06
Payer: MEDICARE

## 2021-12-06 DIAGNOSIS — J30.89 ENVIRONMENTAL AND SEASONAL ALLERGIES: ICD-10-CM

## 2021-12-06 PROCEDURE — 95117 IMMUNOTHERAPY INJECTIONS: CPT | Performed by: ALLERGY & IMMUNOLOGY

## 2021-12-13 ENCOUNTER — NURSE ONLY (OUTPATIENT)
Dept: ALLERGY | Facility: CLINIC | Age: 66
End: 2021-12-13
Payer: MEDICARE

## 2021-12-13 DIAGNOSIS — J30.89 ENVIRONMENTAL AND SEASONAL ALLERGIES: ICD-10-CM

## 2021-12-13 PROCEDURE — 95117 IMMUNOTHERAPY INJECTIONS: CPT | Performed by: ALLERGY & IMMUNOLOGY

## 2021-12-20 ENCOUNTER — NURSE ONLY (OUTPATIENT)
Dept: ALLERGY | Facility: CLINIC | Age: 66
End: 2021-12-20
Payer: MEDICARE

## 2021-12-20 DIAGNOSIS — J30.89 ENVIRONMENTAL AND SEASONAL ALLERGIES: ICD-10-CM

## 2021-12-20 PROCEDURE — 95117 IMMUNOTHERAPY INJECTIONS: CPT | Performed by: ALLERGY & IMMUNOLOGY

## 2021-12-27 ENCOUNTER — NURSE ONLY (OUTPATIENT)
Dept: ALLERGY | Facility: CLINIC | Age: 66
End: 2021-12-27
Payer: MEDICARE

## 2021-12-27 DIAGNOSIS — J30.89 ENVIRONMENTAL AND SEASONAL ALLERGIES: ICD-10-CM

## 2021-12-27 PROCEDURE — 95117 IMMUNOTHERAPY INJECTIONS: CPT | Performed by: ALLERGY & IMMUNOLOGY

## 2022-01-03 ENCOUNTER — NURSE ONLY (OUTPATIENT)
Dept: ALLERGY | Facility: CLINIC | Age: 67
End: 2022-01-03
Payer: MEDICARE

## 2022-01-03 DIAGNOSIS — J30.89 ENVIRONMENTAL AND SEASONAL ALLERGIES: ICD-10-CM

## 2022-01-03 PROCEDURE — 95117 IMMUNOTHERAPY INJECTIONS: CPT | Performed by: ALLERGY & IMMUNOLOGY

## 2022-01-07 RX ORDER — GLIMEPIRIDE 4 MG/1
TABLET ORAL
Qty: 180 TABLET | Refills: 3 | Status: SHIPPED | OUTPATIENT
Start: 2022-01-07

## 2022-01-10 ENCOUNTER — NURSE ONLY (OUTPATIENT)
Dept: ALLERGY | Facility: CLINIC | Age: 67
End: 2022-01-10
Payer: MEDICARE

## 2022-01-10 DIAGNOSIS — J30.89 ENVIRONMENTAL AND SEASONAL ALLERGIES: ICD-10-CM

## 2022-01-10 PROCEDURE — 95117 IMMUNOTHERAPY INJECTIONS: CPT | Performed by: ALLERGY & IMMUNOLOGY

## 2022-01-17 ENCOUNTER — NURSE ONLY (OUTPATIENT)
Dept: ALLERGY | Facility: CLINIC | Age: 67
End: 2022-01-17
Payer: MEDICARE

## 2022-01-17 DIAGNOSIS — J30.89 ENVIRONMENTAL AND SEASONAL ALLERGIES: ICD-10-CM

## 2022-01-17 PROCEDURE — 95117 IMMUNOTHERAPY INJECTIONS: CPT | Performed by: ALLERGY & IMMUNOLOGY

## 2022-01-24 ENCOUNTER — NURSE ONLY (OUTPATIENT)
Dept: ALLERGY | Facility: CLINIC | Age: 67
End: 2022-01-24
Payer: MEDICARE

## 2022-01-24 DIAGNOSIS — J30.89 ENVIRONMENTAL AND SEASONAL ALLERGIES: ICD-10-CM

## 2022-01-24 PROCEDURE — 95165 ANTIGEN THERAPY SERVICES: CPT | Performed by: ALLERGY & IMMUNOLOGY

## 2022-01-24 PROCEDURE — 95117 IMMUNOTHERAPY INJECTIONS: CPT | Performed by: ALLERGY & IMMUNOLOGY

## 2022-01-26 ENCOUNTER — TELEPHONE (OUTPATIENT)
Dept: FAMILY MEDICINE CLINIC | Facility: CLINIC | Age: 67
End: 2022-01-26

## 2022-01-26 NOTE — TELEPHONE ENCOUNTER
Herzio message sent to patient. Patient Review of Clinical Information    Problems   The patient or proxy has not reviewed this information. Medications   The patient or proxy has not reviewed this information, and there are updates pending:   Requested Medication Removals Start Date Reported By  Comments   Fluticasone Propionate 50 MCG/ACT Susp 11/11/2020 Hema Qiu. Prescribed another spray   Glucose Blood (ONETOUCH VERIO) In Vitro Strip 10/13/2021 Danielle Layton. Loratadine-Pseudoephedrine ER 5-120 MG Tb12 1/14/2021 Danielle Layton. Replaced with a different drug   predniSONE 20 MG Tabs 8/26/2021 Danielle Layton. One time use     Allergies   The patient or proxy has not reviewed this information.

## 2022-02-03 ENCOUNTER — LAB ENCOUNTER (OUTPATIENT)
Dept: LAB | Age: 67
End: 2022-02-03
Attending: INTERNAL MEDICINE
Payer: MEDICARE

## 2022-02-03 DIAGNOSIS — E11.9 DIABETES MELLITUS WITHOUT COMPLICATION (HCC): ICD-10-CM

## 2022-02-03 LAB
ALBUMIN SERPL-MCNC: 4.2 G/DL (ref 3.4–5)
ALBUMIN/GLOB SERPL: 1.6 {RATIO} (ref 1–2)
ALP LIVER SERPL-CCNC: 50 U/L
ALT SERPL-CCNC: 27 U/L
ANION GAP SERPL CALC-SCNC: 5 MMOL/L (ref 0–18)
AST SERPL-CCNC: 20 U/L (ref 15–37)
BILIRUB SERPL-MCNC: 0.5 MG/DL (ref 0.1–2)
BUN BLD-MCNC: 25 MG/DL (ref 7–18)
BUN/CREAT SERPL: 26 (ref 10–20)
CALCIUM BLD-MCNC: 9.6 MG/DL (ref 8.5–10.1)
CHLORIDE SERPL-SCNC: 104 MMOL/L (ref 98–112)
CO2 SERPL-SCNC: 31 MMOL/L (ref 21–32)
CREAT BLD-MCNC: 0.96 MG/DL
FASTING STATUS PATIENT QL REPORTED: YES
GLOBULIN PLAS-MCNC: 2.6 G/DL (ref 2.8–4.4)
GLUCOSE BLD-MCNC: 181 MG/DL (ref 70–99)
OSMOLALITY SERPL CALC.SUM OF ELEC: 299 MOSM/KG (ref 275–295)
POTASSIUM SERPL-SCNC: 3.8 MMOL/L (ref 3.5–5.1)
PROT SERPL-MCNC: 6.8 G/DL (ref 6.4–8.2)
SODIUM SERPL-SCNC: 140 MMOL/L (ref 136–145)

## 2022-02-03 PROCEDURE — 83036 HEMOGLOBIN GLYCOSYLATED A1C: CPT | Performed by: INTERNAL MEDICINE

## 2022-02-03 PROCEDURE — 80053 COMPREHEN METABOLIC PANEL: CPT

## 2022-02-03 PROCEDURE — 36415 COLL VENOUS BLD VENIPUNCTURE: CPT | Performed by: INTERNAL MEDICINE

## 2022-02-07 ENCOUNTER — NURSE ONLY (OUTPATIENT)
Dept: ALLERGY | Facility: CLINIC | Age: 67
End: 2022-02-07
Payer: MEDICARE

## 2022-02-07 DIAGNOSIS — J30.89 ENVIRONMENTAL AND SEASONAL ALLERGIES: ICD-10-CM

## 2022-02-07 PROCEDURE — 95117 IMMUNOTHERAPY INJECTIONS: CPT | Performed by: ALLERGY & IMMUNOLOGY

## 2022-02-24 ENCOUNTER — OFFICE VISIT (OUTPATIENT)
Dept: ALLERGY | Facility: CLINIC | Age: 67
End: 2022-02-24
Payer: MEDICARE

## 2022-02-24 ENCOUNTER — NURSE ONLY (OUTPATIENT)
Dept: ALLERGY | Facility: CLINIC | Age: 67
End: 2022-02-24
Payer: MEDICARE

## 2022-02-24 VITALS
RESPIRATION RATE: 20 BRPM | HEIGHT: 73 IN | DIASTOLIC BLOOD PRESSURE: 80 MMHG | BODY MASS INDEX: 31.28 KG/M2 | WEIGHT: 236 LBS | OXYGEN SATURATION: 99 % | TEMPERATURE: 98 F | HEART RATE: 54 BPM | SYSTOLIC BLOOD PRESSURE: 177 MMHG

## 2022-02-24 DIAGNOSIS — J30.89 ENVIRONMENTAL AND SEASONAL ALLERGIES: ICD-10-CM

## 2022-02-24 DIAGNOSIS — J45.20 MILD INTERMITTENT EXTRINSIC ASTHMA WITHOUT COMPLICATION: ICD-10-CM

## 2022-02-24 DIAGNOSIS — Z23 FLU VACCINE NEED: ICD-10-CM

## 2022-02-24 DIAGNOSIS — Z92.29 COVID-19 VACCINE SERIES COMPLETED: ICD-10-CM

## 2022-02-24 DIAGNOSIS — J32.9 RECURRENT SINUSITIS: ICD-10-CM

## 2022-02-24 DIAGNOSIS — J30.2 PERENNIAL ALLERGIC RHINITIS WITH SEASONAL VARIATION: Primary | ICD-10-CM

## 2022-02-24 DIAGNOSIS — J30.89 PERENNIAL ALLERGIC RHINITIS WITH SEASONAL VARIATION: Primary | ICD-10-CM

## 2022-02-24 PROCEDURE — 99214 OFFICE O/P EST MOD 30 MIN: CPT | Performed by: ALLERGY & IMMUNOLOGY

## 2022-02-24 PROCEDURE — 95117 IMMUNOTHERAPY INJECTIONS: CPT | Performed by: ALLERGY & IMMUNOLOGY

## 2022-02-24 NOTE — PATIENT INSTRUCTIONS
#1 extrinsic asthma  No ED visits or prednisone in the interim. Denies symptoms more than 2 days/week with current Singulair. Continue with singular once a day. Albuterol 2 puffs every 4-6 hours if having active coughing wheezing shortness of breath  Reviewed signs and symptoms of persistent asthma including the rules of 2  Pneumovax 23 up-to-date  Flu vaccine up-to-date    #2 allergic rhinitis  Patiently recently reached maintenance dose immunotherapy in February 2022 to underlying environmental allergens including ragweed mold cats and dogs. 1 dog in the home  Continue with Singulair and Xyzal with Flonase as needed  Reviewed avoidance measures  Reviewed 3 to 5 years of maintenance dosing    #3 chronic sinusitis  Denies recurrent sinus infections or antibiotics more than twice a year  No current fevers or mucopurulence. Continue with treatment of underlying environmental allergies. Add sinus rinses as needed    #4 flu vaccine up-to-date    #5 COVID vaccine up-to-date x2 doses.   Recommend booster    #6 Pneumovax 23 up-to-date    Follow-up in 1 year or sooner if needed

## 2022-03-07 ENCOUNTER — NURSE ONLY (OUTPATIENT)
Dept: ALLERGY | Facility: CLINIC | Age: 67
End: 2022-03-07
Payer: MEDICARE

## 2022-03-07 DIAGNOSIS — J30.89 ENVIRONMENTAL AND SEASONAL ALLERGIES: ICD-10-CM

## 2022-03-07 PROCEDURE — 95117 IMMUNOTHERAPY INJECTIONS: CPT | Performed by: ALLERGY & IMMUNOLOGY

## 2022-03-07 RX ORDER — LEVOCETIRIZINE DIHYDROCHLORIDE 5 MG/1
5 TABLET, FILM COATED ORAL NIGHTLY
Qty: 90 TABLET | Refills: 2 | Status: SHIPPED | OUTPATIENT
Start: 2022-03-07

## 2022-03-29 ENCOUNTER — NURSE ONLY (OUTPATIENT)
Dept: ALLERGY | Facility: CLINIC | Age: 67
End: 2022-03-29
Payer: MEDICARE

## 2022-03-29 DIAGNOSIS — J30.89 ENVIRONMENTAL AND SEASONAL ALLERGIES: ICD-10-CM

## 2022-03-29 PROCEDURE — 95117 IMMUNOTHERAPY INJECTIONS: CPT | Performed by: ALLERGY & IMMUNOLOGY

## 2022-04-18 ENCOUNTER — NURSE ONLY (OUTPATIENT)
Dept: ALLERGY | Facility: CLINIC | Age: 67
End: 2022-04-18
Payer: MEDICARE

## 2022-04-18 DIAGNOSIS — J30.89 ENVIRONMENTAL AND SEASONAL ALLERGIES: ICD-10-CM

## 2022-04-18 PROCEDURE — 95117 IMMUNOTHERAPY INJECTIONS: CPT | Performed by: ALLERGY & IMMUNOLOGY

## 2022-05-02 RX ORDER — METOPROLOL SUCCINATE 50 MG/1
50 TABLET, EXTENDED RELEASE ORAL DAILY
Qty: 30 TABLET | Refills: 5 | Status: SHIPPED | OUTPATIENT
Start: 2022-05-02 | End: 2022-10-26

## 2022-05-16 ENCOUNTER — NURSE ONLY (OUTPATIENT)
Dept: ALLERGY | Facility: CLINIC | Age: 67
End: 2022-05-16
Payer: MEDICARE

## 2022-05-16 DIAGNOSIS — J30.89 ENVIRONMENTAL AND SEASONAL ALLERGIES: ICD-10-CM

## 2022-05-16 PROCEDURE — 95117 IMMUNOTHERAPY INJECTIONS: CPT | Performed by: ALLERGY & IMMUNOLOGY

## 2022-06-07 ENCOUNTER — OFFICE VISIT (OUTPATIENT)
Dept: INTERNAL MEDICINE CLINIC | Facility: CLINIC | Age: 67
End: 2022-06-07
Payer: MEDICARE

## 2022-06-07 ENCOUNTER — LAB ENCOUNTER (OUTPATIENT)
Dept: LAB | Age: 67
End: 2022-06-07
Attending: INTERNAL MEDICINE
Payer: MEDICARE

## 2022-06-07 VITALS
WEIGHT: 229 LBS | SYSTOLIC BLOOD PRESSURE: 138 MMHG | DIASTOLIC BLOOD PRESSURE: 84 MMHG | TEMPERATURE: 98 F | HEIGHT: 73 IN | BODY MASS INDEX: 30.35 KG/M2 | HEART RATE: 60 BPM

## 2022-06-07 DIAGNOSIS — E78.00 PURE HYPERCHOLESTEROLEMIA: ICD-10-CM

## 2022-06-07 DIAGNOSIS — Z00.00 MEDICARE ANNUAL WELLNESS VISIT, SUBSEQUENT: Primary | ICD-10-CM

## 2022-06-07 DIAGNOSIS — I10 ESSENTIAL HYPERTENSION, BENIGN: ICD-10-CM

## 2022-06-07 DIAGNOSIS — E11.9 DIABETES MELLITUS WITHOUT COMPLICATION (HCC): ICD-10-CM

## 2022-06-07 PROBLEM — J32.9 RECURRENT SINUSITIS: Status: RESOLVED | Noted: 2021-01-12 | Resolved: 2022-06-07

## 2022-06-07 PROBLEM — S39.012A STRAIN OF LUMBAR REGION: Status: RESOLVED | Noted: 2021-07-02 | Resolved: 2022-06-07

## 2022-06-07 PROBLEM — M48.061 SPINAL STENOSIS OF LUMBAR REGION: Status: RESOLVED | Noted: 2021-07-08 | Resolved: 2022-06-07

## 2022-06-07 PROBLEM — J30.89 ENVIRONMENTAL AND SEASONAL ALLERGIES: Status: RESOLVED | Noted: 2022-03-29 | Resolved: 2022-06-07

## 2022-06-07 PROBLEM — Z12.5 PROSTATE CANCER SCREENING: Status: RESOLVED | Noted: 2017-03-10 | Resolved: 2022-06-07

## 2022-06-07 LAB
ALBUMIN SERPL-MCNC: 4.3 G/DL (ref 3.4–5)
ALBUMIN/GLOB SERPL: 1.4 {RATIO} (ref 1–2)
ALP LIVER SERPL-CCNC: 49 U/L
ALT SERPL-CCNC: 35 U/L
ANION GAP SERPL CALC-SCNC: 6 MMOL/L (ref 0–18)
AST SERPL-CCNC: 28 U/L (ref 15–37)
BASOPHILS # BLD AUTO: 0.03 X10(3) UL (ref 0–0.2)
BASOPHILS NFR BLD AUTO: 0.6 %
BILIRUB SERPL-MCNC: 0.7 MG/DL (ref 0.1–2)
BUN BLD-MCNC: 21 MG/DL (ref 7–18)
BUN/CREAT SERPL: 21.2 (ref 10–20)
CALCIUM BLD-MCNC: 9.2 MG/DL (ref 8.5–10.1)
CHLORIDE SERPL-SCNC: 105 MMOL/L (ref 98–112)
CHOLEST SERPL-MCNC: 106 MG/DL (ref ?–200)
CO2 SERPL-SCNC: 31 MMOL/L (ref 21–32)
CREAT BLD-MCNC: 0.99 MG/DL
CREAT UR-SCNC: 172 MG/DL
DEPRECATED RDW RBC AUTO: 43.7 FL (ref 35.1–46.3)
EOSINOPHIL # BLD AUTO: 0.09 X10(3) UL (ref 0–0.7)
EOSINOPHIL NFR BLD AUTO: 1.8 %
ERYTHROCYTE [DISTWIDTH] IN BLOOD BY AUTOMATED COUNT: 12.3 % (ref 11–15)
EST. AVERAGE GLUCOSE BLD GHB EST-MCNC: 143 MG/DL (ref 68–126)
FASTING PATIENT LIPID ANSWER: YES
FASTING STATUS PATIENT QL REPORTED: YES
GLOBULIN PLAS-MCNC: 3 G/DL (ref 2.8–4.4)
GLUCOSE BLD-MCNC: 152 MG/DL (ref 70–99)
HBA1C MFR BLD: 6.6 % (ref ?–5.7)
HCT VFR BLD AUTO: 44.9 %
HDLC SERPL-MCNC: 52 MG/DL (ref 40–59)
HGB BLD-MCNC: 14.6 G/DL
IMM GRANULOCYTES # BLD AUTO: 0 X10(3) UL (ref 0–1)
IMM GRANULOCYTES NFR BLD: 0 %
LDLC SERPL CALC-MCNC: 41 MG/DL (ref ?–100)
LYMPHOCYTES # BLD AUTO: 1.33 X10(3) UL (ref 1–4)
LYMPHOCYTES NFR BLD AUTO: 26.8 %
MCH RBC QN AUTO: 31 PG (ref 26–34)
MCHC RBC AUTO-ENTMCNC: 32.5 G/DL (ref 31–37)
MCV RBC AUTO: 95.3 FL
MICROALBUMIN UR-MCNC: 6.96 MG/DL
MICROALBUMIN/CREAT 24H UR-RTO: 40.5 UG/MG (ref ?–30)
MONOCYTES # BLD AUTO: 0.42 X10(3) UL (ref 0.1–1)
MONOCYTES NFR BLD AUTO: 8.5 %
NEUTROPHILS # BLD AUTO: 3.1 X10 (3) UL (ref 1.5–7.7)
NEUTROPHILS # BLD AUTO: 3.1 X10(3) UL (ref 1.5–7.7)
NEUTROPHILS NFR BLD AUTO: 62.3 %
NONHDLC SERPL-MCNC: 54 MG/DL (ref ?–130)
OSMOLALITY SERPL CALC.SUM OF ELEC: 300 MOSM/KG (ref 275–295)
PLATELET # BLD AUTO: 167 10(3)UL (ref 150–450)
POTASSIUM SERPL-SCNC: 3.8 MMOL/L (ref 3.5–5.1)
PROT SERPL-MCNC: 7.3 G/DL (ref 6.4–8.2)
RBC # BLD AUTO: 4.71 X10(6)UL
SODIUM SERPL-SCNC: 142 MMOL/L (ref 136–145)
T4 FREE SERPL-MCNC: 0.9 NG/DL (ref 0.8–1.7)
TRIGL SERPL-MCNC: 58 MG/DL (ref 30–149)
TSI SER-ACNC: 1.24 MIU/ML (ref 0.36–3.74)
VLDLC SERPL CALC-MCNC: 8 MG/DL (ref 0–30)
WBC # BLD AUTO: 5 X10(3) UL (ref 4–11)

## 2022-06-07 PROCEDURE — 82043 UR ALBUMIN QUANTITATIVE: CPT

## 2022-06-07 PROCEDURE — 1125F AMNT PAIN NOTED PAIN PRSNT: CPT | Performed by: INTERNAL MEDICINE

## 2022-06-07 PROCEDURE — 36415 COLL VENOUS BLD VENIPUNCTURE: CPT

## 2022-06-07 PROCEDURE — 84443 ASSAY THYROID STIM HORMONE: CPT

## 2022-06-07 PROCEDURE — 90677 PCV20 VACCINE IM: CPT | Performed by: INTERNAL MEDICINE

## 2022-06-07 PROCEDURE — 80061 LIPID PANEL: CPT

## 2022-06-07 PROCEDURE — G0439 PPPS, SUBSEQ VISIT: HCPCS | Performed by: INTERNAL MEDICINE

## 2022-06-07 PROCEDURE — G0009 ADMIN PNEUMOCOCCAL VACCINE: HCPCS | Performed by: INTERNAL MEDICINE

## 2022-06-07 PROCEDURE — 82272 OCCULT BLD FECES 1-3 TESTS: CPT | Performed by: INTERNAL MEDICINE

## 2022-06-07 PROCEDURE — 83036 HEMOGLOBIN GLYCOSYLATED A1C: CPT | Performed by: INTERNAL MEDICINE

## 2022-06-07 PROCEDURE — 80053 COMPREHEN METABOLIC PANEL: CPT

## 2022-06-07 PROCEDURE — 85025 COMPLETE CBC W/AUTO DIFF WBC: CPT

## 2022-06-07 PROCEDURE — 84439 ASSAY OF FREE THYROXINE: CPT

## 2022-06-07 PROCEDURE — 82570 ASSAY OF URINE CREATININE: CPT

## 2022-06-07 NOTE — ASSESSMENT & PLAN NOTE
Physical today   Labs soon   c scope in 2024. Sees dr will weems. Had covid vaccine due for Prevnar.

## 2022-06-15 ENCOUNTER — NURSE ONLY (OUTPATIENT)
Dept: ALLERGY | Facility: CLINIC | Age: 67
End: 2022-06-15
Payer: MEDICARE

## 2022-06-15 DIAGNOSIS — J30.89 ENVIRONMENTAL AND SEASONAL ALLERGIES: ICD-10-CM

## 2022-06-15 PROCEDURE — 95117 IMMUNOTHERAPY INJECTIONS: CPT | Performed by: ALLERGY & IMMUNOLOGY

## 2022-06-22 ENCOUNTER — HOSPITAL ENCOUNTER (OUTPATIENT)
Age: 67
Discharge: HOME | End: 2022-06-22
Payer: MEDICARE

## 2022-06-22 VITALS
SYSTOLIC BLOOD PRESSURE: 146 MMHG | DIASTOLIC BLOOD PRESSURE: 70 MMHG | HEART RATE: 56 BPM | RESPIRATION RATE: 20 BRPM | OXYGEN SATURATION: 99 % | HEIGHT: 74 IN | BODY MASS INDEX: 29.13 KG/M2 | TEMPERATURE: 98 F | WEIGHT: 227 LBS

## 2022-06-22 DIAGNOSIS — Z20.822 ENCOUNTER FOR SCREENING LABORATORY TESTING FOR COVID-19 VIRUS: ICD-10-CM

## 2022-06-22 DIAGNOSIS — B34.9 VIRAL ILLNESS: Primary | ICD-10-CM

## 2022-06-22 DIAGNOSIS — Z20.822 LAB TEST NEGATIVE FOR COVID-19 VIRUS: ICD-10-CM

## 2022-06-22 LAB — SARS-COV-2 RNA RESP QL NAA+PROBE: NOT DETECTED

## 2022-06-22 PROCEDURE — 99213 OFFICE O/P EST LOW 20 MIN: CPT

## 2022-06-22 PROCEDURE — 99212 OFFICE O/P EST SF 10 MIN: CPT

## 2022-06-22 NOTE — ED INITIAL ASSESSMENT (HPI)
Patient reports headache, congestion and sore throat starting yesterday. + covid exposure 2 days ago.

## 2022-06-29 RX ORDER — ATORVASTATIN CALCIUM 40 MG/1
40 TABLET, FILM COATED ORAL NIGHTLY
Qty: 90 TABLET | Refills: 3 | Status: SHIPPED | OUTPATIENT
Start: 2022-06-29 | End: 2023-05-22

## 2022-07-04 ENCOUNTER — PATIENT MESSAGE (OUTPATIENT)
Dept: INTERNAL MEDICINE CLINIC | Facility: CLINIC | Age: 67
End: 2022-07-04

## 2022-07-05 ENCOUNTER — OFFICE VISIT (OUTPATIENT)
Dept: OTOLARYNGOLOGY | Facility: CLINIC | Age: 67
End: 2022-07-05
Payer: MEDICARE

## 2022-07-05 VITALS — BODY MASS INDEX: 29.13 KG/M2 | WEIGHT: 227 LBS | HEIGHT: 74 IN

## 2022-07-05 DIAGNOSIS — H61.23 BILATERAL IMPACTED CERUMEN: Primary | ICD-10-CM

## 2022-07-05 PROCEDURE — 69210 REMOVE IMPACTED EAR WAX UNI: CPT | Performed by: OTOLARYNGOLOGY

## 2022-07-12 ENCOUNTER — NURSE ONLY (OUTPATIENT)
Dept: ALLERGY | Facility: CLINIC | Age: 67
End: 2022-07-12
Payer: MEDICARE

## 2022-07-12 DIAGNOSIS — J30.89 ENVIRONMENTAL AND SEASONAL ALLERGIES: ICD-10-CM

## 2022-07-12 PROCEDURE — 95117 IMMUNOTHERAPY INJECTIONS: CPT | Performed by: ALLERGY & IMMUNOLOGY

## 2022-07-28 RX ORDER — METFORMIN HYDROCHLORIDE 500 MG/1
2000 TABLET, EXTENDED RELEASE ORAL EVERY EVENING
Qty: 120 TABLET | Refills: 0 | Status: SHIPPED | OUTPATIENT
Start: 2022-07-28

## 2022-08-09 ENCOUNTER — MED REC SCAN ONLY (OUTPATIENT)
Dept: INTERNAL MEDICINE CLINIC | Facility: CLINIC | Age: 67
End: 2022-08-09

## 2022-08-09 ENCOUNTER — NURSE ONLY (OUTPATIENT)
Dept: ALLERGY | Facility: CLINIC | Age: 67
End: 2022-08-09
Payer: MEDICARE

## 2022-08-09 DIAGNOSIS — J30.89 ENVIRONMENTAL AND SEASONAL ALLERGIES: ICD-10-CM

## 2022-08-09 PROCEDURE — 95117 IMMUNOTHERAPY INJECTIONS: CPT | Performed by: ALLERGY & IMMUNOLOGY

## 2022-08-14 ENCOUNTER — PATIENT MESSAGE (OUTPATIENT)
Dept: INTERNAL MEDICINE CLINIC | Facility: CLINIC | Age: 67
End: 2022-08-14

## 2022-08-15 RX ORDER — LOSARTAN POTASSIUM AND HYDROCHLOROTHIAZIDE 25; 100 MG/1; MG/1
1 TABLET ORAL DAILY
Qty: 90 TABLET | Refills: 1 | Status: SHIPPED | OUTPATIENT
Start: 2022-08-15

## 2022-08-15 NOTE — TELEPHONE ENCOUNTER
From: Mary Kovacs. To: Юлия Mehta MD  Sent: 8/14/2022 8:25 PM CDT  Subject: Medication Refill    Evening Dr. Bard Cline  The pharmacy I use has sent in for refills for my Losartin twice this past week. I called today and they stated they still have not received a reply as of yet. I am out and the RX has been giving a couple of pills to carry me over.  I was hoping you could check to see if in fact you have received this request?  Thank you,  Wale Avilez

## 2022-08-19 ENCOUNTER — HOSPITAL ENCOUNTER (EMERGENCY)
Facility: HOSPITAL | Age: 67
Discharge: HOME | End: 2022-08-20
Attending: EMERGENCY MEDICINE
Payer: MEDICARE

## 2022-08-19 ENCOUNTER — APPOINTMENT (OUTPATIENT)
Dept: GENERAL RADIOLOGY | Facility: HOSPITAL | Age: 67
End: 2022-08-19
Attending: EMERGENCY MEDICINE
Payer: MEDICARE

## 2022-08-19 VITALS
OXYGEN SATURATION: 95 % | BODY MASS INDEX: 30 KG/M2 | TEMPERATURE: 98 F | SYSTOLIC BLOOD PRESSURE: 141 MMHG | WEIGHT: 237 LBS | DIASTOLIC BLOOD PRESSURE: 74 MMHG | RESPIRATION RATE: 19 BRPM | HEART RATE: 60 BPM

## 2022-08-19 DIAGNOSIS — R07.89 CHEST PAIN, ATYPICAL: Primary | ICD-10-CM

## 2022-08-19 LAB
ANION GAP SERPL CALC-SCNC: 3 MMOL/L (ref 0–18)
BASOPHILS # BLD AUTO: 0.04 X10(3) UL (ref 0–0.2)
BASOPHILS NFR BLD AUTO: 0.6 %
BUN BLD-MCNC: 20 MG/DL (ref 7–18)
BUN/CREAT SERPL: 19.6 (ref 10–20)
CALCIUM BLD-MCNC: 9.3 MG/DL (ref 8.5–10.1)
CHLORIDE SERPL-SCNC: 106 MMOL/L (ref 98–112)
CO2 SERPL-SCNC: 29 MMOL/L (ref 21–32)
CREAT BLD-MCNC: 1.02 MG/DL
DEPRECATED RDW RBC AUTO: 41.6 FL (ref 35.1–46.3)
EOSINOPHIL # BLD AUTO: 0.18 X10(3) UL (ref 0–0.7)
EOSINOPHIL NFR BLD AUTO: 2.7 %
ERYTHROCYTE [DISTWIDTH] IN BLOOD BY AUTOMATED COUNT: 12.1 % (ref 11–15)
GFR SERPLBLD BASED ON 1.73 SQ M-ARVRAT: 80 ML/MIN/1.73M2 (ref 60–?)
GLUCOSE BLD-MCNC: 80 MG/DL (ref 70–99)
HCT VFR BLD AUTO: 43.8 %
HGB BLD-MCNC: 14.6 G/DL
IMM GRANULOCYTES # BLD AUTO: 0.01 X10(3) UL (ref 0–1)
IMM GRANULOCYTES NFR BLD: 0.2 %
LYMPHOCYTES # BLD AUTO: 2.13 X10(3) UL (ref 1–4)
LYMPHOCYTES NFR BLD AUTO: 32.2 %
MCH RBC QN AUTO: 30.7 PG (ref 26–34)
MCHC RBC AUTO-ENTMCNC: 33.3 G/DL (ref 31–37)
MCV RBC AUTO: 92 FL
MONOCYTES # BLD AUTO: 0.51 X10(3) UL (ref 0.1–1)
MONOCYTES NFR BLD AUTO: 7.7 %
NEUTROPHILS # BLD AUTO: 3.74 X10 (3) UL (ref 1.5–7.7)
NEUTROPHILS # BLD AUTO: 3.74 X10(3) UL (ref 1.5–7.7)
NEUTROPHILS NFR BLD AUTO: 56.6 %
OSMOLALITY SERPL CALC.SUM OF ELEC: 288 MOSM/KG (ref 275–295)
PLATELET # BLD AUTO: 190 10(3)UL (ref 150–450)
POTASSIUM SERPL-SCNC: 3.5 MMOL/L (ref 3.5–5.1)
RBC # BLD AUTO: 4.76 X10(6)UL
SODIUM SERPL-SCNC: 138 MMOL/L (ref 136–145)
TROPONIN I HIGH SENSITIVITY: 8 NG/L
WBC # BLD AUTO: 6.6 X10(3) UL (ref 4–11)

## 2022-08-19 PROCEDURE — 84484 ASSAY OF TROPONIN QUANT: CPT | Performed by: EMERGENCY MEDICINE

## 2022-08-19 PROCEDURE — 93010 ELECTROCARDIOGRAM REPORT: CPT | Performed by: EMERGENCY MEDICINE

## 2022-08-19 PROCEDURE — 85025 COMPLETE CBC W/AUTO DIFF WBC: CPT | Performed by: EMERGENCY MEDICINE

## 2022-08-19 PROCEDURE — 93005 ELECTROCARDIOGRAM TRACING: CPT

## 2022-08-19 PROCEDURE — 99284 EMERGENCY DEPT VISIT MOD MDM: CPT

## 2022-08-19 PROCEDURE — 36415 COLL VENOUS BLD VENIPUNCTURE: CPT

## 2022-08-19 PROCEDURE — 71045 X-RAY EXAM CHEST 1 VIEW: CPT | Performed by: EMERGENCY MEDICINE

## 2022-08-19 PROCEDURE — 80048 BASIC METABOLIC PNL TOTAL CA: CPT | Performed by: EMERGENCY MEDICINE

## 2022-08-24 ENCOUNTER — HOSPITAL ENCOUNTER (OUTPATIENT)
Dept: NUCLEAR MEDICINE | Facility: HOSPITAL | Age: 67
Discharge: HOME | End: 2022-08-24
Attending: EMERGENCY MEDICINE
Payer: MEDICARE

## 2022-08-24 ENCOUNTER — HOSPITAL ENCOUNTER (OUTPATIENT)
Dept: CV DIAGNOSTICS | Facility: HOSPITAL | Age: 67
Discharge: HOME | End: 2022-08-24
Attending: EMERGENCY MEDICINE
Payer: MEDICARE

## 2022-08-24 DIAGNOSIS — R07.89 CHEST PAIN, ATYPICAL: ICD-10-CM

## 2022-08-24 PROCEDURE — 93016 CV STRESS TEST SUPVJ ONLY: CPT | Performed by: EMERGENCY MEDICINE

## 2022-08-24 PROCEDURE — 93017 CV STRESS TEST TRACING ONLY: CPT | Performed by: EMERGENCY MEDICINE

## 2022-08-24 PROCEDURE — 78452 HT MUSCLE IMAGE SPECT MULT: CPT | Performed by: EMERGENCY MEDICINE

## 2022-08-24 PROCEDURE — 93018 CV STRESS TEST I&R ONLY: CPT | Performed by: EMERGENCY MEDICINE

## 2022-08-28 RX ORDER — MONTELUKAST SODIUM 10 MG/1
10 TABLET ORAL NIGHTLY
Qty: 90 TABLET | Refills: 1 | Status: SHIPPED | OUTPATIENT
Start: 2022-08-28 | End: 2023-02-23

## 2022-08-28 NOTE — TELEPHONE ENCOUNTER
Refill passed per [place] [facility] protocol. Requested Prescriptions   Pending Prescriptions Disp Refills    MONTELUKAST 10 MG Oral Tab [Pharmacy Med Name: Montelukast Sodium 10 Mg Tab Auro] 30 tablet 0     Sig: Take 1 tablet (10 mg total) by mouth nightly.         Asthma & COPD Medication Protocol Passed - 8/28/2022  1:31 AM        Passed - In person appointment or virtual visit in the past 6 mos or appointment in next 3 mos       Recent Outpatient Visits              2 weeks ago Environmental and seasonal allergies    [facility], [address] Only    1 month ago Environmental and seasonal allergies    [facility], [address]    Nurse Only    1 month ago Bilateral impacted cerumen    [facility] [place], [place], Cynthia Clifford MD    Office Visit    2 months ago Environmental and seasonal allergies    [facility], [address]    Nurse Only    2 months ago [employer] annual wellness visit, subsequent    [facility], [address], [place] Jose Villeda MD    Office Visit     Future Appointments         Provider Department Appt Notes    In 1 week [address], [place] allergy shots    In 3 weeks Jose Villeda MD [facility], [address], [address] concern    In 1 month [address], [place] allergy shots    In 2 months [address], [place]     In 3 months [address], [place]     In 4 months [address], [address] Visits              2 weeks ago Environmental and seasonal allergies    [place] [facility], [address] Only    1 month ago Environmental and seasonal allergies    [facility], [address] Only    1 month ago Bilateral impacted [address] [facility], [address], Anderson Clifford MD    Office Visit    2 months ago Environmental and seasonal allergies    [address], [address]    Nurse Only    2 months ago [employer] annual wellness visit, subsequent    [address], [address], [place] Princess Singletary MD    Office Visit           Future Appointments         Provider Department Appt Notes    In 1 week [address], [place] allergy shots    In 3 weeks Princess Singletary MD [address], [address], [address] concern    In 1 month [address], [place] allergy shots    In 2 months [address]     In 3 months [address]     In 4 months [address]

## 2022-09-06 ENCOUNTER — NURSE ONLY (OUTPATIENT)
Dept: ALLERGY | Facility: CLINIC | Age: 67
End: 2022-09-06
Payer: MEDICARE

## 2022-09-06 DIAGNOSIS — J30.89 ENVIRONMENTAL AND SEASONAL ALLERGIES: ICD-10-CM

## 2022-09-06 PROCEDURE — 95165 ANTIGEN THERAPY SERVICES: CPT | Performed by: ALLERGY & IMMUNOLOGY

## 2022-09-06 PROCEDURE — 95117 IMMUNOTHERAPY INJECTIONS: CPT | Performed by: ALLERGY & IMMUNOLOGY

## 2022-09-20 ENCOUNTER — OFFICE VISIT (OUTPATIENT)
Dept: INTERNAL MEDICINE CLINIC | Facility: CLINIC | Age: 67
End: 2022-09-20

## 2022-09-20 VITALS
WEIGHT: 229 LBS | DIASTOLIC BLOOD PRESSURE: 70 MMHG | HEART RATE: 60 BPM | BODY MASS INDEX: 29.39 KG/M2 | HEIGHT: 74 IN | SYSTOLIC BLOOD PRESSURE: 126 MMHG | TEMPERATURE: 98 F

## 2022-09-20 DIAGNOSIS — R07.89 ATYPICAL CHEST PAIN: ICD-10-CM

## 2022-09-20 DIAGNOSIS — I10 ESSENTIAL HYPERTENSION, BENIGN: Primary | ICD-10-CM

## 2022-09-20 PROCEDURE — 1126F AMNT PAIN NOTED NONE PRSNT: CPT | Performed by: INTERNAL MEDICINE

## 2022-09-20 PROCEDURE — 99214 OFFICE O/P EST MOD 30 MIN: CPT | Performed by: INTERNAL MEDICINE

## 2022-09-20 RX ORDER — PANTOPRAZOLE SODIUM 40 MG/1
40 TABLET, DELAYED RELEASE ORAL
Qty: 30 TABLET | Refills: 1 | Status: SHIPPED | OUTPATIENT
Start: 2022-09-20

## 2022-09-20 RX ORDER — TAMSULOSIN HYDROCHLORIDE 0.4 MG/1
CAPSULE ORAL
COMMUNITY
Start: 2022-09-04 | End: 2022-09-20 | Stop reason: ALTCHOICE

## 2022-10-04 ENCOUNTER — NURSE ONLY (OUTPATIENT)
Dept: ALLERGY | Facility: CLINIC | Age: 67
End: 2022-10-04
Payer: MEDICARE

## 2022-10-04 DIAGNOSIS — J30.89 ENVIRONMENTAL AND SEASONAL ALLERGIES: ICD-10-CM

## 2022-10-04 PROCEDURE — 95117 IMMUNOTHERAPY INJECTIONS: CPT | Performed by: ALLERGY & IMMUNOLOGY

## 2022-10-07 RX ORDER — METFORMIN HYDROCHLORIDE 500 MG/1
2000 TABLET, EXTENDED RELEASE ORAL EVERY EVENING
Qty: 120 TABLET | Refills: 1 | Status: SHIPPED | OUTPATIENT
Start: 2022-10-07

## 2022-10-08 NOTE — TELEPHONE ENCOUNTER
Refill passed per [facility] protocol   Requested Prescriptions   Pending Prescriptions Disp Refills    METFORMIN  MG Oral Tablet 24 Hr [Pharmacy Med Name: Metformin Hydrochloride Er 24hr 500 Mg Tab Gran] 120 tablet 0     Sig: Take 4 tablets (2,000 mg total) by mouth every evening.         Diabetes Medication Protocol Passed - 10/7/2022  7:12 PM        Passed - Last A1C < 7.5 and within past 6 months     Lab Results   Component Value Date    A1C 6.6 (H) 06/07/2022               Passed - In person appointment or virtual visit in the past 6 mos or appointment in next 3 mos       Recent Outpatient Visits              3 days ago Environmental and seasonal allergies    [address], [address]    Nurse Only    2 weeks ago Essential hypertension, benign    [address], [address], [address] Domenic Menjivar MD    Office Visit    1 month ago Environmental and seasonal allergies    [address], [address] Only    1 month ago Environmental and seasonal allergies    [address], [address]    Nurse Only    2 months ago Environmental and seasonal allergies    [address], [address]    Nurse Only     Future Appointments         Provider Department Appt Notes    In 3 weeks [address]     In 1 month [address]     In 2 months [address], [address] or GFRNAA > 50     GFR Evaluation  EGFRCR: 80 , resulted on 8/19/2022            Passed - GFR in the past 12 months

## 2022-10-24 ENCOUNTER — NURSE TRIAGE (OUTPATIENT)
Dept: INTERNAL MEDICINE CLINIC | Facility: CLINIC | Age: 67
End: 2022-10-24

## 2022-10-24 RX ORDER — AZITHROMYCIN 250 MG/1
TABLET, FILM COATED ORAL
Qty: 6 TABLET | Refills: 0 | Status: SHIPPED | OUTPATIENT
Start: 2022-10-24 | End: 2022-10-29

## 2022-10-24 NOTE — TELEPHONE ENCOUNTER
Left message to call back and MyChart message sent.       ----- Message from Damien Mast RN sent at 10/24/2022 10:55 AM CDT -----  Regarding: FW: Sinus Infection      ----- Message -----  From: Chun Reich. Sent: 10/23/2022   6:00 PM CDT  To: Em Rn Triage  Subject: Sinus Infection                                  I came down with a sinus infection Sunday. Could you please send in a script for an antibiotic?   Thank you  Mitzi Espinosa

## 2022-10-28 ENCOUNTER — PATIENT MESSAGE (OUTPATIENT)
Dept: INTERNAL MEDICINE CLINIC | Facility: CLINIC | Age: 67
End: 2022-10-28

## 2022-10-28 DIAGNOSIS — E11.9 DIABETES MELLITUS WITHOUT COMPLICATION (HCC): Primary | ICD-10-CM

## 2022-10-29 NOTE — TELEPHONE ENCOUNTER
From: Rakesh Louis. To: Freedom Fields MD  Sent: 10/28/2022 3:44 PM CDT  Subject: Blood work    Could you please put in an order to have my blood drawn for my A1C?   Thank you,  Glo Sheth

## 2022-11-01 ENCOUNTER — NURSE ONLY (OUTPATIENT)
Dept: ALLERGY | Facility: CLINIC | Age: 67
End: 2022-11-01
Payer: MEDICARE

## 2022-11-01 DIAGNOSIS — J30.89 ENVIRONMENTAL AND SEASONAL ALLERGIES: ICD-10-CM

## 2022-11-01 PROCEDURE — 95117 IMMUNOTHERAPY INJECTIONS: CPT | Performed by: ALLERGY & IMMUNOLOGY

## 2022-11-02 ENCOUNTER — LAB ENCOUNTER (OUTPATIENT)
Dept: LAB | Age: 67
End: 2022-11-02
Attending: INTERNAL MEDICINE
Payer: MEDICARE

## 2022-11-02 DIAGNOSIS — E11.9 DIABETES MELLITUS WITHOUT COMPLICATION (HCC): ICD-10-CM

## 2022-11-02 LAB
EST. AVERAGE GLUCOSE BLD GHB EST-MCNC: 140 MG/DL (ref 68–126)
HBA1C MFR BLD: 6.5 % (ref ?–5.7)

## 2022-11-02 PROCEDURE — 83036 HEMOGLOBIN GLYCOSYLATED A1C: CPT

## 2022-11-02 PROCEDURE — 36415 COLL VENOUS BLD VENIPUNCTURE: CPT

## 2022-11-30 ENCOUNTER — NURSE ONLY (OUTPATIENT)
Dept: ALLERGY | Facility: CLINIC | Age: 67
End: 2022-11-30
Payer: MEDICARE

## 2022-11-30 DIAGNOSIS — J30.89 ENVIRONMENTAL AND SEASONAL ALLERGIES: ICD-10-CM

## 2022-11-30 PROCEDURE — 95117 IMMUNOTHERAPY INJECTIONS: CPT | Performed by: ALLERGY & IMMUNOLOGY

## 2022-12-02 RX ORDER — METFORMIN HYDROCHLORIDE 500 MG/1
2000 TABLET, EXTENDED RELEASE ORAL EVERY EVENING
Qty: 360 TABLET | Refills: 1 | Status: SHIPPED | OUTPATIENT
Start: 2022-12-02

## 2022-12-02 RX ORDER — LEVOCETIRIZINE DIHYDROCHLORIDE 5 MG/1
5 TABLET, FILM COATED ORAL NIGHTLY
Qty: 90 TABLET | Refills: 0 | Status: SHIPPED | OUTPATIENT
Start: 2022-12-02

## 2022-12-02 NOTE — TELEPHONE ENCOUNTER
Refill passed per [address] protocol. Requested Prescriptions   Pending Prescriptions Disp Refills    metFORMIN  MG Oral Tablet 24 Hr [Pharmacy Med Name: Metformin Hydrochloride Er 24hr 500 Mg Tab Gran] 360 tablet 1     Sig: Take 4 tablets (2,000 mg total) by mouth every evening.        Diabetes Medication Protocol Passed - 12/2/2022  1:30 AM        Passed - Last A1C < 7.5 and within past 6 months     Lab Results   Component Value Date    A1C 6.5 (H) 11/02/2022             Passed - In person appointment or virtual visit in the past 6 mos or appointment in next 3 mos     Recent Outpatient Visits              2 days ago Environmental and seasonal allergies    [address], [address] [place], [employer]    Nurse Only    1 month ago Environmental and seasonal allergies    [address], [address] [place], [employer]    Nurse Only    1 month ago Environmental and seasonal allergies    [address], [address] [place], [employer]    Nurse Only    2 months ago Essential hypertension, benign    [address], [address], [address] García Forrester MD    Office Visit    2 months ago Environmental and seasonal allergies    [address], [address] [place], [employer]    Nurse Only          Future Appointments         Provider Department Appt Notes    In 6 days Martha Knight MD [place] [facility], [address], [employer] Addressing my sinus issues    In 3 weeks [address]     In 1 month [address]     In 2 months [address]     In 3 months [address], [address] or GFRNAA > 50     GFR Evaluation  EGFRCR: 80 , resulted on 8/19/2022          Passed - GFR in the past 12 months              Recent Outpatient Visits              2 days ago Environmental and seasonal allergies    [address] Nurse Only    1 month ago Environmental and seasonal allergies    [facility], [facility], [address]    Nurse Only    1 month ago Environmental and seasonal allergies    [facility], [facility], [address]    Nurse Only    2 months ago Essential hypertension, benign    [facility], [facility], [address], [facility] Blanca Mckay MD    Office Visit    2 months ago Environmental and seasonal allergies    [facility], [facility], [address]    Nurse Only            Future Appointments         Provider Department Appt Notes    In 6 days Mart Mack MD [facility], [address], [employer] Addressing my sinus issues    In 3 weeks [address]     In 1 month [address]     In 2 months [address]     In 3 months [address]

## 2022-12-08 ENCOUNTER — OFFICE VISIT (OUTPATIENT)
Dept: OTOLARYNGOLOGY | Facility: CLINIC | Age: 67
End: 2022-12-08
Payer: MEDICARE

## 2022-12-08 ENCOUNTER — HOSPITAL ENCOUNTER (OUTPATIENT)
Age: 67
Discharge: HOME | End: 2022-12-08
Payer: MEDICARE

## 2022-12-08 VITALS
RESPIRATION RATE: 18 BRPM | SYSTOLIC BLOOD PRESSURE: 173 MMHG | WEIGHT: 227 LBS | HEIGHT: 74 IN | TEMPERATURE: 97 F | DIASTOLIC BLOOD PRESSURE: 85 MMHG | HEART RATE: 54 BPM | OXYGEN SATURATION: 97 % | BODY MASS INDEX: 29.13 KG/M2

## 2022-12-08 DIAGNOSIS — R09.82 POSTNASAL DISCHARGE: Primary | ICD-10-CM

## 2022-12-08 DIAGNOSIS — J06.9 UPPER RESPIRATORY VIRUS: Primary | ICD-10-CM

## 2022-12-08 LAB
POCT INFLUENZA A: NEGATIVE
POCT INFLUENZA B: NEGATIVE
SARS-COV-2 RNA RESP QL NAA+PROBE: NOT DETECTED

## 2022-12-08 PROCEDURE — 99213 OFFICE O/P EST LOW 20 MIN: CPT | Performed by: NURSE PRACTITIONER

## 2022-12-08 PROCEDURE — 87502 INFLUENZA DNA AMP PROBE: CPT | Performed by: NURSE PRACTITIONER

## 2022-12-08 PROCEDURE — U0002 COVID-19 LAB TEST NON-CDC: HCPCS | Performed by: NURSE PRACTITIONER

## 2022-12-08 PROCEDURE — 99213 OFFICE O/P EST LOW 20 MIN: CPT | Performed by: OTOLARYNGOLOGY

## 2022-12-08 RX ORDER — BENZONATATE 100 MG/1
200 CAPSULE ORAL 3 TIMES DAILY PRN
Qty: 30 CAPSULE | Refills: 0 | Status: SHIPPED | OUTPATIENT
Start: 2022-12-08 | End: 2023-01-07

## 2022-12-08 RX ORDER — PSEUDOEPHEDRINE HCL 120 MG/1
120 TABLET, FILM COATED, EXTENDED RELEASE ORAL EVERY 12 HOURS
Qty: 60 TABLET | Refills: 3 | Status: SHIPPED | OUTPATIENT
Start: 2022-12-08

## 2022-12-08 NOTE — DISCHARGE INSTRUCTIONS
Continue supportive care. Take the [facility] as needed. Follow-up as needed with your doctor. Return for any concerns. Repeat your blood pressure when you are feeling better.

## 2022-12-08 NOTE — ED INITIAL ASSESSMENT (HPI)
Pt here w c/o post nasal drip, HA, dry cough, sore throat and fatigue x 3 days. States saw ENT today because he thought it was sinus issues but was told it was not.

## 2022-12-27 ENCOUNTER — NURSE ONLY (OUTPATIENT)
Dept: ALLERGY | Facility: CLINIC | Age: 67
End: 2022-12-27
Payer: MEDICARE

## 2022-12-27 DIAGNOSIS — J30.89 ENVIRONMENTAL AND SEASONAL ALLERGIES: ICD-10-CM

## 2022-12-27 PROCEDURE — 95117 IMMUNOTHERAPY INJECTIONS: CPT | Performed by: ALLERGY & IMMUNOLOGY

## 2023-01-16 RX ORDER — SUMATRIPTAN 100 MG/1
100 TABLET, FILM COATED ORAL EVERY 2 HOUR PRN
Qty: 9 TABLET | Refills: 2 | Status: SHIPPED | OUTPATIENT
Start: 2023-01-16

## 2023-01-16 RX ORDER — GLIMEPIRIDE 4 MG/1
TABLET ORAL
Qty: 180 TABLET | Refills: 1 | Status: SHIPPED | OUTPATIENT
Start: 2023-01-16

## 2023-01-16 NOTE — TELEPHONE ENCOUNTER
Refill passed per [facility] protocol. Requested Prescriptions   Pending Prescriptions Disp Refills    SUMAtriptan Succinate 100 MG Oral Tab 9 tablet 11     Sig: Take 1 tablet (100 mg total) by mouth every 2 (two) hours as needed for Migraine.        Neurology Medications Passed - 1/16/2023  2:59 PM        Passed - In person appointment or virtual visit in the past 6 mos or appointment in next 3 mos     Recent Outpatient Visits              2 weeks ago Environmental and seasonal allergies    [facility], [address] [place] [address] [place]    Nurse Only    1 month ago Postnasal discharge    [address], [address], [place], Alec Box, MD    Office Visit    1 month ago Environmental and seasonal allergies    [facility], [address] [facility] [place]    Nurse Only    2 months ago Environmental and seasonal allergies    [address], [address] [facility] [place]    Nurse Only    3 months ago Environmental and seasonal allergies    [address], [address] [facility]    Nurse Only          Future Appointments         Provider Department Appt Notes    In 1 week [address]     In 1 month Beatriz Hoff MD [address], [address] [facility], [facility]    In 2 months [address]     In 3 months [address]     In 4 months [address]     In 4 months [address]

## 2023-01-16 NOTE — TELEPHONE ENCOUNTER
Refill passed per [place] [facility] [place] protocol.   Requested Prescriptions   Pending Prescriptions Disp Refills    glimepiride 4 MG Oral Tab 180 tablet 3     Sig: TAKE ONE TABLET BY MOUTH in the morning and take one tablet by mouth in the evening       Diabetes Medication Protocol Passed - 1/16/2023  4:32 PM        Passed - Last A1C < 7.5 and within past 6 months     Lab Results   Component Value Date    A1C 6.5 (H) 11/02/2022             Passed - In person appointment or virtual visit in the past 6 mos or appointment in next 3 mos     Recent Outpatient Visits              2 weeks ago Environmental and seasonal allergies    [facility], [address] [facility], [address] Only    1 month ago Postnasal discharge    [address], [address] [facility],[address], [place], Coco Mendes MD    Office Visit    1 month ago Environmental and seasonal allergies    [facility], [address] [facility] [place]    Nurse Only    2 months ago Environmental and seasonal allergies    [address], [facility] [place]    Nurse Only    3 months ago Environmental and seasonal allergies    [address], [address] [facility], [address]    Nurse Only          Future Appointments         Provider Department Appt Notes    In 1 week [address]     In 1 month Kg Alonzo MD [address], [address] [facility], [address] [place]    In 2 months [address]     In 3 months [address]     In 4 months [address]     In 4 months [address], [place]                Passed - [place] or GFRNAA > 50     GFR Evaluation  EGFRCR: 80 , resulted on 8/19/2022          Passed - GFR in the past 12 months Signed Prescriptions Disp Refills    SUMAtriptan Succinate 100 MG Oral Tab 9 tablet 2     Sig: Take 1 tablet (100 mg total) by mouth every 2 (two) hours as needed for Migraine.        Neurology Medications Passed - 1/16/2023  2:59 PM        Passed - In person appointment or virtual visit in the past 6 mos or appointment in next 3 mos     Recent Outpatient Visits              2 weeks ago Environmental and seasonal allergies    [facility], [address] [place], [place] [address]    Nurse Only    1 month ago Postnasal discharge    [address], [address] [facility],[address], Leslie Clifford MD    Office Visit    1 month ago Environmental and seasonal allergies    [facility], [address] [facility] [place]    Nurse Only    2 months ago Environmental and seasonal allergies    [address], [address] [facility] [place]    Nurse Only    3 months ago Environmental and seasonal allergies    [address], [address] [facility] [place]    Nurse Only          Future Appointments         Provider Department Appt Notes    In 1 week [address]     In 1 month Dalia Wright MD [address], [address] [facility], [facility]    In 2 months [address]     In 3 months [address]     In 4 months [address] [place] [address]     In 4 months [address]

## 2023-01-17 ENCOUNTER — TELEPHONE (OUTPATIENT)
Dept: ALLERGY | Facility: CLINIC | Age: 68
End: 2023-01-17

## 2023-01-17 NOTE — TELEPHONE ENCOUNTER
Spoke with patient. Verified name and . Patient requests to reschedule allergy shots. Rescheduled patient to 23 at 8:30 am. Patient accepted appointment.

## 2023-01-25 ENCOUNTER — NURSE ONLY (OUTPATIENT)
Dept: ALLERGY | Facility: CLINIC | Age: 68
End: 2023-01-25

## 2023-01-25 DIAGNOSIS — J30.89 ENVIRONMENTAL AND SEASONAL ALLERGIES: ICD-10-CM

## 2023-01-25 PROCEDURE — 95117 IMMUNOTHERAPY INJECTIONS: CPT | Performed by: ALLERGY & IMMUNOLOGY

## 2023-01-27 ENCOUNTER — TELEPHONE (OUTPATIENT)
Dept: INTERNAL MEDICINE CLINIC | Facility: CLINIC | Age: 68
End: 2023-01-27

## 2023-01-27 NOTE — TELEPHONE ENCOUNTER
Pharmacy calling to request clarification on Sumatriptan, need maximum per day and how long the 9 tablets are supposed to last.

## 2023-01-29 RX ORDER — SUMATRIPTAN 100 MG/1
TABLET, FILM COATED ORAL
Qty: 9 TABLET | Refills: 2 | Status: SHIPPED | OUTPATIENT
Start: 2023-01-29

## 2023-02-21 ENCOUNTER — NURSE ONLY (OUTPATIENT)
Dept: ALLERGY | Facility: CLINIC | Age: 68
End: 2023-02-21

## 2023-02-21 ENCOUNTER — OFFICE VISIT (OUTPATIENT)
Dept: ALLERGY | Facility: CLINIC | Age: 68
End: 2023-02-21

## 2023-02-21 VITALS
SYSTOLIC BLOOD PRESSURE: 154 MMHG | HEART RATE: 78 BPM | WEIGHT: 228 LBS | BODY MASS INDEX: 29 KG/M2 | DIASTOLIC BLOOD PRESSURE: 82 MMHG

## 2023-02-21 DIAGNOSIS — J30.89 ENVIRONMENTAL AND SEASONAL ALLERGIES: ICD-10-CM

## 2023-02-21 DIAGNOSIS — J30.2 PERENNIAL ALLERGIC RHINITIS WITH SEASONAL VARIATION: ICD-10-CM

## 2023-02-21 DIAGNOSIS — Z23 FLU VACCINE NEED: ICD-10-CM

## 2023-02-21 DIAGNOSIS — J30.89 PERENNIAL ALLERGIC RHINITIS WITH SEASONAL VARIATION: ICD-10-CM

## 2023-02-21 DIAGNOSIS — J45.20 MILD INTERMITTENT EXTRINSIC ASTHMA WITHOUT COMPLICATION: Primary | ICD-10-CM

## 2023-02-21 DIAGNOSIS — Z92.29 COVID-19 VACCINE SERIES COMPLETED: ICD-10-CM

## 2023-02-21 DIAGNOSIS — J32.9 RECURRENT SINUSITIS: ICD-10-CM

## 2023-02-21 PROCEDURE — 99214 OFFICE O/P EST MOD 30 MIN: CPT | Performed by: ALLERGY & IMMUNOLOGY

## 2023-02-21 PROCEDURE — 95117 IMMUNOTHERAPY INJECTIONS: CPT | Performed by: ALLERGY & IMMUNOLOGY

## 2023-03-01 RX ORDER — LEVOCETIRIZINE DIHYDROCHLORIDE 5 MG/1
5 TABLET, FILM COATED ORAL NIGHTLY
Qty: 90 TABLET | Refills: 2 | Status: SHIPPED | OUTPATIENT
Start: 2023-03-01

## 2023-03-21 ENCOUNTER — NURSE ONLY (OUTPATIENT)
Dept: ALLERGY | Facility: CLINIC | Age: 68
End: 2023-03-21

## 2023-03-21 DIAGNOSIS — J30.89 ENVIRONMENTAL AND SEASONAL ALLERGIES: ICD-10-CM

## 2023-03-21 PROCEDURE — 95117 IMMUNOTHERAPY INJECTIONS: CPT | Performed by: ALLERGY & IMMUNOLOGY

## 2023-04-18 ENCOUNTER — NURSE ONLY (OUTPATIENT)
Dept: ALLERGY | Facility: CLINIC | Age: 68
End: 2023-04-18
Payer: MEDICARE

## 2023-04-18 ENCOUNTER — OFFICE VISIT (OUTPATIENT)
Dept: PODIATRY CLINIC | Facility: CLINIC | Age: 68
End: 2023-04-18

## 2023-04-18 DIAGNOSIS — M21.612 BILATERAL BUNIONS: ICD-10-CM

## 2023-04-18 DIAGNOSIS — E11.42 TYPE 2 DIABETES MELLITUS WITH POLYNEUROPATHY (HCC): Primary | ICD-10-CM

## 2023-04-18 DIAGNOSIS — M21.611 BILATERAL BUNIONS: ICD-10-CM

## 2023-04-18 DIAGNOSIS — J30.89 ENVIRONMENTAL AND SEASONAL ALLERGIES: ICD-10-CM

## 2023-04-18 PROCEDURE — 99213 OFFICE O/P EST LOW 20 MIN: CPT | Performed by: PODIATRIST

## 2023-04-18 PROCEDURE — 1126F AMNT PAIN NOTED NONE PRSNT: CPT | Performed by: PODIATRIST

## 2023-04-18 PROCEDURE — 95117 IMMUNOTHERAPY INJECTIONS: CPT | Performed by: ALLERGY & IMMUNOLOGY

## 2023-04-30 ENCOUNTER — PATIENT MESSAGE (OUTPATIENT)
Dept: INTERNAL MEDICINE CLINIC | Facility: CLINIC | Age: 68
End: 2023-04-30

## 2023-04-30 DIAGNOSIS — E11.9 DIABETES MELLITUS WITHOUT COMPLICATION (HCC): Primary | ICD-10-CM

## 2023-05-02 ENCOUNTER — LAB ENCOUNTER (OUTPATIENT)
Dept: LAB | Age: 68
End: 2023-05-02
Attending: INTERNAL MEDICINE
Payer: MEDICARE

## 2023-05-02 DIAGNOSIS — E11.9 DIABETES MELLITUS WITHOUT COMPLICATION (HCC): ICD-10-CM

## 2023-05-02 LAB
EST. AVERAGE GLUCOSE BLD GHB EST-MCNC: 171 MG/DL (ref 68–126)
HBA1C MFR BLD: 7.6 % (ref ?–5.7)

## 2023-05-02 PROCEDURE — 36415 COLL VENOUS BLD VENIPUNCTURE: CPT

## 2023-05-02 PROCEDURE — 83036 HEMOGLOBIN GLYCOSYLATED A1C: CPT

## 2023-05-16 ENCOUNTER — NURSE ONLY (OUTPATIENT)
Dept: ALLERGY | Facility: CLINIC | Age: 68
End: 2023-05-16

## 2023-05-16 DIAGNOSIS — J30.89 ENVIRONMENTAL AND SEASONAL ALLERGIES: ICD-10-CM

## 2023-05-16 PROCEDURE — 95165 ANTIGEN THERAPY SERVICES: CPT | Performed by: ALLERGY & IMMUNOLOGY

## 2023-05-16 PROCEDURE — 95117 IMMUNOTHERAPY INJECTIONS: CPT | Performed by: ALLERGY & IMMUNOLOGY

## 2023-05-19 ENCOUNTER — APPOINTMENT (OUTPATIENT)
Dept: URBAN - METROPOLITAN AREA CLINIC 244 | Age: 68
Setting detail: DERMATOLOGY
End: 2023-05-23

## 2023-05-19 DIAGNOSIS — L82.1 OTHER SEBORRHEIC KERATOSIS: ICD-10-CM

## 2023-05-19 DIAGNOSIS — L81.4 OTHER MELANIN HYPERPIGMENTATION: ICD-10-CM

## 2023-05-19 DIAGNOSIS — D22 MELANOCYTIC NEVI: ICD-10-CM

## 2023-05-19 DIAGNOSIS — L57.0 ACTINIC KERATOSIS: ICD-10-CM

## 2023-05-19 DIAGNOSIS — L82.0 INFLAMED SEBORRHEIC KERATOSIS: ICD-10-CM

## 2023-05-19 PROBLEM — D22.5 MELANOCYTIC NEVI OF TRUNK: Status: ACTIVE | Noted: 2023-05-19

## 2023-05-19 PROCEDURE — 17110 DESTRUCT B9 LESION 1-14: CPT

## 2023-05-19 PROCEDURE — 17000 DESTRUCT PREMALG LESION: CPT | Mod: 59

## 2023-05-19 PROCEDURE — 17003 DESTRUCT PREMALG LES 2-14: CPT | Mod: 59

## 2023-05-19 PROCEDURE — OTHER LIQUID NITROGEN: OTHER

## 2023-05-19 PROCEDURE — 99203 OFFICE O/P NEW LOW 30 MIN: CPT | Mod: 25

## 2023-05-19 PROCEDURE — OTHER COUNSELING: OTHER

## 2023-05-19 ASSESSMENT — LOCATION DETAILED DESCRIPTION DERM
LOCATION DETAILED: LEFT PROXIMAL DORSAL FOREARM
LOCATION DETAILED: RIGHT CENTRAL MALAR CHEEK
LOCATION DETAILED: LEFT CENTRAL MALAR CHEEK
LOCATION DETAILED: RIGHT SUPERIOR MEDIAL UPPER BACK
LOCATION DETAILED: LEFT MEDIAL UPPER BACK
LOCATION DETAILED: UPPER STERNUM

## 2023-05-19 ASSESSMENT — LOCATION SIMPLE DESCRIPTION DERM
LOCATION SIMPLE: CHEST
LOCATION SIMPLE: LEFT FOREARM
LOCATION SIMPLE: RIGHT CHEEK
LOCATION SIMPLE: RIGHT UPPER BACK
LOCATION SIMPLE: LEFT UPPER BACK
LOCATION SIMPLE: LEFT CHEEK

## 2023-05-19 ASSESSMENT — LOCATION ZONE DERM
LOCATION ZONE: FACE
LOCATION ZONE: TRUNK
LOCATION ZONE: ARM

## 2023-05-22 RX ORDER — ATORVASTATIN CALCIUM 40 MG/1
40 TABLET, FILM COATED ORAL NIGHTLY
Qty: 90 TABLET | Refills: 3 | Status: SHIPPED | OUTPATIENT
Start: 2023-05-22

## 2023-05-22 NOTE — TELEPHONE ENCOUNTER
Refill passed per [place] [facility] protocol    Requested Prescriptions   Pending Prescriptions Disp Refills    ATORVASTATIN 40 MG Oral Tab [Pharmacy Med Name: Atorvastatin Calcium 40 Mg Tab Nort] 90 tablet 3     Sig: Take 1 tablet (40 mg total) by mouth nightly.        Cholesterol Medication Protocol Passed - 5/22/2023 10:30 AM        Passed - ALT in past 12 months        Passed - LDL in past 12 months        Passed - Last ALT < 80     Lab Results   Component Value Date    ALT 35 06/07/2022             Passed - Last LDL < 130     Lab Results   Component Value Date    LDL 41 06/07/2022               Passed - In person appointment or virtual visit in the past 12 mos or appointment in next 3 mos     Recent Outpatient Visits              6 days ago Environmental and seasonal allergies    [facility], [address] [place], [place]    Nurse Only    1 month ago Type 2 diabetes mellitus with polyneuropathy ([facility] [address]    [address], [place], [place], [place]    Office Visit    1 month ago Environmental and seasonal allergies    [facility], [address] [place], [place]    Nurse Only    2 months ago Environmental and seasonal allergies    [facility], [address] [place], [place]    Nurse Only    3 months ago Environmental and seasonal allergies    [address], [address] [place], [place]    Nurse Only          Future Appointments         Provider Department Appt Notes    In 3 weeks [address] [facility], [facility]     In 1 month [address], [place] Allergy Injection    In 2 months [facility], [address] [place], [place] Allergy Injection    In 3 months [facility], [address] [place], [place] Allergy Injection    In 4 months [facility], [address] [place], [place] Allergy Injection

## 2023-05-23 ENCOUNTER — MED REC SCAN ONLY (OUTPATIENT)
Dept: INTERNAL MEDICINE CLINIC | Facility: CLINIC | Age: 68
End: 2023-05-23

## 2023-05-30 ENCOUNTER — TELEPHONE (OUTPATIENT)
Dept: FAMILY MEDICINE CLINIC | Facility: CLINIC | Age: 68
End: 2023-05-30

## 2023-06-01 RX ORDER — METFORMIN HYDROCHLORIDE 500 MG/1
2000 TABLET, EXTENDED RELEASE ORAL EVERY EVENING
Qty: 360 TABLET | Refills: 0 | Status: SHIPPED | OUTPATIENT
Start: 2023-06-01

## 2023-06-01 NOTE — TELEPHONE ENCOUNTER
Please review. Protocol failed / Has no protocol. Routing to  On-Call as Dr. Jinny Grigsby is out of office. Requested Prescriptions   Pending Prescriptions Disp Refills    METFORMIN  MG Oral Tablet 24 Hr [Pharmacy Med Name: Metformin Hydrochloride Er 24hr 500 Mg Tab Gran] 360 tablet 0     Sig: Take 4 tablets (2,000 mg total) by mouth every evening.        Diabetes Medication Protocol Failed - 5/31/2023  1:31 AM        Failed - Last A1C < 7.5 and within past 6 months     Lab Results   Component Value Date    A1C 7.6 (H) 05/02/2023               Failed - In person appointment or virtual visit in the past 6 mos or appointment in next 3 mos     Recent Outpatient Visits              2 weeks ago Environmental and seasonal allergies    [facility], [address] [facility] [place]    Nurse Only    1 month ago Type 2 diabetes mellitus with polyneuropathy ([facility] [address]    [address], [place], [place], [place]    Office Visit    1 month ago Environmental and seasonal allergies    [facility], [address] [facility] [place]    Nurse Only    2 months ago Environmental and seasonal allergies    [address], [address] [facility] [place]    Nurse Only    3 months ago Environmental and seasonal allergies    [address], [address] [facility] [place]    Nurse Only          Future Appointments         Provider Department Appt Notes    In 1 week [address] [facility]     In 1 month [address], [place] Allergy Injection    In 2 months [address], [place] Allergy Injection    In 3 months [address], [place] Allergy Injection    In 3 months [facility], [address] [place] Allergy Injection               Passed - [place] or GFRNAA > 50     GFR Evaluation  EGFRCR: 80 , resulted on 8/19/2022            Passed - GFR in the past 12 months           Future Appointments         Provider Department Appt Notes    In 1 week [address] [facility], [place]     In 1 month [address], [place] Allergy Injection    In 2 months [facility], [address] [facility] [place], [place] Allergy Injection    In 3 months [facility], [address] [place], [place] Allergy Injection    In 3 months [facility], [address] [facility] [place], [place] Allergy Injection           Recent Outpatient Visits              2 weeks ago Environmental and seasonal allergies    [facility], [address] [facility] [address] [place]    Nurse Only    1 month ago Type 2 diabetes mellitus with polyneuropathy ([facility] [address]    [address], [address], [place], [place], [place]    Office Visit    1 month ago Environmental and seasonal allergies    [facility], [address]    Nurse Only    2 months ago Environmental and seasonal allergies    [address], [address]    Nurse Only    3 months ago Environmental and seasonal allergies    [address], [address] Only

## 2023-06-13 ENCOUNTER — NURSE ONLY (OUTPATIENT)
Dept: ALLERGY | Facility: CLINIC | Age: 68
End: 2023-06-13

## 2023-06-13 DIAGNOSIS — J30.89 ENVIRONMENTAL AND SEASONAL ALLERGIES: ICD-10-CM

## 2023-06-13 PROCEDURE — 95117 IMMUNOTHERAPY INJECTIONS: CPT | Performed by: ALLERGY & IMMUNOLOGY

## 2023-07-09 RX ORDER — GLIMEPIRIDE 4 MG/1
TABLET ORAL
Qty: 180 TABLET | Refills: 2 | Status: SHIPPED | OUTPATIENT
Start: 2023-07-09

## 2023-07-09 NOTE — TELEPHONE ENCOUNTER
Please review. Protocol Failed or has No Protocol.     Requested Prescriptions   Pending Prescriptions Disp Refills    GLIMEPIRIDE 4 MG Oral Tab [Pharmacy Med Name: Glimepiride 4 Mg Tab ] 180 tablet 0     Sig: TAKE ONE TABLET BY MOUTH in the morning and take one tablet by mouth in the evening       Diabetes Medication Protocol Failed - 7/9/2023  1:30 AM        Failed - Last A1C < 7.5 and within past 6 months     Lab Results   Component Value Date    A1C 7.6 (H) 05/02/2023             Failed - In person appointment or virtual visit in the past 6 mos or appointment in next 3 mos     Recent Outpatient Visits              3 weeks ago Environmental and seasonal allergies    [facility], [address] [place] [address] 14 Only    1 month ago Environmental and seasonal allergies    [facility], [address] [place] [address] [place]    Nurse Only    2 months ago Type 2 diabetes mellitus with polyneuropathy ([facility] [address]    [address] [place], [place], Wayna Hemp, [place]    Office Visit    2 months ago Environmental and seasonal allergies    [facility], [address] [facility] [address] Only    3 months ago Environmental and seasonal allergies    [address] [place], [address] [facility] [place]    Nurse Only          Future Appointments         Provider Department Appt Notes    In 2 days [address], [place] Allergy Injection    In 1 month [address], [place] Allergy Injection    In 1 month [address], [place] Allergy Injection    In 2 months [facility], [place] Allergy In[place]               Passed - EGFRCR or GFRNAA > 50     GFR Evaluation  EGFRCR: 80 , resulted on 8/19/2022          Passed - GFR in the past 12 months             Recent Outpatient Visits 3 weeks ago Environmental and seasonal allergies    [facility], [facility] [place]    Nurse Only    1 month ago Environmental and seasonal allergies    [facility], [address] [place] [address] [place]    Nurse Only    2 months ago Type 2 diabetes mellitus with polyneuropathy ([facility]    [address], [place], Shearon [place], [place]    Office Visit    2 months ago Environmental and seasonal allergies    [facility], [address] [facility] [place]    Nurse Only    3 months ago Environmental and seasonal allergies    [facility], [address] [place] [address] [place]    Nurse Only            Future Appointments         Provider Department Appt Notes    In 2 days [address], [place] Allergy Injection    In 1 month [address], [place] Allergy Injection    In 1 month [address], [place] Allergy Injection    In 2 months [facility] All[facility]

## 2023-07-11 ENCOUNTER — NURSE ONLY (OUTPATIENT)
Dept: ALLERGY | Facility: CLINIC | Age: 68
End: 2023-07-11

## 2023-07-11 DIAGNOSIS — J30.89 ENVIRONMENTAL AND SEASONAL ALLERGIES: Primary | ICD-10-CM

## 2023-07-11 PROCEDURE — 95117 IMMUNOTHERAPY INJECTIONS: CPT | Performed by: ALLERGY & IMMUNOLOGY

## 2023-08-08 ENCOUNTER — NURSE ONLY (OUTPATIENT)
Dept: ALLERGY | Facility: CLINIC | Age: 68
End: 2023-08-08

## 2023-08-08 DIAGNOSIS — J30.89 ENVIRONMENTAL AND SEASONAL ALLERGIES: Primary | ICD-10-CM

## 2023-08-08 PROCEDURE — 95117 IMMUNOTHERAPY INJECTIONS: CPT | Performed by: ALLERGY & IMMUNOLOGY

## 2023-08-16 RX ORDER — MONTELUKAST SODIUM 10 MG/1
10 TABLET ORAL NIGHTLY
Qty: 90 TABLET | Refills: 0 | Status: SHIPPED | OUTPATIENT
Start: 2023-08-16

## 2023-08-16 NOTE — TELEPHONE ENCOUNTER
Refill passed per [place] [employer], [facility] protocol. Requested Prescriptions   Pending Prescriptions Disp Refills    MONTELUKAST 10 MG Oral Tab [Pharmacy Med Name: Montelukast Sodium 10 Mg Tab Auro] 90 tablet 0     Sig: Take 1 tablet (10 mg total) by mouth nightly.        Asthma & COPD Medication Protocol Passed - 8/16/2023  1:31 AM        Passed - In person appointment or virtual visit in the past 6 mos or appointment in next 3 mos     Recent Outpatient Visits              1 week ago Environmental and seasonal allergies    [facility]    Nurse Only    1 month ago Environmental and seasonal allergies [J30.89 (ICD-10-CM)]    [address], [address] [facility]    Nurse Only    2 months ago Environmental and seasonal allergies    [facility], [facility] [place]    Nurse Only    3 months ago Environmental and seasonal allergies    [facility], [address] [place], [place] [address]    Nurse Only    4 months ago Type 2 diabetes mellitus with polyneuropathy ([facility] [address]    [address], [place], Nora Rams[place], [place]    Office Visit          Future Appointments         Provider Department Appt Notes    In 2 weeks [address], [place] Allergy Injection    In 2 weeks [address], [place] allergy shots    In 1 month [facility], [facility] [place] Allergy Injection    In 1 month [address], [place] allergy shots    In 2 months [facility], [address] [facility], [place] allergy shots    In 2 months Elijah Weathers MD [address], [address], [facility] Overall health assessment    In 3 months [address], [place] allergy shots    In 4 months [facility], [place] allergy shots                    Recent Outpatient Visits              1 week ago Environmental and seasonal allergies    [facility], [address], [place]    Nurse Only    1 month ago Environmental and seasonal allergies [J30.89 (ICD-10-CM)]    [address], [address] [facility], [place]    Nurse Only    2 months ago Environmental and seasonal allergies    [facility], [address] [facility], [place]    Nurse Only    3 months ago Environmental and seasonal allergies    [facility], [address] [facility], [place]    Nurse Only    4 months ago Type 2 diabetes mellitus with polyneuropathy ([facility] [address]    [address], [place], Rudi Persons, [place]    Office Visit            Future Appointments         Provider Department Appt Notes    In 2 weeks [address], [place] Allergy Injection    In 2 weeks [address] [place], [place] allergy shots    In 1 month [address], [place] Allergy Injection    In 1 month [address], [place] allergy shots    In 2 months [address], [place] allergy shots    In 2 months Sarah Portillo MD [address], [address], [address] Overall health assessment    In 3 months [address] [place], [place] allergy shots    In 4 months [address], [place] allergy shots

## 2023-08-26 NOTE — TELEPHONE ENCOUNTER
Please review. Protocol failed / No protocol.    Requested Prescriptions   Pending Prescriptions Disp Refills    METFORMIN  MG Oral Tablet 24 Hr [Pharmacy Med Name: Metformin Hydrochloride Er 24hr 500 Mg Tab Gran] 360 tablet 0     Sig: take 4 tablets (2,000 mg total) by mouth every evening       Diabetes Medication Protocol Failed - 8/26/2023  1:32 AM        Failed - Last A1C < 7.5 and within past 6 months     Lab Results   Component Value Date    A1C 7.6 (H) 05/02/2023             Failed - EGFRCR or GFRNAA > 50     GFR Evaluation            Failed - GFR in the past 12 months        Passed - In person appointment or virtual visit in the past 6 mos or appointment in next 3 mos     Recent Outpatient Visits              2 weeks ago Environmental and seasonal allergies    [facility], [address] [place], [place] [address]Sh[place]omo giraldo    Nurse Only    1 month ago Environmental and seasonal allergies [J30.89 (ICD-10-CM)]    [facility], [address] [place], [place] [address]paho[place] wheeler    Nurse Only    2 months ago Environmental and seasonal allergies    [facility], [address] [place], [place] [address]    Nurse Only    3 months ago Environmental and seasonal allergies    [facility], [address] [place], [place] [address]    Nurse Only    4 months ago Type 2 diabetes mellitus with polyneuropathy ([facility]    [address], [place], Sinda [place], [place]    Office Visit          Future Appointments         Provider Department Appt Notes    In 1 week [facility], [facility] [place] Allergy Injection    In 3 weeks Champ Romano MD [address], [address], [facility] Overall health assessment    In 1 month [address], [place] Allergy Injection    In 1 month [address], [place] allergy shots    In 2 months [facility], [address] [place], [place] allergy shots    In 3 months [facility], [address] [place], [place] allergy shots                   Recent Outpatient Visits              2 weeks ago Environmental and seasonal allergies    [facility], [address] [place], [place]    Nurse Only    1 month ago Environmental and seasonal allergies [J30.89 (ICD-10-CM)]    [address], [facility] [place]    Nurse Only    2 months ago Environmental and seasonal allergies    [facility], [address] [facility] [place], [place]    Nurse Only    3 months ago Environmental and seasonal allergies    [facility], [facility], [place]    Nurse Only    4 months ago Type 2 diabetes mellitus with polyneuropathy ([facility] [address]    [address], [place], Karthik [place], [place]    Office Visit           Future Appointments         Provider Department Appt Notes    In 1 week [address], [place] Allergy Injection    In 3 weeks Rima Smith MD [facility] Overall health assessment    In 1 month [address], [place] Allergy Injection    In 1 month 156 [facility] allergy shots    In 2 months [address] allergy shots    In 3 months [address], [place] allergy shots

## 2023-08-28 RX ORDER — METFORMIN HYDROCHLORIDE 500 MG/1
2000 TABLET, EXTENDED RELEASE ORAL EVERY EVENING
Qty: 360 TABLET | Refills: 3 | Status: SHIPPED | OUTPATIENT
Start: 2023-08-28

## 2023-09-05 ENCOUNTER — NURSE ONLY (OUTPATIENT)
Dept: ALLERGY | Facility: CLINIC | Age: 68
End: 2023-09-05

## 2023-09-05 DIAGNOSIS — J30.89 ENVIRONMENTAL AND SEASONAL ALLERGIES: Primary | ICD-10-CM

## 2023-09-05 PROCEDURE — 95117 IMMUNOTHERAPY INJECTIONS: CPT | Performed by: ALLERGY & IMMUNOLOGY

## 2023-09-19 ENCOUNTER — LAB ENCOUNTER (OUTPATIENT)
Dept: LAB | Age: 68
End: 2023-09-19
Attending: INTERNAL MEDICINE
Payer: MEDICARE

## 2023-09-19 ENCOUNTER — OFFICE VISIT (OUTPATIENT)
Dept: INTERNAL MEDICINE CLINIC | Facility: CLINIC | Age: 68
End: 2023-09-19

## 2023-09-19 VITALS
HEART RATE: 68 BPM | BODY MASS INDEX: 29.82 KG/M2 | DIASTOLIC BLOOD PRESSURE: 74 MMHG | WEIGHT: 225 LBS | SYSTOLIC BLOOD PRESSURE: 118 MMHG | HEIGHT: 73 IN | TEMPERATURE: 99 F

## 2023-09-19 DIAGNOSIS — Z00.00 MEDICARE ANNUAL WELLNESS VISIT, SUBSEQUENT: Primary | ICD-10-CM

## 2023-09-19 DIAGNOSIS — Z12.5 PROSTATE CANCER SCREENING: ICD-10-CM

## 2023-09-19 DIAGNOSIS — E78.00 PURE HYPERCHOLESTEROLEMIA: ICD-10-CM

## 2023-09-19 DIAGNOSIS — I10 ESSENTIAL HYPERTENSION, BENIGN: ICD-10-CM

## 2023-09-19 DIAGNOSIS — E11.9 DIABETES MELLITUS WITHOUT COMPLICATION (HCC): ICD-10-CM

## 2023-09-19 DIAGNOSIS — Z12.11 COLON CANCER SCREENING: ICD-10-CM

## 2023-09-19 PROBLEM — R07.89 ATYPICAL CHEST PAIN: Status: RESOLVED | Noted: 2022-09-20 | Resolved: 2023-09-19

## 2023-09-19 LAB
ALBUMIN SERPL-MCNC: 4.5 G/DL (ref 3.4–5)
ALBUMIN/GLOB SERPL: 1.4 {RATIO} (ref 1–2)
ALP LIVER SERPL-CCNC: 53 U/L
ALT SERPL-CCNC: 38 U/L
ANION GAP SERPL CALC-SCNC: 5 MMOL/L (ref 0–18)
AST SERPL-CCNC: 22 U/L (ref 15–37)
BASOPHILS # BLD AUTO: 0.04 X10(3) UL (ref 0–0.2)
BASOPHILS NFR BLD AUTO: 0.6 %
BILIRUB SERPL-MCNC: 0.6 MG/DL (ref 0.1–2)
BUN BLD-MCNC: 19 MG/DL (ref 7–18)
BUN/CREAT SERPL: 20.2 (ref 10–20)
CALCIUM BLD-MCNC: 9.5 MG/DL (ref 8.5–10.1)
CHLORIDE SERPL-SCNC: 106 MMOL/L (ref 98–112)
CHOLEST SERPL-MCNC: 137 MG/DL (ref ?–200)
CO2 SERPL-SCNC: 32 MMOL/L (ref 21–32)
COMPLEXED PSA SERPL-MCNC: 1.61 NG/ML (ref ?–4)
CREAT BLD-MCNC: 0.94 MG/DL
CREAT UR-SCNC: 123 MG/DL
DEPRECATED RDW RBC AUTO: 41.2 FL (ref 35.1–46.3)
EGFRCR SERPLBLD CKD-EPI 2021: 88 ML/MIN/1.73M2 (ref 60–?)
EOSINOPHIL # BLD AUTO: 0.07 X10(3) UL (ref 0–0.7)
EOSINOPHIL NFR BLD AUTO: 1 %
ERYTHROCYTE [DISTWIDTH] IN BLOOD BY AUTOMATED COUNT: 12.4 % (ref 11–15)
EST. AVERAGE GLUCOSE BLD GHB EST-MCNC: 154 MG/DL (ref 68–126)
FASTING PATIENT LIPID ANSWER: YES
FASTING STATUS PATIENT QL REPORTED: YES
GLOBULIN PLAS-MCNC: 3.2 G/DL (ref 2.8–4.4)
GLUCOSE BLD-MCNC: 159 MG/DL (ref 70–99)
HBA1C MFR BLD: 7 % (ref ?–5.7)
HCT VFR BLD AUTO: 44.4 %
HDLC SERPL-MCNC: 56 MG/DL (ref 40–59)
HGB BLD-MCNC: 15.1 G/DL
IMM GRANULOCYTES # BLD AUTO: 0.02 X10(3) UL (ref 0–1)
IMM GRANULOCYTES NFR BLD: 0.3 %
LDLC SERPL CALC-MCNC: 68 MG/DL (ref ?–100)
LYMPHOCYTES # BLD AUTO: 1.53 X10(3) UL (ref 1–4)
LYMPHOCYTES NFR BLD AUTO: 21.3 %
MCH RBC QN AUTO: 30.9 PG (ref 26–34)
MCHC RBC AUTO-ENTMCNC: 34 G/DL (ref 31–37)
MCV RBC AUTO: 91 FL
MICROALBUMIN UR-MCNC: 14.4 MG/DL
MICROALBUMIN/CREAT 24H UR-RTO: 117.1 UG/MG (ref ?–30)
MONOCYTES # BLD AUTO: 0.43 X10(3) UL (ref 0.1–1)
MONOCYTES NFR BLD AUTO: 6 %
NEUTROPHILS # BLD AUTO: 5.09 X10 (3) UL (ref 1.5–7.7)
NEUTROPHILS # BLD AUTO: 5.09 X10(3) UL (ref 1.5–7.7)
NEUTROPHILS NFR BLD AUTO: 70.8 %
NONHDLC SERPL-MCNC: 81 MG/DL (ref ?–130)
OSMOLALITY SERPL CALC.SUM OF ELEC: 302 MOSM/KG (ref 275–295)
PLATELET # BLD AUTO: 195 10(3)UL (ref 150–450)
POTASSIUM SERPL-SCNC: 3.8 MMOL/L (ref 3.5–5.1)
PROT SERPL-MCNC: 7.7 G/DL (ref 6.4–8.2)
RBC # BLD AUTO: 4.88 X10(6)UL
SODIUM SERPL-SCNC: 143 MMOL/L (ref 136–145)
T4 FREE SERPL-MCNC: 0.9 NG/DL (ref 0.8–1.7)
TRIGL SERPL-MCNC: 61 MG/DL (ref 30–149)
TSI SER-ACNC: 1.37 MIU/ML (ref 0.36–3.74)
VLDLC SERPL CALC-MCNC: 9 MG/DL (ref 0–30)
WBC # BLD AUTO: 7.2 X10(3) UL (ref 4–11)

## 2023-09-19 PROCEDURE — G0439 PPPS, SUBSEQ VISIT: HCPCS | Performed by: INTERNAL MEDICINE

## 2023-09-19 PROCEDURE — 83036 HEMOGLOBIN GLYCOSYLATED A1C: CPT | Performed by: INTERNAL MEDICINE

## 2023-09-19 PROCEDURE — 84443 ASSAY THYROID STIM HORMONE: CPT

## 2023-09-19 PROCEDURE — 1126F AMNT PAIN NOTED NONE PRSNT: CPT | Performed by: INTERNAL MEDICINE

## 2023-09-19 PROCEDURE — 84439 ASSAY OF FREE THYROXINE: CPT

## 2023-09-19 PROCEDURE — 82570 ASSAY OF URINE CREATININE: CPT

## 2023-09-19 PROCEDURE — 82043 UR ALBUMIN QUANTITATIVE: CPT

## 2023-09-19 PROCEDURE — 85025 COMPLETE CBC W/AUTO DIFF WBC: CPT

## 2023-09-19 PROCEDURE — 36415 COLL VENOUS BLD VENIPUNCTURE: CPT

## 2023-09-19 PROCEDURE — 80053 COMPREHEN METABOLIC PANEL: CPT

## 2023-09-19 PROCEDURE — 80061 LIPID PANEL: CPT

## 2023-09-19 RX ORDER — AZITHROMYCIN 250 MG/1
TABLET, FILM COATED ORAL
Qty: 6 TABLET | Refills: 0 | Status: SHIPPED | OUTPATIENT
Start: 2023-09-19 | End: 2023-09-23

## 2023-09-19 RX ORDER — TAMSULOSIN HYDROCHLORIDE 0.4 MG/1
0.4 CAPSULE ORAL NIGHTLY
Qty: 30 CAPSULE | Refills: 0 | Status: SHIPPED | OUTPATIENT
Start: 2023-09-19 | End: 2023-10-19

## 2023-09-19 RX ORDER — ZOLPIDEM TARTRATE 5 MG/1
5 TABLET ORAL NIGHTLY PRN
Qty: 20 TABLET | Refills: 0 | Status: SHIPPED | OUTPATIENT
Start: 2023-09-19

## 2023-09-25 RX ORDER — PANTOPRAZOLE SODIUM 40 MG/1
40 TABLET, DELAYED RELEASE ORAL
Qty: 90 TABLET | Refills: 3 | Status: SHIPPED | OUTPATIENT
Start: 2023-09-25

## 2023-09-25 NOTE — TELEPHONE ENCOUNTER
Refill passed per [address] [place] protocol.   Requested Prescriptions   Pending Prescriptions Disp Refills    PANTOPRAZOLE 40 MG Oral Tab EC [Pharmacy Med Name: Pantoprazole Sodium Ec 40 Mg Tab Auro] 30 tablet 0     Sig: TAKE ONE TABLET BY MOUTH EVERY MORNING BEFORE BREAKFAST       Gastrointestional Medication Protocol Passed - 9/25/2023  1:32 AM        Passed - In person appointment or virtual visit in the past 12 mos or appointment in next 3 mos     Recent Outpatient Visits              6 days ago Medicare annual wellness visit, subsequent    [facility], Ramy Valle MD    Office Visit    2 weeks ago Environmental and seasonal allergies    [facility], [address] [place] [address] [place]    Nurse Only    1 month ago Environmental and seasonal allergies    [facility], [address] [place], [place] [address]    Nurse Only    2 months ago Environmental and seasonal allergies [J30.89 (ICD-10-CM)]    [address], [address] [facility] [place]    Nurse Only    3 months ago Environmental and seasonal allergies    [address], [address] [facility] [place]    Nurse Only          Future Appointments         Provider Department Appt Notes    Tomorrow EC [facility], [facility] [place] Allergy [facility]    In 4 weeks [facility], [facility] [place] allergy shots    In 1 month [facility], [facility] allergy shots    In 2 months [address] [facility], [facility] allergy shots                       Recent Outpatient Visits              6 days ago Shreyas Salazar annual wellness visit, subsequent    Vianney Dejesus MD    Office Visit    2 weeks ago Environmental and seasonal allergies    [address] [place], [address] [facility] Jazzmine [address] Only 1 month ago Environmental and seasonal allergies    [facility], [address] [place] [address] [place]    Nurse Only    2 months ago Environmental and seasonal allergies [J30.89 (ICD-10-CM)]    [facility], [facility] [place]    Nurse Only    3 months ago Environmental and seasonal allergies    [facility], [address] [place] [address] [place]    Nurse Only          Future Appointments         Provider Department Appt Notes    Tomorrow EC [facility], [facility] [place] Allergy Injection    In 4 weeks [facility] allergy shots    In 1 month [address] allergy shots    In 2 months Tammy [address] [place], [place] [address] allergy shots

## 2023-09-26 ENCOUNTER — NURSE ONLY (OUTPATIENT)
Dept: ALLERGY | Facility: CLINIC | Age: 68
End: 2023-09-26

## 2023-09-26 DIAGNOSIS — J30.89 ENVIRONMENTAL AND SEASONAL ALLERGIES: Primary | ICD-10-CM

## 2023-09-26 PROCEDURE — 95117 IMMUNOTHERAPY INJECTIONS: CPT | Performed by: ALLERGY & IMMUNOLOGY

## 2023-10-12 RX ORDER — METOPROLOL SUCCINATE 50 MG/1
50 TABLET, EXTENDED RELEASE ORAL DAILY
Qty: 90 TABLET | Refills: 0 | Status: SHIPPED | OUTPATIENT
Start: 2023-10-12

## 2023-10-12 RX ORDER — TAMSULOSIN HYDROCHLORIDE 0.4 MG/1
0.4 CAPSULE ORAL NIGHTLY
Qty: 30 CAPSULE | Refills: 0 | Status: SHIPPED | OUTPATIENT
Start: 2023-10-12 | End: 2023-11-11

## 2023-10-20 ENCOUNTER — MED REC SCAN ONLY (OUTPATIENT)
Dept: INTERNAL MEDICINE CLINIC | Facility: CLINIC | Age: 68
End: 2023-10-20

## 2023-10-20 RX ORDER — ALBUTEROL SULFATE 90 UG/1
2 AEROSOL, METERED RESPIRATORY (INHALATION)
Qty: 3 EACH | Refills: 3 | Status: SHIPPED | OUTPATIENT
Start: 2023-10-20

## 2023-10-20 NOTE — TELEPHONE ENCOUNTER
Refill passed per [place] [employer], [facility] protocol. Requested Prescriptions   Pending Prescriptions Disp Refills    albuterol (PROAIR HFA) 108 (90 Base) MCG/ACT Inhalation Aero Soln 3 each 3     Sig: Inhale 2 puffs into the lungs every 4 to 6 hours as needed for Wheezing.        Asthma & COPD Medication Protocol Passed - 10/20/2023  2:41 PM        Passed - In person appointment or virtual visit in the past 6 mos or appointment in next 3 mos     Recent Outpatient Visits              3 weeks ago [facility] and [facility]    [facility], [address] [place]    Nurse Only    1 month ago Shreyas Salazar annual wellness visit, subsequent    [address], Jackelin Varela MD    Office Visit    1 month ago Environmental and seasonal allergies    [facility], [address] [place]    Nurse Only    2 months ago Environmental and seasonal allergies    [facility], [facility] [place]    Nurse Only    3 months ago Environmental and seasonal allergies [J30.89 (ICD-10-CM)]    Francisca Quarles, João Bliss    Nurse Only          Future Appointments         Provider Department Appt Notes    In 4 days 156 [facility], 148 João [place] allergy shots    In 1 month [address] allergy shots    In 2 months [address], [place] allergy shots

## 2023-10-20 NOTE — TELEPHONE ENCOUNTER
----- Message from Arelis Aguiar RN sent at 10/20/2023  1:59 PM CDT -----  Regarding: FW: Refill  Contact: [phone]    ----- Message -----  From: Trevor Mederos  Sent: 10/20/2023  10:01 AM CDT  To: Em Triage Support  Subject: Refill                                           Could you please put in for a refill for my Albuterol inhaler?

## 2023-10-24 ENCOUNTER — NURSE ONLY (OUTPATIENT)
Dept: ALLERGY | Facility: CLINIC | Age: 68
End: 2023-10-24

## 2023-10-24 DIAGNOSIS — J30.89 ENVIRONMENTAL AND SEASONAL ALLERGIES: Primary | ICD-10-CM

## 2023-10-24 PROCEDURE — 95117 IMMUNOTHERAPY INJECTIONS: CPT | Performed by: ALLERGY & IMMUNOLOGY

## 2023-10-31 ENCOUNTER — TELEPHONE (OUTPATIENT)
Facility: CLINIC | Age: 68
End: 2023-10-31

## 2023-10-31 DIAGNOSIS — Z80.0 FAMILY HISTORY OF COLON CANCER: Primary | ICD-10-CM

## 2023-10-31 NOTE — TELEPHONE ENCOUNTER
Last Procedure, Date, MD:  04/13/2017  Colonoscopy, Dr Jose A Suazo  Last Diagnosis:  family hx of colon cancer  Recalled (mth/yrs): 7 years  Sedation Used Previously:  MAC  Last Prep Used (if known):  Colyte  Quality Of Prep (if known): good  Anticoagulants: no  Diabetic Med's (PO/Injectables): glimepiride, metformin  Weight loss Med's: no  Iron/Herbal/Multivitamin Supplements (RX/OTC): multivitamin and fish oil  Marijuana/Vaping/CBD:  Height & Weight: 6'1\"/225  BMI: 29.69  Hx of Cardiac/CVA Issues/(MI/Stroke): no  Devices Pacemaker/Defibrillator/Stents: no  Respiratory Issues/Oxygen Use/ALEX/COPD:ALEX/CPAP  Issues w/ Anesthesia: no    Symptoms (Y/N): no  Symptoms Details: N/A    Special Comments/Notes: Medications, allergies and pharmacy verified with the pt    Please advise on orders and prep. Thank you!

## 2023-10-31 NOTE — TELEPHONE ENCOUNTER
[address]  MR #:   [id number] :   7/10/1955 [facility]  PHYSICIAN:  Lynsey Verduzco M.D. Operative Report    DATE OF PROCEDURE: 2017    PREOPERATIVE DIAGNOSIS:    Family history of colon cancer in a parent. POSTOPERATIVE DIAGNOSIS:    See impression. PROCEDURES:    Colonoscopy. SURGEON:    Lynsey Verduzco M.D.       SEDATION: MAC anesthesia provided by the anesthesia service. MAC anesthesia requested due to increased risk neck and airway, history of obstructive sleep apnea. INDICATIONS:            OPERATIVE PROCEDURE:  After the nature and risks of colonoscopy examination under MAC anesthesia were discussed with Mr. Caleb Houston and his questions answered, his informed consent was obtained. He was interviewed and examined, sedated by Dr. Karen Aguirre of anesthesia. Once sedated, digital rectal exam was performed which showed no masses. The Olympus pediatric video colonoscope was placed in the patient's rectum and advanced under direct visualization through the entire length of the colon up to the cecum, confirmed by landmarks including appendiceal orifice, cecal strap, and ileocecal valve. The quality of the prep was good. Retroflexion was performed up the ascending colon and across to the proximal transverse and down in the rectum. COLONOSCOPY FINDINGS:  The cecum, ileocecal valve, and ascending colon were entirely normal.  No polyps on retroflexed views. The transverse colon, splenic flexure were normal.  Mild, early colonic diverticulosis seen down the descending and sigmoid. Forward and retroflexed views in the rectum showed small internal hemorrhoids. The scope was straightened, air suctioned out, and the scope withdrawn from the patient. He tolerated today's procedure well. IMPRESSION:  Mild, early colonic diverticulosis.   No polyps found today in this patient with family history of colon cancer who states no polyps on multiple previous colonoscopy examinations. RECOMMENDATIONS:    1. High fiber diet. 2.  Repeat colonoscopy examination 5-10 years. Mr. Paola Pierre does not believe he has ever been diagnosed with colon polyps. Electronically Approved by:  Luan Morrell  cc: Manny Alexander.  Amadeo Burgos    TID: [id number]  DD: 04/13/2017 1:56:39 PM  DT: 04/13/2017 9:30:42 PM  vm/rk  ===============================

## 2023-10-31 NOTE — TELEPHONE ENCOUNTER
Pt called to schedule repeat colon due in Spring of 2024. Pt would like to get it on the schedule if possible. Please call.

## 2023-11-08 NOTE — TELEPHONE ENCOUNTER
Last Procedure, Date, MD:  04/13/2017  Colonoscopy, Dr Danyell Tee  Last Diagnosis:  family hx of colon cancer  Recalled (mth/yrs): 7 years  Sedation Used Previously:  MAC  Last Prep Used (if known):  Colyte  Quality Of Prep (if known): good  Anticoagulants:  Diabetic Med's (PO/Injectables): glimepiride, metformin  Weight loss Med's:  Iron/Herbal/Multivitamin Supplements (RX/OTC):  Marijuana/Vaping/CBD:  Height & Weight: 6'1\"/225  BMI: 29.69  Hx of Cardiac/CVA Issues/(MI/Stroke):  Devices Pacemaker/Defibrillator/Stents:  Respiratory Issues/Oxygen Use/ALEX/COPD:  Issues w/ Anesthesia:    Symptoms (Y/N):   Symptoms Details:     Special Comments/Notes:    Please advise on orders and prep. Thank you!

## 2023-11-10 RX ORDER — MONTELUKAST SODIUM 10 MG/1
10 TABLET ORAL NIGHTLY
Qty: 90 TABLET | Refills: 0 | Status: SHIPPED | OUTPATIENT
Start: 2023-11-10 | End: 2024-02-06

## 2023-11-10 RX ORDER — TAMSULOSIN HYDROCHLORIDE 0.4 MG/1
0.4 CAPSULE ORAL NIGHTLY
Qty: 30 CAPSULE | Refills: 0 | Status: SHIPPED | OUTPATIENT
Start: 2023-11-10 | End: 2023-12-07

## 2023-11-10 NOTE — TELEPHONE ENCOUNTER
Refill passed per [facility] protocol.     Requested Prescriptions   Pending Prescriptions Disp Refills    MONTELUKAST 10 MG Oral Tab [Pharmacy Med Name: Montelukast Sodium 10 Mg Tab Auro] 90 tablet 0     Sig: Take 1 tablet (10 mg total) by mouth nightly.       Asthma & COPD Medication Protocol Passed - 11/10/2023  1:31 AM        Passed - In person appointment or virtual visit in the past 6 mos or appointment in next 3 mos     Recent Outpatient Visits              2 weeks ago Environmental and seasonal allergies    [facility]    Nurse Only    1 month ago Environmental and seasonal allergies    [facility]    Nurse Only    1 month ago Medicare annual wellness visit, subsequent    [facility] Sincere Greenberg MD    Office Visit    2 months ago Environmental and seasonal allergies    [facility]    Nurse Only    3 months ago Environmental and seasonal allergies    [facility]    Nurse Only          Future Appointments         Provider Department Appt Notes    In 1 week EC ALLERGY [facility] allergy shots    In 1 month EC ALLERGY [facility] allergy shots                 TAMSULOSIN 0.4 MG Oral Cap [Pharmacy Med Name: Tamsulosin Hydrochloride 0.4 Mg Cap Nort] 30 capsule 0     Sig: TAKE ONE CAPSULE BY MOUTH AT BEDTIME       Genitourinary Medications Passed - 11/10/2023  1:31 AM        Passed - Patient does not have pulmonary hypertension on problem list        Passed - In person appointment or virtual visit in the past 12 mos or appointment in next 3 mos     Recent Outpatient Visits              2 weeks ago Environmental and seasonal allergies    [facility] [place]    Nurse  Only    1 month ago [facility] and [facility]    [facility]    Nurse Only    1 month ago Medicare annual wellness visit, subsequent    [facility] Since[facility] Green[facility]    Office Visit    2 months ago Environmental and seasonal allergies    [facility] [place]    Nurse Only    3 months ago Environmental and seasonal allergies    [facility]    Nurse Only          Future Appointments         Provider Department Appt Notes    In 1 week [facility] [place] allergy shots    In 1 month [facility], [facility] [place] allergy shots                     Recent Outpatient Visits              2 weeks ago Environmental and seasonal allergies    [facility]    Nurse Only    1 month ago Environmental and seasonal allergies    [facility]    Nurse Only    1 month ago Medicare annual wellness visit, subsequent    [facility] Sincere Greenberg MD    Office Visit    2 months ago Environmental and seasonal allergies    [facility]    Nurse Only    3 months ago Environmental and seasonal allergies    [facility]    Nurse Only             Future Appointments         Provider Department Appt Notes    In 1 week [facility] [place] allergy shots    In 1 month [facility] [place] allergy shots

## 2023-11-15 RX ORDER — MONTELUKAST SODIUM 10 MG/1
10 TABLET ORAL NIGHTLY
Qty: 90 TABLET | Refills: 0 | OUTPATIENT
Start: 2023-11-15

## 2023-11-17 ENCOUNTER — ORDER TRANSCRIPTION (OUTPATIENT)
Dept: ADMINISTRATIVE | Facility: HOSPITAL | Age: 68
End: 2023-11-17

## 2023-11-17 DIAGNOSIS — Z13.6 SCREENING FOR CARDIOVASCULAR CONDITION: Primary | ICD-10-CM

## 2023-11-21 ENCOUNTER — NURSE ONLY (OUTPATIENT)
Dept: ALLERGY | Facility: CLINIC | Age: 68
End: 2023-11-21

## 2023-11-21 DIAGNOSIS — J30.89 ENVIRONMENTAL AND SEASONAL ALLERGIES: Primary | ICD-10-CM

## 2023-11-21 PROCEDURE — 95117 IMMUNOTHERAPY INJECTIONS: CPT | Performed by: ALLERGY & IMMUNOLOGY

## 2023-11-24 RX ORDER — POLYETHYLENE GLYCOL 3350, SODIUM SULFATE ANHYDROUS, SODIUM BICARBONATE, SODIUM CHLORIDE, POTASSIUM CHLORIDE 236; 22.74; 6.74; 5.86; 2.97 G/4L; G/4L; G/4L; G/4L; G/4L
4 POWDER, FOR SOLUTION ORAL ONCE
Qty: 1 EACH | Refills: 0 | Status: SHIPPED | OUTPATIENT
Start: 2023-11-24 | End: 2023-11-24

## 2023-11-24 RX ORDER — LEVOCETIRIZINE DIHYDROCHLORIDE 5 MG/1
5 TABLET, FILM COATED ORAL NIGHTLY
Qty: 90 TABLET | Refills: 0 | Status: SHIPPED | OUTPATIENT
Start: 2023-11-24

## 2023-11-24 NOTE — TELEPHONE ENCOUNTER
Please review due to interaction warning with Tramadol    Refill passed per [facility], [facility] protocol    Requested Prescriptions   Pending Prescriptions Disp Refills    SERTRALINE 50 MG Oral Tab [Pharmacy Med Name: Sertraline Hcl 50 Mg Tab Nort] 90 tablet 0     Sig: TAKE ONE TABLET BY MOUTH ONE TIME DAILY       Psychiatric Non-Scheduled (Anti-Anxiety) Passed - 11/24/2023  1:32 AM        Passed - In person appointment or virtual visit in the past 6 mos or appointment in next 3 mos     Recent Outpatient Visits              3 days ago Environmental and seasonal allergies [J30.89]    [address], [address] [facility]    Nurse Only    1 month ago Environmental and seasonal allergies    [facility], [address] [place] [address]    Nurse Only    1 month ago Environmental and seasonal allergies    [facility], [address] [place] [address]    Nurse Only    2 months ago [employer] annual wellness visit, subsequent    [address], [address], Ian Gomez MD    Office Visit    2 months ago Environmental and seasonal allergies    [facility], 148 [facility]    Nurse Only          Future Appointments         Provider Department Appt Notes    In 3 weeks 156 [facility] allergy shots    In 3 months [employer] $49.00 Calc Score       ltr sent                    Future Appointments         Provider Department Appt Notes    In 3 weeks EC ALLERGY [facility], 148 [facility] allergy shots    In 3 months [employer] $49.00 Calc Score       ltr sent            Recent Outpatient Visits              3 days ago Environmental and seasonal allergies [J30.89]    [address], 148 [facility]    Nurse Only    1 month ago Environmental and seasonal allergies 1923 [facility] [place]    Nurse Only    1 month ago Environmental and seasonal allergies    [facility], [address] [place] [address]    Nurse Only    2 months ago [employer] annual wellness visit, subsequent    [employer] [facility]    Office Visit    2 months ago Environmental and seasonal allergies    [place] [email or web address], [address] [place] [address]    Nurse Only

## 2023-11-24 NOTE — TELEPHONE ENCOUNTER
Requested Prescriptions   Pending Prescriptions Disp Refills    LEVOCETIRIZINE 5 MG Oral Tab [Pharmacy Med Name: Levocetirizine Dihydrochloride 5 Mg Tab Teva] 90 tablet 0     Sig: TAKE ONE TABLET BY MOUTH NIGHTLY       Antihistamines Passed - 11/24/2023  1:32 AM        Passed - Appt in past 12 mos or next 1 mos     Recent Outpatient Visits              3 days ago Environmental and seasonal allergies [J30.89]    [facility], [address] [place], [place] [address]    Nurse Only    1 month ago Environmental and seasonal allergies    [facility], [address] [place] [address] [place]    Nurse Only    1 month ago Environmental and seasonal allergies    [facility], [address] [place], [place] [address]    Nurse Only    2 months ago [employer] annual wellness visit, subsequent    [employer], [address], Gregorio Erickson MD    Office Visit    2 months ago Environmental and seasonal allergies    [facility], [address] [place], [place] [address]    Nurse Only          Future Appointments         Provider Department Appt Notes    In 3 weeks 156 [facility], [facility] [place] [facility]    In 3 months [facility] $49.00 Calc Score       ltr sent

## 2023-11-24 NOTE — TELEPHONE ENCOUNTER
Last Procedure, Date, MD:  04/13/2017  Colonoscopy, Dr Beatriz Hoff  Last Diagnosis:  family hx of colon cancer  Recalled (mth/yrs): 7 years  Sedation Used Previously:  MAC  Last Prep Used (if known):  Colyte  Quality Of Prep (if known): good  Anticoagulants: no  Diabetic Med's (PO/Injectables): glimepiride, metformin  Weight loss Med's: no  Iron/Herbal/Multivitamin Supplements (RX/OTC): multivitamin and fish oil  Marijuana/Vaping/CBD:  Height & Weight: 6'1\"/225  BMI: 29.69  Hx of Cardiac/CVA Issues/(MI/Stroke): no  Devices Pacemaker/Defibrillator/Stents: no  Respiratory Issues/Oxygen Use/ALEX/COPD:ALEX/CPAP  Issues w/ Anesthesia: no    Symptoms (Y/N): no  Symptoms Details: N/A    Special Comments/Notes: Medications, allergies and pharmacy verified with the pt. Medicare annual wellness visit with Dr Isa Bee was 09/19/2023    Please advise on orders and prep. Thank you!

## 2023-11-27 RX ORDER — TRAMADOL HYDROCHLORIDE 50 MG/1
TABLET ORAL
Qty: 50 TABLET | Refills: 5 | Status: SHIPPED | OUTPATIENT
Start: 2023-11-27

## 2023-11-27 NOTE — TELEPHONE ENCOUNTER
Please review; protocol failed.    Requested Prescriptions   Pending Prescriptions Disp Refills    TRAMADOL 50 MG Oral Tab [Pharmacy Med Name: Tramadol Hcl 50 Mg Tab Sunp] 50 tablet 0     Sig: TAKE ONE TABLET BY MOUTH EVERY SIX HOURS AS NEEDED FOR PAIN       There is no refill protocol information for this order        Recent Outpatient Visits              6 days ago Environmental and seasonal allergies [J30.89]    [facility], [address] [place], [place] [address]    Nurse Only    1 month ago Environmental and seasonal allergies    [facility], [address] [place], [place] [address] [employer] Only    2 months ago Environmental and seasonal allergies    [facility], [address] [place] [address] [place]    Nurse Only    2 months ago [place] annual wellness visit, subsequent    [address], Raymond Olivas MD    Office Visit    2 months ago Environmental and seasonal allergies    [facility], [address] [place] [address] [place]    Nurse Only          Future Appointments         Provider Department Appt Notes    In 3 weeks [address] [place] allergy shots    In 3 months 250 Alec Clark $49.00 Calc Score       ltr sent

## 2023-12-07 RX ORDER — TAMSULOSIN HYDROCHLORIDE 0.4 MG/1
0.4 CAPSULE ORAL NIGHTLY
Qty: 90 CAPSULE | Refills: 3 | Status: SHIPPED | OUTPATIENT
Start: 2023-12-07

## 2023-12-07 NOTE — TELEPHONE ENCOUNTER
Refill passed per [facility] protocol.     Requested Prescriptions   Pending Prescriptions Disp Refills    TAMSULOSIN 0.4 MG Oral Cap [Pharmacy Med Name: Tamsulosin Hydrochloride 0.4 Mg Cap Nort] 30 capsule 0     Sig: Take 1 capsule (0.4 mg total) by mouth nightly. TAKE AT BEDTIME       Genitourinary Medications Passed - 12/7/2023  1:32 AM        Passed - Patient does not have pulmonary hypertension on problem list        Passed - In person appointment or virtual visit in the past 12 mos or appointment in next 3 mos     Recent Outpatient Visits              2 weeks ago Environmental and seasonal allergies [J30.89]    [facility]    Nurse Only    1 month ago Environmental and seasonal allergies    [facility]    Nurse Only    2 months ago Environmental and seasonal allergies    [facility]    Nurse Only    2 months ago Medicare annual wellness visit, subsequent    [facility] Sincere Greenberg MD    Office Visit    3 months ago Environmental and seasonal allergies    [facility]    Nurse Only          Future Appointments         Provider Department Appt Notes    In 1 week EC ALLERGY [facility] allergy shots    In 2 months [facility] RM1 [facility] [place] $49.00 Calc Score       ltr sent                    [unfilled]      [unfilled]

## 2023-12-11 ENCOUNTER — PATIENT MESSAGE (OUTPATIENT)
Dept: INTERNAL MEDICINE CLINIC | Facility: CLINIC | Age: 68
End: 2023-12-11

## 2023-12-11 RX ORDER — AZITHROMYCIN 250 MG/1
TABLET, FILM COATED ORAL
Qty: 6 TABLET | Refills: 0 | Status: SHIPPED | OUTPATIENT
Start: 2023-12-11 | End: 2023-12-15

## 2023-12-11 NOTE — TELEPHONE ENCOUNTER
Scheduled for:  Colonoscopy [id number]  Provider Name:  Dr Иван Taylor  Date: 4/18/24   Location:  [facility]  Sedation:  Mac  Time: 11:30 (pt is aware that [facility] will call the day before to confirm arrival time)     Prep:  Golytely  Meds/Allergies Reconciled?:  Physician reviewed      Diagnosis with codes:  Family History of colon cancer Z80.0  Was patient informed to call insurance with codes (Y/N):     yes  Referral sent?:  Referral was sent at the time of electronic surgical scheduling. [facility] or [facility] [address] [place] [address] notified?:  I sent an electronic request to Endo Scheduling and received a confirmation today. Medication Orders:      Hold Cialis/Tadalafil or Viagra/sildenafil or Levitra/Vardenafil, Stendra/Stendra medication > 3 days prior to procedure     Hold metformin day of procedure     Hold non-metformin diabetic oral med GLIMEPIRIDE X 2 days prior to procedure (day of prep and day of exam). Misc Orders:       Further instructions given by staff:  I discussed prep instructions with the patient at the time of the appointment which he verbally understood and given the prep instructions at the time of the appointment. Patient was informed about the new cancellation policy for his/her procedure. Patient was also given a copy of the cancellation policy at the time of the appointment and verbalized understanding.

## 2023-12-12 NOTE — TELEPHONE ENCOUNTER
From: Katie Britt. To: Al Ramos  Sent: 12/11/2023 10:29 AM CST  Subject: Sinus Infection    I am experiencing my second sinus infection and was hoping you could prescribe medication for me?   Misti Barbosa

## 2023-12-18 ENCOUNTER — TELEPHONE (OUTPATIENT)
Dept: INTERNAL MEDICINE CLINIC | Facility: CLINIC | Age: 68
End: 2023-12-18

## 2023-12-18 ENCOUNTER — MED REC SCAN ONLY (OUTPATIENT)
Dept: INTERNAL MEDICINE CLINIC | Facility: CLINIC | Age: 68
End: 2023-12-18

## 2023-12-19 ENCOUNTER — NURSE ONLY (OUTPATIENT)
Dept: ALLERGY | Facility: CLINIC | Age: 68
End: 2023-12-19

## 2023-12-19 DIAGNOSIS — J30.89 ENVIRONMENTAL AND SEASONAL ALLERGIES: Primary | ICD-10-CM

## 2023-12-19 PROCEDURE — 95117 IMMUNOTHERAPY INJECTIONS: CPT | Performed by: ALLERGY & IMMUNOLOGY

## 2023-12-28 ENCOUNTER — APPOINTMENT (OUTPATIENT)
Dept: GENERAL RADIOLOGY | Age: 68
End: 2023-12-28
Attending: PHYSICIAN ASSISTANT
Payer: MEDICARE

## 2023-12-28 ENCOUNTER — HOSPITAL ENCOUNTER (OUTPATIENT)
Age: 68
Discharge: HOME | End: 2023-12-28
Payer: MEDICARE

## 2023-12-28 VITALS
SYSTOLIC BLOOD PRESSURE: 164 MMHG | DIASTOLIC BLOOD PRESSURE: 79 MMHG | OXYGEN SATURATION: 97 % | TEMPERATURE: 97 F | HEART RATE: 55 BPM | RESPIRATION RATE: 18 BRPM

## 2023-12-28 DIAGNOSIS — M25.551 RIGHT HIP PAIN: ICD-10-CM

## 2023-12-28 DIAGNOSIS — M54.41 ACUTE RIGHT-SIDED LOW BACK PAIN WITH RIGHT-SIDED SCIATICA: Primary | ICD-10-CM

## 2023-12-28 PROCEDURE — 72100 X-RAY EXAM L-S SPINE 2/3 VWS: CPT | Performed by: PHYSICIAN ASSISTANT

## 2023-12-28 PROCEDURE — 99203 OFFICE O/P NEW LOW 30 MIN: CPT | Performed by: PHYSICIAN ASSISTANT

## 2023-12-28 PROCEDURE — 73502 X-RAY EXAM HIP UNI 2-3 VIEWS: CPT | Performed by: PHYSICIAN ASSISTANT

## 2023-12-28 NOTE — ED INITIAL ASSESSMENT (HPI)
Mechanical fall 12/18, striking back on stairs. Pt c/o bruising to lumbar area, with an area that is hard to the touch. Pt reports pain to area.  Not on blood thinners

## 2024-01-09 RX ORDER — METOPROLOL SUCCINATE 50 MG/1
50 TABLET, EXTENDED RELEASE ORAL DAILY
Qty: 90 TABLET | Refills: 3 | Status: SHIPPED | OUTPATIENT
Start: 2024-01-09

## 2024-01-17 ENCOUNTER — NURSE ONLY (OUTPATIENT)
Dept: ALLERGY | Facility: CLINIC | Age: 69
End: 2024-01-17

## 2024-01-17 DIAGNOSIS — J30.89 ENVIRONMENTAL AND SEASONAL ALLERGIES: Primary | ICD-10-CM

## 2024-01-17 PROCEDURE — 95117 IMMUNOTHERAPY INJECTIONS: CPT | Performed by: ALLERGY & IMMUNOLOGY

## 2024-01-25 RX ORDER — LOSARTAN POTASSIUM AND HYDROCHLOROTHIAZIDE 25; 100 MG/1; MG/1
1 TABLET ORAL DAILY
Qty: 90 TABLET | Refills: 3 | Status: SHIPPED | OUTPATIENT
Start: 2024-01-25

## 2024-01-25 NOTE — TELEPHONE ENCOUNTER
Please review; protocol failed/No Protocol  Requested Prescriptions   Pending Prescriptions Disp Refills    LOSARTAN-HYDROCHLOROTHIAZIDE 100-25 MG Oral Tab [Pharmacy Med Name: Losartan Potassium/Hydrochlorothiazide 100-25 Mg Tab [facility]] 90 tablet 0     Sig: Take 1 tablet by mouth daily.       Hypertensive Medications Protocol Failed - 1/25/2024  1:30 AM        Failed - Last BP reading less than 140/90     BP Readings from Last 1 Encounters:   12/28/23 (!) 164/79               Passed - In person appointment in the past 12 or next 3 months     Recent Outpatient Visits              1 week ago Environmental and seasonal allergies [J30.89]    [facility]    Nurse Only    1 month ago Environmental and seasonal allergies    [facility]    Nurse Only    2 months ago Environmental and seasonal allergies [J30.89]    [facility]    Nurse Only    3 months ago Environmental and seasonal allergies    [facility]    Nurse Only    4 months ago Environmental and seasonal allergies    [facility]    Nurse Only          Future Appointments         Provider Department Appt Notes    In 2 weeks EC ALLERGY [facility]     In 1 month [facility] RM1 [facility] [place] $49.00 Calc Score       ltr sent    In 1 month EC ALLERGY [facility]     In 2 months AYDE REYNOSO [facility] colonoscopy @ [facility]               Passed - CMP or BMP in past 6 months     Recent Results (from the past 4392 hour(s))   Comp Metabolic Panel (14)    Collection Time: 09/19/23  2:40 PM   Result Value Ref Range    Glucose 159 (H) 70 - 99 mg/dL    Sodium 143 136 - 145 mmol/L    Potassium 3.8 3.5 - 5.1 mmol/L     Chloride 106 98 - 112 mmol/L    CO2 32.0 21.0 - 32.0 mmol/L    Anion Gap 5 0 - 18 mmol/L    BUN 19 (H) 7 - 18 mg/dL    Creatinine 0.94 0.70 - 1.30 mg/dL    BUN/CREA Ratio 20.2 (H) 10.0 - 20.0    Calcium, Total 9.5 8.5 - 10.1 mg/dL    Calculated Osmolality 302 (H) 275 - 295 mOsm/kg    eGFR-Cr 88 >=60 mL/min/1.73m2    ALT 38 16 - 61 U/L    AST 22 15 - 37 U/L    Alkaline Phosphatase 53 45 - 117 U/L    Bilirubin, Total 0.6 0.1 - 2.0 mg/dL    Total Protein 7.7 6.4 - 8.2 g/dL    Albumin 4.5 3.4 - 5.0 g/dL    Globulin  3.2 2.8 - 4.4 g/dL    A/G Ratio 1.4 1.0 - 2.0    Patient Fasting for CMP? Yes      *Note: Due to a large number of results and/or encounters for the requested time period, some results have not been displayed. A complete set of results can be found in Results Review.               Passed - In person appointment or virtual visit in the past 6 months     Recent Outpatient Visits              1 week ago Environmental and seasonal allergies [J30.89]    [facility]    Nurse Only    1 month ago Environmental and seasonal allergies    [facility]    Nurse Only    2 months ago Environmental and seasonal allergies [J30.89]    [facility]    Nurse Only    3 months ago Environmental and seasonal allergies    [facility]    Nurse Only    4 months ago Environmental and seasonal allergies    [facility]    Nurse Only          Future Appointments         Provider Department Appt Notes    In 2 weeks EC ALLERGY [facility]     In 1 month [facility] CT RM1 [facility] [place] $49.00 Calc Score       ltr sent    In 1 month EC ALLERGY [facility]     In 2 months [facility] colonoscopy @ [facility]               Passed - EGFRCR or GFRNAA > 50     GFR Evaluation  EGFRCR: 88 , resulted on 9/19/2023             Future Appointments         Provider Department Appt Notes    In 2 weeks EC ALLERGY [facility]     In 1 month [facility] [address] [place] $49.00 Calc Score       ltr sent    In 1 month EC ALLERGY [facility]     In 2 months EDGARDO, PROCEDURE [facility] colonoscopy @ [facility]          Recent Outpatient Visits              1 week ago Environmental and seasonal allergies [J30.89]    [facility]    Nurse Only    1 month ago Environmental and seasonal allergies    [facility]    Nurse Only    2 months ago Environmental and seasonal allergies [J30.89]    [facility]    Nurse Only    3 months ago Environmental and seasonal allergies    [facility]    Nurse Only    4 months ago Environmental and seasonal allergies    [facility]    Nurse Only

## 2024-02-06 RX ORDER — MONTELUKAST SODIUM 10 MG/1
10 TABLET ORAL NIGHTLY
Qty: 90 TABLET | Refills: 3 | Status: SHIPPED | OUTPATIENT
Start: 2024-02-06

## 2024-02-06 NOTE — TELEPHONE ENCOUNTER
Refill passed per [facility] protocol    Requested Prescriptions   Pending Prescriptions Disp Refills    MONTELUKAST 10 MG Oral Tab [Pharmacy Med Name: Montelukast Sodium 10 Mg Tab Auro] 90 tablet 0     Sig: TAKE ONE TABLET BY MOUTH NIGHTLY             Future Appointments         Provider Department Appt Notes    In 1 week EC ALLERGY [facility]     In 3 weeks L[facility] CT 1 [facility] [place] $49.00 Calc Score       ltr sent    In 1 month EC ALLERGY [facility]     In 2 months EDGARDO, PROCEDURE [facility] colonoscopy @ [facility]            Recent Outpatient Visits              2 weeks ago Environmental and seasonal allergies [J30.89]    [facility]    Nurse Only    1 month ago Environmental and seasonal allergies    [facility]    Nurse Only    2 months ago Environmental and seasonal allergies [J30.89]    [facility]    Nurse Only    3 months ago Environmental and seasonal allergies    [facility]    Nurse Only    4 months ago Environmental and seasonal allergies    [facility]    Nurse Only

## 2024-02-13 ENCOUNTER — TELEMEDICINE (OUTPATIENT)
Dept: INTERNAL MEDICINE CLINIC | Facility: CLINIC | Age: 69
End: 2024-02-13
Payer: MEDICARE

## 2024-02-13 ENCOUNTER — PATIENT MESSAGE (OUTPATIENT)
Dept: INTERNAL MEDICINE CLINIC | Facility: CLINIC | Age: 69
End: 2024-02-13

## 2024-02-13 ENCOUNTER — NURSE ONLY (OUTPATIENT)
Dept: ALLERGY | Facility: CLINIC | Age: 69
End: 2024-02-13

## 2024-02-13 DIAGNOSIS — E11.9 DIABETES MELLITUS WITHOUT COMPLICATION (HCC): Primary | ICD-10-CM

## 2024-02-13 DIAGNOSIS — J30.89 ENVIRONMENTAL AND SEASONAL ALLERGIES: Primary | ICD-10-CM

## 2024-02-13 PROCEDURE — 99214 OFFICE O/P EST MOD 30 MIN: CPT | Performed by: INTERNAL MEDICINE

## 2024-02-13 PROCEDURE — 95165 ANTIGEN THERAPY SERVICES: CPT | Performed by: ALLERGY & IMMUNOLOGY

## 2024-02-13 PROCEDURE — 95117 IMMUNOTHERAPY INJECTIONS: CPT | Performed by: ALLERGY & IMMUNOLOGY

## 2024-02-13 RX ORDER — BLOOD SUGAR DIAGNOSTIC
STRIP MISCELLANEOUS
Qty: 100 STRIP | Refills: 11 | Status: SHIPPED | OUTPATIENT
Start: 2024-02-13 | End: 2024-02-15

## 2024-02-13 RX ORDER — LANCETS
EACH MISCELLANEOUS
Qty: 100 EACH | Refills: 11 | Status: SHIPPED | OUTPATIENT
Start: 2024-02-13

## 2024-02-13 NOTE — PROGRESS NOTES
HPI:    Patient ID: Jose Simon Jr. is a 68 year old male.    HPI  Jose Simon Jr. verbally consents to a telemedicine service with live, interactive video and audio on 2/13/2024.  Patient understands and accepts financial responsibility for any deductible, co-insurance and/or co-pays associated with this service.   Patient had an insurance physical the other day , had labs done , glucose was 210 , 14 hours fasting ( ? If Glycogenolysis due to prolonged fast).   And hgba1c was 7.7 which includes the holidays .   Patient is not as physically active and is not checking glucose routinely at home, needs strips and Lancets.  His microalb was slightly over 100  he is on Losartan already . .     Review of Systems   Constitutional: Negative.    HENT: Negative.     Eyes: Negative.    Respiratory: Negative.     Cardiovascular:  Negative for chest pain and leg swelling.   Gastrointestinal: Negative.    Endocrine: Negative.    Genitourinary:  Negative for decreased urine volume, difficulty urinating, dysuria, flank pain, frequency, hematuria and urgency.   Musculoskeletal:  Negative for arthralgias, back pain, gait problem, joint swelling and myalgias.   Skin:  Negative for color change, rash and wound.   Allergic/Immunologic: Negative.    Neurological: Negative.  Negative for headaches.   Psychiatric/Behavioral:  Negative for agitation and confusion. The patient is not nervous/anxious.             Current Outpatient Medications   Medication Sig Dispense Refill    montelukast 10 MG Oral Tab Take 1 tablet (10 mg total) by mouth nightly. 90 tablet 3    losartan-hydroCHLOROthiazide 100-25 MG Oral Tab Take 1 tablet by mouth daily. 90 tablet 3    metoprolol succinate ER 50 MG Oral Tablet 24 Hr Take 1 tablet (50 mg total) by mouth daily. 90 tablet 3    tamsulosin 0.4 MG Oral Cap Take 1 capsule (0.4 mg total) by mouth nightly. TAKE AT BEDTIME 90 capsule 3    sertraline 50 MG Oral Tab Take 1 tablet (50 mg total) by mouth daily.  90 tablet 3    traMADol 50 MG Oral Tab TAKE ONE TABLET BY MOUTH EVERY SIX HOURS AS NEEDED FOR PAIN 50 tablet 5    LEVOCETIRIZINE 5 MG Oral Tab TAKE ONE TABLET BY MOUTH NIGHTLY 90 tablet 0    albuterol (PROAIR HFA) 108 (90 Base) MCG/ACT Inhalation Aero Soln Inhale 2 puffs into the lungs every 4 to 6 hours as needed for Wheezing. 3 each 3    pantoprazole 40 MG Oral Tab EC Take 1 tablet (40 mg total) by mouth before breakfast. 90 tablet 3    zolpidem 5 MG Oral Tab Take 1 tablet (5 mg total) by mouth nightly as needed for Sleep. 20 tablet 0    metFORMIN  MG Oral Tablet 24 Hr Take 4 tablets (2,000 mg total) by mouth every evening. 360 tablet 3    glimepiride 4 MG Oral Tab TAKE ONE TABLET BY MOUTH in the morning and take one tablet by mouth in the evening 180 tablet 2    ATORVASTATIN 40 MG Oral Tab Take 1 tablet (40 mg total) by mouth nightly. 90 tablet 3    SUMAtriptan Succinate 100 MG Oral Tab Take 1 tab as needed for headache, may repeat in 2 hours, maximum 2 tabs in 24 hours. 9 tablet 2    ondansetron (ZOFRAN) 4 mg tablet Take 1 tablet (4 mg total) by mouth every 8 (eight) hours as needed for Nausea. 10 tablet 3    pseudoephedrine  MG Oral Tablet 12 Hr Take 1 tablet (120 mg total) by mouth every 12 (twelve) hours. 60 tablet 3    Glucose Blood (ONETOUCH ULTRA) In Vitro Strip Test blood sugar once a day 100 strip 11    OneTouch UltraSoft Lancets Does not apply Misc Test blood sugar once a day 100 each 11    LORATADINE 10 MG Oral Tab TAKE ONE TABLET BY MOUTH ONE TIME DAILY  (Patient taking differently: No sig reported) 90 tablet 2    aspirin 81 MG Oral Tab Take 1 tablet (81 mg total) by mouth daily.      Multiple Vitamins-Minerals (MULTIVITAMIN ADULT OR) Take by mouth.      Glucose Blood (SEPIDEH CONTOUR TEST) In Vitro Strip Test twice a day 100 each 11    SEPIDEH MICROLET LANCETS Does not apply Misc Test twice a day 100 each 11    ALPRAZolam 0.5 MG Oral Tab Take 1 tablet (0.5 mg total) by mouth daily.       Omega-3 Fatty Acids (OMEGA-3 FISH OIL) 1200 MG Oral Cap Take  by mouth.       Allergies:  Allergies   Allergen Reactions    Cats Claw, Uncaria Tomentosa ITCHING     Dander     Mold Runny nose    Ragweed ITCHING      PHYSICAL EXAM:   There were no vitals taken for this visit.     Physical Exam  Constitutional:       Appearance: Normal appearance.   Pulmonary:      Effort: Pulmonary effort is normal.   Neurological:      Mental Status: He is alert.   Psychiatric:         Mood and Affect: Mood normal.         Behavior: Behavior normal.         Thought Content: Thought content normal.         Judgment: Judgment normal.                ASSESSMENT/PLAN:   Diabetes mellitus without complication (HCC)  Hgba1c was 7.7 on insurance physical and fasting glucose was 210 ,   Patient was not as careful with diet over the holidays and has not been as active.   Will increase activity , check glucose , watch diet and recheck progress in 2 weeks.    He is on Metformin and Glimeperide for glucose now.     No orders of the defined types were placed in this encounter.      Meds This Visit:  Requested Prescriptions      No prescriptions requested or ordered in this encounter       Imaging & Referrals:  None       ID#[id number]

## 2024-02-13 NOTE — ASSESSMENT & PLAN NOTE
Hgba1c was 7.7 on insurance physical and fasting glucose was 210 ,   Patient was not as careful with diet over the holidays and has not been as active.   Will increase activity , check glucose , watch diet and recheck progress in 2 weeks.    He is on Metformin and Glimeperide for glucose now.

## 2024-02-14 RX ORDER — LANCETS
EACH MISCELLANEOUS
Qty: 100 EACH | Refills: 3 | Status: SHIPPED | OUTPATIENT
Start: 2024-02-14

## 2024-02-14 RX ORDER — BLOOD SUGAR DIAGNOSTIC
1 STRIP MISCELLANEOUS DAILY
Qty: 100 STRIP | Refills: 3 | Status: SHIPPED | OUTPATIENT
Start: 2024-02-14

## 2024-02-14 NOTE — PROGRESS NOTES
Pt seen at [facility], [facility]:  PRELIMINARY SCORE= 124.0  BP= 120/80  [facility] labs as follows: from 4/26/19  TC= 173  HDL= 41  LDL= 108  TG= 120  GLUCOSE= 109 (Type II DM)  All results and risk factors discussed with patient; all questions and constanza Patient is a 62y old  Male who presents with a chief complaint of TAVR eval (10 Feb 2024 11:21)    HPI:  62 year old male with Hx of HTN, GERD, dyslipidemia, chronic intermittent leg pain, aortic stenosis, chronic venous stasis dermatitis with bilateral leg swelling, poor historian presented to [place] [facility] on 1/25/2024 for bilateral leg pain and swelling. He was admitted to medicine and was found to have new onset acute HFrEF (EF 35-40% 1/2024) in setting of severe AS. Echo results below from 1/26/2024.  He was treated with IV lasix, beta blocker. Not a candidate for ACE/ARB/ARNI due to renal function. Also was treated with 5 days keflex for possible lower extremity cellulitis. While admitted patient found to be anemic. FOBT negative. Heme was consulted, advised multifactoral etiology for anemia, mechanical hemolysis due to severe AS also possible. Patient was given IV iron, as well as 1 unit PRBCs on 1/31 with improvement in Hbg. GI was also consulted.  Patient will require ischemic workup with coronary angiogram and full structural heart team evaluation for TAVR - accepted for transfer at [facility] by hospitalist Dr Joiner in consult with Dr Catalan (interventional cardiology).  Walks with cane, Lives with wife- poor memory   denies implanted cardiac monitors     Summary: ECHO 1/26/2024    1. Left ventricular ejection fraction, by visual estimation, is 35 to   40%.   2. Moderately decreased global left ventricular systolic function.   3. Basal inferior segment, mid inferolateral segment, mid anterolateral   segment, and mid inferior segment are abnormal as described above.   4. Severely enlarged left atrium.   5. Increased LV wall thickness.   6. Mildly increased left ventricular internal cavity size.   7. Spectral Doppler shows restrictivepattern of left ventricular   myocardial filling (Grade III diastolic dysfunction).   8. There is mild concentric left ventricular hypertrophy.   9. Moderately enlarged right ventricle.  10. Right atrial enlargement.  11. Trivial pericardial effusion.  12. Moderate mitral valve regurgitation.  13. Thickening of the anterior and posterior mitral valve leaflets.  14. Mild-moderate tricuspid regurgitation.  15. Mild aortic regurgitation.  16. Severe aortic valve stenosis.  17. Dilatation of the ascending aorta.  18. Estimated pulmonary artery systolic pressure is 58.6 mmHg assuming a   right atrial pressure of 15 mmHg, which is consistent with moderate   pulmonary hypertension.  19. LA volume Index is 56.0 ml/m² ml/m2.  20. The Dimesionless Index value is .17.  [id number] Micah Nguyễn , Electronically signed on 1/26/2024 at   2:58:40 PM     (05 Feb 2024 16:00)       REVIEW OF SYSTEMS  Constitutional: No fevers, chills, weight loss or fatigue   Skin: No rash, no phlebitis	  Eyes: No discharge	  ENMT: No sore throat, oral thrush, ulcers or exudate  Respiratory: No cough, no SOB  Cardiovascular:  No chest pain, palpitations or edema   Gastrointestinal: No pain, nausea, vomiting, diarrhea or constipation	  Genitourinary: No dysuria, discharge or flank pain  MSK: No arthralgias or back pain   Neurological: No HA, no weakness, no seizures, no AMS       prior hospital charts reviewed [V]  primary team notes reviewed [V]  other consultant notes reviewed [V]    PAST MEDICAL & SURGICAL HISTORY:  Dyslipidemia      Hypertension      Chronic GERD      History of aortic stenosis      Cellulitis      No significant past surgical history          SOCIAL HISTORY:  - Denied smoking/vaping/alcohol/recreational drug use    FAMILY HISTORY:  FH: congestive heart failure (Father, Sibling)    FH: coronary artery disease (Sibling)    FH: brain cancer (Mother)        Allergies  garlic (Angioedema; Swelling; Anaphylaxis)  No Known Drug Allergies        ANTIMICROBIALS:      ANTIMICROBIALS (past 90 days):  MEDICATIONS  (STANDING):        OTHER MEDS:   MEDICATIONS  (STANDING):  acetaminophen     Tablet .. 650 every 6 hours PRN  epoetin loulou-epbx (RETACRIT) Injectable 31475 every 7 days  furosemide   Injectable 40 two times a day  losartan 25 daily  metoprolol succinate ER 50 daily  oxycodone    5 mG/acetaminophen 325 mG 1 every 6 hours PRN  polyethylene glycol 3350 17 daily PRN  spironolactone 25 daily  tamsulosin 0.4 at bedtime      VITALS:  Vital Signs Last 24 Hrs  T(F): 97.9 (02-14-24 @ 12:11), Max: 99 (02-12-24 @ 20:08)    Vital Signs Last 24 Hrs  HR: 69 (02-14-24 @ 12:11) (69 - 87)  BP: 104/68 (02-14-24 @ 12:11) (104/68 - 118/70)  RR: 18 (02-14-24 @ 12:11)  SpO2: 97% (02-14-24 @ 12:11) (95% - 100%)  Wt(kg): --    EXAM:  General: Patient in no acute distress   HEENT: NCAT, EOMI, PERRL, no oral lesions  CV: S1+S2, no m/r/g appreciated   Lungs: No respiratory distress, CTAB  Abd: Soft, nontender, no guarding, no rebound tenderness, + bowel sounds   Ext: No cyanosis, no edema  Neuro: Alert and oriented, no focal deficits, CN II-XII grossly intact   Skin: No rash   IV: No phlebitis      Labs:                        8.3    8.62  )-----------( 219      ( 14 Feb 2024 07:13 )             27.5     02-14    139  |  100  |  46<H>  ----------------------------<  130<H>  4.1   |  27  |  1.32<H>    Ca    8.7      14 Feb 2024 07:28  Phos  2.4     02-14  Mg     1.9     02-14    TPro  6.0  /  Alb  3.4  /  TBili  1.6<H>  /  DBili  x   /  AST  23  /  ALT  68<H>  /  AlkPhos  67  02-14      WBC Trend:  WBC Count: 8.62 (02-14-24 @ 07:13)  WBC Count: 9.83 (02-13-24 @ 07:06)  WBC Count: 9.02 (02-12-24 @ 04:39)  WBC Count: 9.18 (02-11-24 @ 07:28)      Auto Neutrophil #: 9.24 K/uL (02-06-24 @ 05:23)  Auto Neutrophil #: 8.48 K/uL (01-31-24 @ 07:19)  Auto Neutrophil #: 6.95 K/uL (01-26-24 @ 06:45)  Auto Neutrophil #: 13.12 K/uL (01-25-24 @ 16:50)  Auto Neutrophil #: 6.25 K/uL (01-07-24 @ 10:09)      Creatine Trend:  Creatinine: 1.32 (02-14)  Creatinine: 1.38 (02-13)  Creatinine: 1.33 (02-13)  Creatinine: 1.33 (02-12)      Liver Biochemical Testing Trend:  Alanine Aminotransferase (ALT/SGPT): 68 *H* (02-14)  Alanine Aminotransferase (ALT/SGPT): 85 *H* (02-13)  Alanine Aminotransferase (ALT/SGPT): 100 *H* (02-12)  Alanine Aminotransferase (ALT/SGPT): 120 *H* (02-11)  Alanine Aminotransferase (ALT/SGPT): 142 *H* (02-10)  Aspartate Aminotransferase (AST/SGOT): 23 (02-14-24 @ 07:28)  Aspartate Aminotransferase (AST/SGOT): 29 (02-13-24 @ 07:06)  Aspartate Aminotransferase (AST/SGOT): 27 (02-12-24 @ 04:39)  Aspartate Aminotransferase (AST/SGOT): 30 (02-11-24 @ 07:31)  Aspartate Aminotransferase (AST/SGOT): 32 (02-10-24 @ 06:28)  Bilirubin Total: 1.6 (02-14)  Bilirubin Total: 1.9 (02-13)  Bilirubin Total: 2.2 (02-12)  Bilirubin Total: 2.4 (02-11)  Bilirubin Total: 2.3 (02-10)      Trend LDH  01-31-24 @ 07:19  300<H>    Urinalysis Basic - ( 14 Feb 2024 07:28 )    Color: x / Appearance: x / SG: x / pH: x  Gluc: 130 mg/dL / Ketone: x  / Bili: x / Urobili: x   Blood: x / Protein: x / Nitrite: x   Leuk Esterase: x / RBC: x / WBC x   Sq Epi: x / Non Sq Epi: x / Bacteria: x      MICROBIOLOGY:    MRSA PCR Result.: NotDetec (02-07-24 @ 22:42)  MRSA PCR Result.: NotDetec (01-26-24 @ 02:00)      Culture - Blood (collected 25 Jan 2024 16:55)  Source: .Blood Blood-Peripheral  Final Report:    No growth at 5 days    Culture - Blood (collected 25 Jan 2024 16:50)  Source: .Blood Blood-Peripheral  Final Report:    No growth at 5 days    Culture - Other (collected 07 Jan 2024 10:09)  Source: Drainage Sp. Instructions_Additional Info: R leg wound  Final Report:    Numerous Serratia marcescens    Rare Streptococcus dysgalactiae (Group C/G) "Susceptibilities not    performed"    Normal skin nancy isolated  Organism: Serratia marcescens  Organism: Serratia marcescens    Sensitivities:      Method Type: PARRIS      -  Amoxicillin/Clavulanic Acid: R >16/8      -  Ampicillin: R 16 These ampicillin results predict results for amoxicillin      -  Ampicillin/Sulbactam: R 16/8      -  Aztreonam: S <=4      -  Cefazolin: R >16      -  Cefepime: S <=2      -  Cefoxitin: R <=8      -  Ceftriaxone: S <=1      -  Ciprofloxacin: S <=0.25      -  Ertapenem: S <=0.5      -  Gentamicin: S <=2      -  Levofloxacin: S <=0.5      -  Meropenem: S <=1      -  Piperacillin/Tazobactam: S <=8      -  Tobramycin: S <=2      -  Trimethoprim/Sulfamethoxazole: S <=0.5/9.5    A1C with Estimated Average Glucose Result: 6.2 % (01-27-24 @ 09:00)      RADIOLOGY:  imaging below personally reviewed    < from: CT Angio Coronary TAVR OCTAVIA w/ IV Cont (02.08.24 @ 12:41) >  IMPRESSION:    1. Severelycalcified (Agatston calcium score 5251) trileaflet aortic   valve.    2. Aortic and peripheral access vessel measurements as reported above.    3. Markedly enlarged heart. Correlate with echocardiogram. Evidence of   third spacing with chest/abdominal wall soft tissue edema and small   ascites.    4. Findings suggestive of mid/distal esophagitis. Correlate clinically.    5. Indeterminate right adrenal nodule measures 14 mm. Correlate with   abdominal MRI for characterization.    6. [id number] mm upper anterior subcutaneous dermal lesion, image   103 series 15, incompletely evaluated. Again, epidermal inclusion cyst is   in the differential. Recommend dermatology evaluation.    < end of copied text > Patient is a 62y old  Male who presents with a chief complaint of TAVR eval (10 Feb 2024 11:21)    HPI:  62 year old male with Hx of HTN, GERD, dyslipidemia, chronic intermittent leg pain, aortic stenosis, chronic venous stasis dermatitis with bilateral leg swelling, poor historian presented to [place] ER on 1/25/2024 for bilateral leg pain and swelling. He was admitted to medicine and was found to have new onset acute HFrEF (EF 35-40% 1/2024) in setting of severe AS. Echo results below from 1/26/2024.  He was treated with IV lasix, beta blocker. Not a candidate for ACE/ARB/ARNI due to renal function. Also was treated with 5 days keflex for possible lower extremity cellulitis. While admitted patient found to be anemic. FOBT negative. Heme was consulted, advised multifactoral etiology for anemia, mechanical hemolysis due to severe AS also possible. Patient was given IV iron, as well as 1 unit PRBCs on 1/31 with improvement in Hbg. GI was also consulted.  Patient will require ischemic workup with coronary angiogram and full structural heart team evaluation for TAVR - accepted for transfer at [facility] by hospitalist Dr Joiner in consult with Dr Catalan (interventional cardiology).  Walks with cane, Lives with wife- poor memory   denies implanted cardiac monitors     Summary: ECHO 1/26/2024    1. Left ventricular ejection fraction, by visual estimation, is 35 to   40%.   2. Moderately decreased global left ventricular systolic function.   3. Basal inferior segment, mid inferolateral segment, mid anterolateral   segment, and mid inferior segment are abnormal as described above.   4. Severely enlarged left atrium.   5. Increased LV wall thickness.   6. Mildly increased left ventricular internal cavity size.   7. Spectral Doppler shows restrictivepattern of left ventricular   myocardial filling (Grade III diastolic dysfunction).   8. There is mild concentric left ventricular hypertrophy.   9. Moderately enlarged right ventricle.  10. Right atrial enlargement.  11. Trivial pericardial effusion.  12. Moderate mitral valve regurgitation.  13. Thickening of the anterior and posterior mitral valve leaflets.  14. Mild-moderate tricuspid regurgitation.  15. Mild aortic regurgitation.  16. Severe aortic valve stenosis.  17. Dilatation of the ascending aorta.  18. Estimated pulmonary artery systolic pressure is 58.6 mmHg assuming a   right atrial pressure of 15 mmHg, which is consistent with moderate   pulmonary hypertension.  19. LA volume Index is 56.0 ml/m² ml/m2.  20. The Dimesionless Index value is .17.  [id number] Micah Nguyễn , Electronically signed on 1/26/2024 at   2:58:40 PM     (05 Feb 2024 16:00)       REVIEW OF SYSTEMS  Constitutional: No fevers, chills, weight loss or fatigue   Skin: No rash, no phlebitis	  Eyes: No discharge	  ENMT: No sore throat, oral thrush, ulcers or exudate  Respiratory: No cough, no SOB  Cardiovascular:  No chest pain, palpitations or edema   Gastrointestinal: No pain, nausea, vomiting, diarrhea or constipation	  Genitourinary: No dysuria, discharge or flank pain  MSK: No arthralgias or back pain   Neurological: No HA, no weakness, no seizures, no AMS       prior hospital charts reviewed [V]  primary team notes reviewed [V]  other consultant notes reviewed [V]    PAST MEDICAL & SURGICAL HISTORY:  Dyslipidemia      Hypertension      Chronic GERD      History of aortic stenosis      Cellulitis      No significant past surgical history      SOCIAL HISTORY:  - Denied smoking/vaping/alcohol/recreational drug use    FAMILY HISTORY:  FH: congestive heart failure (Father, Sibling)    FH: coronary artery disease (Sibling)    FH: brain cancer (Mother)        Allergies  garlic (Angioedema; Swelling; Anaphylaxis)  No Known Drug Allergies        ANTIMICROBIALS:      ANTIMICROBIALS (past 90 days):  MEDICATIONS  (STANDING):        OTHER MEDS:   MEDICATIONS  (STANDING):  acetaminophen     Tablet .. 650 every 6 hours PRN  epoetin loulou-epbx (RETACRIT) Injectable 42253 every 7 days  furosemide   Injectable 40 two times a day  losartan 25 daily  metoprolol succinate ER 50 daily  oxycodone    5 mG/acetaminophen 325 mG 1 every 6 hours PRN  polyethylene glycol 3350 17 daily PRN  spironolactone 25 daily  tamsulosin 0.4 at bedtime      VITALS:  Vital Signs Last 24 Hrs  T(F): 97.9 (02-14-24 @ 12:11), Max: 99 (02-12-24 @ 20:08)    Vital Signs Last 24 Hrs  HR: 69 (02-14-24 @ 12:11) (69 - 87)  BP: 104/68 (02-14-24 @ 12:11) (104/68 - 118/70)  RR: 18 (02-14-24 @ 12:11)  SpO2: 97% (02-14-24 @ 12:11) (95% - 100%)  Wt(kg): --    EXAM:  General: Patient in no acute distress  HEENT: NCAT, EOMI, PERRL, no oral lesions  CV: S1+S2, no m/r/g appreciated   Lungs: No respiratory distress, CTAB  Abd: Soft, nontender, no guarding, no rebound tenderness, + bowel sounds   Ext: No cyanosis, no edema  Neuro: Alert and oriented, no focal deficits, CN II-XII grossly intact   Skin: BLE swelling. Anterior shin RLE with ulcer with scant drainage with surrounding erythema, minimal warmth. Posterior shin with ulcer with surrounding erythema, minimal warmth. LLE with ulcer with surrounding erythema, minimal warmth.  IV: No phlebitis      Labs:                        8.3    8.62  )-----------( 219      ( 14 Feb 2024 07:13 )             27.5     02-14    139  |  100  |  46<H>  ----------------------------<  130<H>  4.1   |  27  |  1.32<H>    Ca    8.7      14 Feb 2024 07:28  Phos  2.4     02-14  Mg     1.9     02-14    TPro  6.0  /  Alb  3.4  /  TBili  1.6<H>  /  DBili  x   /  AST  23  /  ALT  68<H>  /  AlkPhos  67  02-14      WBC Trend:  WBC Count: 8.62 (02-14-24 @ 07:13)  WBC Count: 9.83 (02-13-24 @ 07:06)  WBC Count: 9.02 (02-12-24 @ 04:39)  WBC Count: 9.18 (02-11-24 @ 07:28)      Auto Neutrophil #: 9.24 K/uL (02-06-24 @ 05:23)  Auto Neutrophil #: 8.48 K/uL (01-31-24 @ 07:19)  Auto Neutrophil #: 6.95 K/uL (01-26-24 @ 06:45)  Auto Neutrophil #: 13.12 K/uL (01-25-24 @ 16:50)  Auto Neutrophil #: 6.25 K/uL (01-07-24 @ 10:09)      Creatine Trend:  Creatinine: 1.32 (02-14)  Creatinine: 1.38 (02-13)  Creatinine: 1.33 (02-13)  Creatinine: 1.33 (02-12)      Liver Biochemical Testing Trend:  Alanine Aminotransferase (ALT/SGPT): 68 *H* (02-14)  Alanine Aminotransferase (ALT/SGPT): 85 *H* (02-13)  Alanine Aminotransferase (ALT/SGPT): 100 *H* (02-12)  Alanine Aminotransferase (ALT/SGPT): 120 *H* (02-11)  Alanine Aminotransferase (ALT/SGPT): 142 *H* (02-10)  Aspartate Aminotransferase (AST/SGOT): 23 (02-14-24 @ 07:28)  Aspartate Aminotransferase (AST/SGOT): 29 (02-13-24 @ 07:06)  Aspartate Aminotransferase (AST/SGOT): 27 (02-12-24 @ 04:39)  Aspartate Aminotransferase (AST/SGOT): 30 (02-11-24 @ 07:31)  Aspartate Aminotransferase (AST/SGOT): 32 (02-10-24 @ 06:28)  Bilirubin Total: 1.6 (02-14)  Bilirubin Total: 1.9 (02-13)  Bilirubin Total: 2.2 (02-12)  Bilirubin Total: 2.4 (02-11)  Bilirubin Total: 2.3 (02-10)      Trend LDH  01-31-24 @ 07:19  300<H>    Urinalysis Basic - ( 14 Feb 2024 07:28 )    Color: x / Appearance: x / SG: x / pH: x  Gluc: 130 mg/dL / Ketone: x  / Bili: x / Urobili: x   Blood: x / Protein: x / Nitrite: x   Leuk Esterase: x / RBC: x / WBC x   Sq Epi: x / Non Sq Epi: x / Bacteria: x      MICROBIOLOGY:    MRSA PCR Result.: NotDetec (02-07-24 @ 22:42)  MRSA PCR Result.: NotDetec (01-26-24 @ 02:00)      Culture - Blood (collected 25 Jan 2024 16:55)  Source: .Blood Blood-Peripheral  Final Report:    No growth at 5 days    Culture - Blood (collected 25 Jan 2024 16:50)  Source: .Blood Blood-Peripheral  Final Report:    No growth at 5 days    Culture - Other (collected 07 Jan 2024 10:09)  Source: Drainage Sp. Instructions_Additional Info: R leg wound  Final Report:    Numerous Serratia marcescens    Rare Streptococcus dysgalactiae (Group C/G) "Susceptibilities not    performed"    Normal skin nancy isolated  Organism: Serratia marcescens  Organism: Serratia marcescens    Sensitivities:      Method Type: PARRIS      -  Amoxicillin/Clavulanic Acid: R >16/8      -  Ampicillin: R 16 These ampicillin results predict results for amoxicillin      -  Ampicillin/Sulbactam: R 16/8      -  Aztreonam: S <=4      -  Cefazolin: R >16      -  Cefepime: S <=2      -  Cefoxitin: R <=8      -  Ceftriaxone: S <=1      -  Ciprofloxacin: S <=0.25      -  Ertapenem: S <=0.5      -  Gentamicin: S <=2      -  Levofloxacin: S <=0.5      -  Meropenem: S <=1      -  Piperacillin/Tazobactam: S <=8      -  Tobramycin: S <=2      -  Trimethoprim/Sulfamethoxazole: S <=0.5/9.5    A1C with Estimated Average Glucose Result: 6.2 % (01-27-24 @ 09:00)      RADIOLOGY:  imaging below personally reviewed    < from: CT Angio Coronary TAVR OCTAVIA w/ IV Cont (02.08.24 @ 12:41) >  IMPRESSION:    1. Severelycalcified (Agatston calcium score 5251) trileaflet aortic   valve.    2. Aortic and peripheral access vessel measurements as reported above.    3. Markedly enlarged heart. Correlate with echocardiogram. Evidence of   third spacing with chest/abdominal wall soft tissue edema and small   ascites.    4. Findings suggestive of mid/distal esophagitis. Correlate clinically.    5. Indeterminate right adrenal nodule measures 14 mm. Correlate with   abdominal MRI for characterization.    6. [id number] mm upper anterior subcutaneous dermal lesion, image   103 series 15, incompletely evaluated. Again, epidermal inclusion cyst is   in the differential. Recommend dermatology evaluation.    < end of copied text > Patient is a 62y old  Male who presents with a chief complaint of TAVR eval (10 Feb 2024 11:21)    HPI:  62 year old male with Hx of HTN, GERD, dyslipidemia, chronic intermittent leg pain, aortic stenosis, chronic venous stasis dermatitis with bilateral leg swelling, poor historian presented to [place] ER on 1/25/2024 for bilateral leg pain and swelling. He was admitted to medicine and was found to have new onset acute HFrEF (EF 35-40% 1/2024) in setting of severe AS. Echo results below from 1/26/2024.  He was treated with IV lasix, beta blocker. Not a candidate for ACE/ARB/ARNI due to renal function. Also was treated with 5 days keflex for possible lower extremity cellulitis. While admitted patient found to be anemic. FOBT negative. Heme was consulted, advised multifactoral etiology for anemia, mechanical hemolysis due to severe AS also possible. Patient was given IV iron, as well as 1 unit PRBCs on 1/31 with improvement in Hbg. GI was also consulted.  Patient will require ischemic workup with coronary angiogram and full structural heart team evaluation for TAVR - accepted for transfer at [facility] by hospitalist Dr Joiner in consult with Dr Catalan (interventional cardiology).  Walks with cane, Lives with wife- poor memory   denies implanted cardiac monitors     Summary: ECHO 1/26/2024    1. Left ventricular ejection fraction, by visual estimation, is 35 to   40%.   2. Moderately decreased global left ventricular systolic function.   3. Basal inferior segment, mid inferolateral segment, mid anterolateral   segment, and mid inferior segment are abnormal as described above.   4. Severely enlarged left atrium.   5. Increased LV wall thickness.   6. Mildly increased left ventricular internal cavity size.   7. Spectral Doppler shows restrictivepattern of left ventricular   myocardial filling (Grade III diastolic dysfunction).   8. There is mild concentric left ventricular hypertrophy.   9. Moderately enlarged right ventricle.  10. Right atrial enlargement.  11. Trivial pericardial effusion.  12. Moderate mitral valve regurgitation.  13. Thickening of the anterior and posterior mitral valve leaflets.  14. Mild-moderate tricuspid regurgitation.  15. Mild aortic regurgitation.  16. Severe aortic valve stenosis.  17. Dilatation of the ascending aorta.  18. Estimated pulmonary artery systolic pressure is 58.6 mmHg assuming a   right atrial pressure of 15 mmHg, which is consistent with moderate   pulmonary hypertension.  19. LA volume Index is 56.0 ml/m² ml/m2.  20. The Dimesionless Index value is .17.  [id number] Micah Nguyễn , Electronically signed on 1/26/2024 at   2:58:40 PM     (05 Feb 2024 16:00)       REVIEW OF SYSTEMS  Constitutional: No fevers, chills, weight loss or fatigue   head: no trama  Eyes: No discharge	  ENMT: No sore throat, oral thrush, ulcers or exudate  Respiratory: No cough, no SOB  Cardiovascular:  No chest pain, palpitations or edema   Gastrointestinal: No pain, nausea, vomiting, diarrhea or constipation	  Genitourinary: No dysuria, discharge or flank pain  skin: b/l LE wounds  MSK: No arthralgias or back pain   Neurological: No HA, no weakness, no seizures, no AMS   psych: no anxiety    prior hospital charts reviewed [V]  primary team notes reviewed [V]  other consultant notes reviewed [V]    PAST MEDICAL & SURGICAL HISTORY:  Dyslipidemia      Hypertension      Chronic GERD      History of aortic stenosis      Cellulitis      No significant past surgical history      SOCIAL HISTORY:  , lives with family  no smoking/vaping/alcohol/recreational drug use  no recent travel    FAMILY HISTORY:  FH: congestive heart failure (Father, Sibling)    FH: coronary artery disease (Sibling)    FH: brain cancer (Mother)        Allergies  garlic (Angioedema; Swelling; Anaphylaxis)  No Known Drug Allergies        ANTIMICROBIALS:      ANTIMICROBIALS (past 90 days):  MEDICATIONS  (STANDING):        OTHER MEDS:   MEDICATIONS  (STANDING):  acetaminophen     Tablet .. 650 every 6 hours PRN  epoetin loulou-epbx (RETACRIT) Injectable 16740 every 7 days  furosemide   Injectable 40 two times a day  losartan 25 daily  metoprolol succinate ER 50 daily  oxycodone    5 mG/acetaminophen 325 mG 1 every 6 hours PRN  polyethylene glycol 3350 17 daily PRN  spironolactone 25 daily  tamsulosin 0.4 at bedtime      VITALS:  Vital Signs Last 24 Hrs  T(F): 97.9 (02-14-24 @ 12:11), Max: 99 (02-12-24 @ 20:08)    Vital Signs Last 24 Hrs  HR: 69 (02-14-24 @ 12:11) (69 - 87)  BP: 104/68 (02-14-24 @ 12:11) (104/68 - 118/70)  RR: 18 (02-14-24 @ 12:11)  SpO2: 97% (02-14-24 @ 12:11) (95% - 100%)  Wt(kg): --    EXAM:  General: Patient in no acute distress  Head: atraumatic, normocephalic  Eyes: anicteric  ENT:  no oral lesions  CV: S1+S2, no m/r/g appreciated   Lungs: No respiratory distress, CTAB  Abd: Soft, nontender, no guarding, no rebound tenderness, + bowel sounds   : no suprapubic or CVA tenderness  MSK: no joint swelling  Neuro: Alert and oriented, no focal deficits, CN II-XII grossly intact   Skin: BLE swelling. Anterior shin RLE with ulcer with scant drainage with surrounding erythema, minimal warmth. Posterior shin with ulcer with surrounding erythema, minimal warmth. LLE with ulcer with surrounding erythema, minimal warmth.  vascular: No phlebitis  psych: normal affect    Labs:                        8.3    8.62  )-----------( 219      ( 14 Feb 2024 07:13 )             27.5     02-14    139  |  100  |  46<H>  ----------------------------<  130<H>  4.1   |  27  |  1.32<H>    Ca    8.7      14 Feb 2024 07:28  Phos  2.4     02-14  Mg     1.9     02-14    TPro  6.0  /  Alb  3.4  /  TBili  1.6<H>  /  DBili  x   /  AST  23  /  ALT  68<H>  /  AlkPhos  67  02-14      WBC Trend:  WBC Count: 8.62 (02-14-24 @ 07:13)  WBC Count: 9.83 (02-13-24 @ 07:06)  WBC Count: 9.02 (02-12-24 @ 04:39)  WBC Count: 9.18 (02-11-24 @ 07:28)      Auto Neutrophil #: 9.24 K/uL (02-06-24 @ 05:23)  Auto Neutrophil #: 8.48 K/uL (01-31-24 @ 07:19)  Auto Neutrophil #: 6.95 K/uL (01-26-24 @ 06:45)  Auto Neutrophil #: 13.12 K/uL (01-25-24 @ 16:50)  Auto Neutrophil #: 6.25 K/uL (01-07-24 @ 10:09)      Creatine Trend:  Creatinine: 1.32 (02-14)  Creatinine: 1.38 (02-13)  Creatinine: 1.33 (02-13)  Creatinine: 1.33 (02-12)      Liver Biochemical Testing Trend:  Alanine Aminotransferase (ALT/SGPT): 68 *H* (02-14)  Alanine Aminotransferase (ALT/SGPT): 85 *H* (02-13)  Alanine Aminotransferase (ALT/SGPT): 100 *H* (02-12)  Alanine Aminotransferase (ALT/SGPT): 120 *H* (02-11)  Alanine Aminotransferase (ALT/SGPT): 142 *H* (02-10)  Aspartate Aminotransferase (AST/SGOT): 23 (02-14-24 @ 07:28)  Aspartate Aminotransferase (AST/SGOT): 29 (02-13-24 @ 07:06)  Aspartate Aminotransferase (AST/SGOT): 27 (02-12-24 @ 04:39)  Aspartate Aminotransferase (AST/SGOT): 30 (02-11-24 @ 07:31)  Aspartate Aminotransferase (AST/SGOT): 32 (02-10-24 @ 06:28)  Bilirubin Total: 1.6 (02-14)  Bilirubin Total: 1.9 (02-13)  Bilirubin Total: 2.2 (02-12)  Bilirubin Total: 2.4 (02-11)  Bilirubin Total: 2.3 (02-10)      Trend LDH  01-31-24 @ 07:19  300<H>    Urinalysis Basic - ( 14 Feb 2024 07:28 )    Color: x / Appearance: x / SG: x / pH: x  Gluc: 130 mg/dL / Ketone: x  / Bili: x / Urobili: x   Blood: x / Protein: x / Nitrite: x   Leuk Esterase: x / RBC: x / WBC x   Sq Epi: x / Non Sq Epi: x / Bacteria: x      MICROBIOLOGY:    MRSA PCR Result.: NotDetec (02-07-24 @ 22:42)  MRSA PCR Result.: NotDetec (01-26-24 @ 02:00)      Culture - Blood (collected 25 Jan 2024 16:55)  Source: .Blood Blood-Peripheral  Final Report:    No growth at 5 days    Culture - Blood (collected 25 Jan 2024 16:50)  Source: .Blood Blood-Peripheral  Final Report:    No growth at 5 days    Culture - Other (collected 07 Jan 2024 10:09)  Source: Drainage Sp. Instructions_Additional Info: R leg wound  Final Report:    Numerous Serratia marcescens    Rare Streptococcus dysgalactiae (Group C/G) "Susceptibilities not    performed"    Normal skin nancy isolated  Organism: Serratia marcescens  Organism: Serratia marcescens    Sensitivities:      Method Type: PARRIS      -  Amoxicillin/Clavulanic Acid: R >16/8      -  Ampicillin: R 16 These ampicillin results predict results for amoxicillin      -  Ampicillin/Sulbactam: R 16/8      -  Aztreonam: S <=4      -  Cefazolin: R >16      -  Cefepime: S <=2      -  Cefoxitin: R <=8      -  Ceftriaxone: S <=1      -  Ciprofloxacin: S <=0.25      -  Ertapenem: S <=0.5      -  Gentamicin: S <=2      -  Levofloxacin: S <=0.5      -  Meropenem: S <=1      -  Piperacillin/Tazobactam: S <=8      -  Tobramycin: S <=2      -  Trimethoprim/Sulfamethoxazole: S <=0.5/9.5    A1C with Estimated Average Glucose Result: 6.2 % (01-27-24 @ 09:00)      RADIOLOGY:  imaging below personally reviewed    < from: CT Angio Coronary TAVR OCTAVIA w/ IV Cont (02.08.24 @ 12:41) >  IMPRESSION:    1. Severelycalcified (Agatston calcium score 5251) trileaflet aortic   valve.    2. Aortic and peripheral access vessel measurements as reported above.    3. Markedly enlarged heart. Correlate with echocardiogram. Evidence of   third spacing with chest/abdominal wall soft tissue edema and small   ascites.    4. Findings suggestive of mid/distal esophagitis. Correlate clinically.    5. Indeterminate right adrenal nodule measures 14 mm. Correlate with   abdominal MRI for characterization.    6. [id number] mm upper anterior subcutaneous dermal lesion, image   103 series 15, incompletely evaluated. Again, epidermal inclusion cyst is   in the differential. Recommend dermatology evaluation.    < end of copied text >

## 2024-02-14 NOTE — TELEPHONE ENCOUNTER
From: Jose Simon Jr.  To: Sincere Greenberg  Sent: 2/13/2024 6:28 PM CST  Subject: Diabetes Prescription    Evening Dr. Greenberg  The lancets and strips that were sent in for me are not covered by Medicare. I believe the last prescription was for Isreal strips and lancets?  Thank you  Jordi Simon

## 2024-02-15 RX ORDER — PERPHENAZINE 16 MG/1
TABLET, FILM COATED ORAL
Qty: 100 STRIP | Refills: 11 | Status: SHIPPED | OUTPATIENT
Start: 2024-02-15

## 2024-02-15 NOTE — TELEPHONE ENCOUNTER
Received a call from [facility] in [place] asking us to resend the patient's test strips and to PLEASE put Contour Next on the prescription as that is per Medicare. It is considered a Isreal Brand but in order to be compliant with Medicare it must say Contour Next. Sent in new script.

## 2024-02-19 ENCOUNTER — TELEPHONE (OUTPATIENT)
Dept: ALLERGY | Facility: CLINIC | Age: 69
End: 2024-02-19

## 2024-02-19 NOTE — TELEPHONE ENCOUNTER
Spoke to patient. Changed appointment to virtual. Pt understands how to do a virtual appointment. No further action needed at this time.

## 2024-02-20 ENCOUNTER — TELEMEDICINE (OUTPATIENT)
Dept: ALLERGY | Facility: CLINIC | Age: 69
End: 2024-02-20
Payer: MEDICARE

## 2024-02-20 DIAGNOSIS — J30.2 PERENNIAL ALLERGIC RHINITIS WITH SEASONAL VARIATION: ICD-10-CM

## 2024-02-20 DIAGNOSIS — Z23 FLU VACCINE NEED: ICD-10-CM

## 2024-02-20 DIAGNOSIS — J32.8 OTHER CHRONIC SINUSITIS: ICD-10-CM

## 2024-02-20 DIAGNOSIS — J30.89 PERENNIAL ALLERGIC RHINITIS WITH SEASONAL VARIATION: ICD-10-CM

## 2024-02-20 DIAGNOSIS — Z92.29 COVID-19 VACCINE SERIES COMPLETED: ICD-10-CM

## 2024-02-20 DIAGNOSIS — J45.20 MILD INTERMITTENT EXTRINSIC ASTHMA WITHOUT COMPLICATION (HCC): Primary | ICD-10-CM

## 2024-02-20 PROCEDURE — 99214 OFFICE O/P EST MOD 30 MIN: CPT | Performed by: ALLERGY & IMMUNOLOGY

## 2024-02-20 NOTE — PROGRESS NOTES
Jose BIGGS Alfred Jefferson. is a 68 year old male.    HPI:   No chief complaint on file.    Patient is a 68-year-old male who presents for follow-up with a chief complaint of allergies  Patient presents via video visit    This visit is conducted using Telemedicine with live, interactive video and audio during this Coronavirus pandemic.     Please note that the following visit was completed using two-way, real-time interactive audio and/or video communication.  This has been done in good moiz to provide continuity of care in the best interest of the provider-patient relationship, due to the ongoing public health crisis/national emergency and because of restrictions of visitation.  There are limitations of this visit as no physical exam could be performed.  Every conscious effort was taken to allow for sufficient and adequate time.  This billing was spent on reviewing labs, medications, radiology tests and decision making.  Appropriate medical decision-making and tests are ordered as detailed in the plan of care below     Patient last seen by me in February 2023  Patient has a history of asthma allergic rhinitis and chronic sinusitis  Patient currently on maintenance dose immunotherapy since January 2022 to ragweed mold cats and dogs  Medication list includes Singulair Xyzal albuterol      Pneumonia vaccine up-to-date  High-dose flu vaccine up-to-date  COVID-vaccine x 2 doses last in 2021    Today patient reports      Ar:  Active or persistent symptoms: stable with meds and ait   Active meds: singulair, xyzal   pets : none   No issues with ait   Much better with ait       Asthma  Active or persistent symptoms: denies   ED visits or prednisone in the interim: denies   Active meds:  singulair , alb prn       Chronic sinusitis  1 SI in interim , needed abx     Headed to [place] in June for his 50th wedding anniversary      HISTORY:  Past Medical History:   Diagnosis Date    Allergic rhinitis     Asthma (HCC)     Back problem      Degenerative cervical disc     Diabetes (HCC)     Essential hypertension     Lipid screening 03-    Migraine headache     Migraines     Peptic ulcer disease     Seasonal allergies     Visual impairment     glasses      Past Surgical History:   Procedure Laterality Date    COLONOSCOPY  2012    repeat in 2017    COLONOSCOPY  2017    repeat 2024    VASECTOMY        Family History   Problem Relation Age of Onset    Hypertension Father     Diabetes Father     Cancer Father         colon    Heart Disorder Father     Other (Other) Mother         copd    Cancer Daughter         thyroid    Hypertension Son     Cancer Paternal Grandmother         colon      Social History:   Social History     Socioeconomic History    Marital status:    Tobacco Use    Smoking status: Never    Smokeless tobacco: Never   Vaping Use    Vaping Use: Never used   Substance and Sexual Activity    Alcohol use: Not Currently     Alcohol/week: 0.0 standard drinks of alcohol     Comment: socially    Drug use: No        Medications (Active prior to today's visit):  Current Outpatient Medications   Medication Sig Dispense Refill    Glucose Blood (CONTOUR NEXT TEST) In Vitro Strip Test blood sugar once daily 100 strip 11    Microlet Lancets Does not apply Misc 1 each daily 100 each 3    Glucose Blood (BLOOD GLUCOSE TEST STRIPS 333) In Vitro Strip 1 each by In Vitro route daily. Isreal 100 strip 3    OneTouch UltraSoft Lancets Does not apply Misc Test blood sugar once a day 100 each 11    montelukast 10 MG Oral Tab Take 1 tablet (10 mg total) by mouth nightly. 90 tablet 3    losartan-hydroCHLOROthiazide 100-25 MG Oral Tab Take 1 tablet by mouth daily. 90 tablet 3    metoprolol succinate ER 50 MG Oral Tablet 24 Hr Take 1 tablet (50 mg total) by mouth daily. 90 tablet 3    tamsulosin 0.4 MG Oral Cap Take 1 capsule (0.4 mg total) by mouth nightly. TAKE AT BEDTIME 90 capsule 3    sertraline 50 MG Oral Tab Take 1 tablet (50 mg total) by mouth daily.  90 tablet 3    traMADol 50 MG Oral Tab TAKE ONE TABLET BY MOUTH EVERY SIX HOURS AS NEEDED FOR PAIN 50 tablet 5    LEVOCETIRIZINE 5 MG Oral Tab TAKE ONE TABLET BY MOUTH NIGHTLY 90 tablet 0    albuterol (PROAIR HFA) 108 (90 Base) MCG/ACT Inhalation Aero Soln Inhale 2 puffs into the lungs every 4 to 6 hours as needed for Wheezing. 3 each 3    pantoprazole 40 MG Oral Tab EC Take 1 tablet (40 mg total) by mouth before breakfast. 90 tablet 3    zolpidem 5 MG Oral Tab Take 1 tablet (5 mg total) by mouth nightly as needed for Sleep. 20 tablet 0    metFORMIN  MG Oral Tablet 24 Hr Take 4 tablets (2,000 mg total) by mouth every evening. 360 tablet 3    glimepiride 4 MG Oral Tab TAKE ONE TABLET BY MOUTH in the morning and take one tablet by mouth in the evening 180 tablet 2    ATORVASTATIN 40 MG Oral Tab Take 1 tablet (40 mg total) by mouth nightly. 90 tablet 3    SUMAtriptan Succinate 100 MG Oral Tab Take 1 tab as needed for headache, may repeat in 2 hours, maximum 2 tabs in 24 hours. 9 tablet 2    ondansetron (ZOFRAN) 4 mg tablet Take 1 tablet (4 mg total) by mouth every 8 (eight) hours as needed for Nausea. 10 tablet 3    pseudoephedrine  MG Oral Tablet 12 Hr Take 1 tablet (120 mg total) by mouth every 12 (twelve) hours. 60 tablet 3    LORATADINE 10 MG Oral Tab TAKE ONE TABLET BY MOUTH ONE TIME DAILY  (Patient taking differently: No sig reported) 90 tablet 2    aspirin 81 MG Oral Tab Take 1 tablet (81 mg total) by mouth daily.      Multiple Vitamins-Minerals (MULTIVITAMIN ADULT OR) Take by mouth.      Glucose Blood (SEPIDEH CONTOUR TEST) In Vitro Strip Test twice a day 100 each 11    SEPIDEH MICROLET LANCETS Does not apply Misc Test twice a day 100 each 11    ALPRAZolam 0.5 MG Oral Tab Take 1 tablet (0.5 mg total) by mouth daily.      Omega-3 Fatty Acids (OMEGA-3 FISH OIL) 1200 MG Oral Cap Take  by mouth.         Allergies:  Allergies   Allergen Reactions    Cats Claw, Uncaria Tomentosa ITCHING     Dander     Mold  Runny nose    Ragweed ITCHING         ROS:   Allergic/Immuno:  See hpi  Cardiovascular:  Negative for irregular heartbeat/palpitations, chest pain, edema  Constitutional:  Negative night sweats,weight loss, irritability and lethargy  ENMT:  Negative for ear drainage, hearing loss and nasal drainage  Eyes:  Negative for eye discharge and vision loss  Gastrointestinal:  Negative for abdominal pain, diarrhea and vomiting  Integumentary:  Negative for pruritus and rash  Respiratory:  Negative for cough, dyspnea and wheezing    PHYSICAL EXAM:   Constitutional: responsive, no acute distress noted  Head/Face: NC/Atraumatic  Eyes/Vision: conjunctiva and lids are normal extraocular motion is intact      ASSESSMENT/PLAN:   Assessment   Encounter Diagnoses   Name Primary?    Mild intermittent extrinsic asthma without complication Yes    Other chronic sinusitis     Perennial allergic rhinitis with seasonal variation     COVID-19 vaccine series completed     Flu vaccine need        #1 asthma  No ED visits or prednisone in the interim.  Symptoms more than 2 days/week with current singular, montelukast once a day  Continue with singular once a day  Albuterol 2 puffs every 4-6 hours if having active coughing wheezing or shortness of breath  Reviewed signs and symptoms of persistent asthma including the rules of 2    #2 chronic sinusitis  1 sinus infection that required antibiotics over the past 12 months.  This is down from 3-4 times per year prior to immunotherapy  Continue with current allergy regimen including immunotherapy Xyzal and Singulair  May add Flonase 2 sprays per nostril once a day if refractory    #3 allergic rhinitis  Overall much improved with immunotherapy.  On maintenance dosing for 2 years now since January 2022  Reviewed currently recommended 3 to 5 years of maintenance dosing  Continue with current medications including Xyzal and singular  Reviewed avoidance measures    #4 flu vaccine recommended in the fall  and up-to-date high dose    #5 pneumonia vaccine up-to-date from 2022    #6 COVID vaccines reviewed.  Recommend booster         Orders This Visit:  No orders of the defined types were placed in this encounter.      Meds This Visit:  Requested Prescriptions      No prescriptions requested or ordered in this encounter       Imaging & Referrals:  None     2/20/2024  Basil Irby MD    If medication samples were provided today, they were provided solely for patient education and training related to self administration of these medications.  Teaching, instruction and sample was provided to the patient by myself.  Teaching included  a review of potential adverse side effects as well as potential efficacy.  Patient's questions were answered in regards to medication administration and dosing and potential side effects. Teaching was provided via the teach back method

## 2024-02-28 ENCOUNTER — HOSPITAL ENCOUNTER (OUTPATIENT)
Dept: CT IMAGING | Age: 69
Discharge: HOME | End: 2024-02-28
Attending: INTERNAL MEDICINE

## 2024-02-28 DIAGNOSIS — Z13.6 SCREENING FOR CARDIOVASCULAR CONDITION: ICD-10-CM

## 2024-02-29 ENCOUNTER — PATIENT MESSAGE (OUTPATIENT)
Dept: INTERNAL MEDICINE CLINIC | Facility: CLINIC | Age: 69
End: 2024-02-29

## 2024-02-29 NOTE — TELEPHONE ENCOUNTER
From: Jose Simon Jr.  To: Sincere Greenberg  Sent: 2/29/2024 10:20 AM CST  Subject: Heart Scan    Hi Dr. Greenberg! I had a heart scan done on Wednesday and was just wondering if you have had a chance to look it over. Thank you!

## 2024-03-02 RX ORDER — LEVOCETIRIZINE DIHYDROCHLORIDE 5 MG/1
5 TABLET, FILM COATED ORAL NIGHTLY
Qty: 90 TABLET | Refills: 1 | Status: SHIPPED | OUTPATIENT
Start: 2024-03-02

## 2024-03-02 NOTE — TELEPHONE ENCOUNTER
Received refill request for Xyzal 5 mg- 1 tablet by mouth daily.     Last office visit was 2/2024 for allergies.    Refill meets protocol- refill sent.

## 2024-03-04 ENCOUNTER — PATIENT MESSAGE (OUTPATIENT)
Dept: INTERNAL MEDICINE CLINIC | Facility: CLINIC | Age: 69
End: 2024-03-04

## 2024-03-04 DIAGNOSIS — R93.1 ELEVATED CORONARY ARTERY CALCIUM SCORE: Primary | ICD-10-CM

## 2024-03-06 NOTE — TELEPHONE ENCOUNTER
Jacqueline msg sent with referral info, advised to schedule appt.    Sincere Greenberg MD  3/4/2024  4:51 PM CST       You coronary calcium score is 376 , moderate plaque.   I am placing a referral for you to see a cardiologist . Dr Velasquez .

## 2024-03-12 ENCOUNTER — NURSE ONLY (OUTPATIENT)
Dept: ALLERGY | Facility: CLINIC | Age: 69
End: 2024-03-12

## 2024-03-12 DIAGNOSIS — J30.89 ENVIRONMENTAL AND SEASONAL ALLERGIES: Primary | ICD-10-CM

## 2024-03-12 PROCEDURE — 95117 IMMUNOTHERAPY INJECTIONS: CPT | Performed by: ALLERGY & IMMUNOLOGY

## 2024-03-21 ENCOUNTER — MED REC SCAN ONLY (OUTPATIENT)
Dept: INTERNAL MEDICINE CLINIC | Facility: CLINIC | Age: 69
End: 2024-03-21

## 2024-03-22 ENCOUNTER — TELEMEDICINE (OUTPATIENT)
Dept: INTERNAL MEDICINE CLINIC | Facility: CLINIC | Age: 69
End: 2024-03-22
Payer: MEDICARE

## 2024-03-22 DIAGNOSIS — E78.00 PURE HYPERCHOLESTEROLEMIA: ICD-10-CM

## 2024-03-22 DIAGNOSIS — I10 ESSENTIAL HYPERTENSION, BENIGN: ICD-10-CM

## 2024-03-22 DIAGNOSIS — E11.9 DIABETES MELLITUS WITHOUT COMPLICATION (HCC): Primary | ICD-10-CM

## 2024-03-22 PROCEDURE — 99214 OFFICE O/P EST MOD 30 MIN: CPT | Performed by: INTERNAL MEDICINE

## 2024-03-22 NOTE — ASSESSMENT & PLAN NOTE
Glucose numbers reviewed, definitely higher in the mornings.   Patient doesn't eat much all day and has more at night time.  Discussed a snack at night prior to going to bed.  He got a treadmill and will be starting to exercise more.

## 2024-03-22 NOTE — PROGRESS NOTES
HPI:    Patient ID: Jose Simon Jr. is a 68 year old male.    HPI  Jose Simon Jr. verbally consents to a telemedicine service with live, interactive video and audio on 3/22/2024.  Patient understands and accepts financial responsibility for any deductible, co-insurance and/or co-pays associated with this service.     Patient has sent over his glucose readings,   they are higher in the morning .   He eats more in the evenings, not much during the day .   Discussed having protein during the day to decrease night time hunger.   Discussed snack before bedtime , mandy crackers , to avoid glycogenolysis .      Review of Systems   Constitutional: Negative.    HENT: Negative.     Eyes: Negative.    Respiratory: Negative.     Cardiovascular:  Negative for chest pain and leg swelling.   Gastrointestinal: Negative.    Endocrine: Negative.    Genitourinary:  Negative for decreased urine volume, difficulty urinating, dysuria, flank pain, frequency, hematuria and urgency.   Musculoskeletal:  Negative for arthralgias, back pain, gait problem, joint swelling and myalgias.   Skin:  Negative for color change, rash and wound.   Allergic/Immunologic: Negative.    Neurological: Negative.  Negative for headaches.   Psychiatric/Behavioral:  Negative for agitation and confusion. The patient is not nervous/anxious.             Current Outpatient Medications   Medication Sig Dispense Refill    levocetirizine 5 MG Oral Tab TAKE ONE TABLET BY MOUTH NIGHTLY 90 tablet 1    Glucose Blood (CONTOUR NEXT TEST) In Vitro Strip Test blood sugar once daily 100 strip 11    Microlet Lancets Does not apply Misc 1 each daily 100 each 3    Glucose Blood (BLOOD GLUCOSE TEST STRIPS 333) In Vitro Strip 1 each by In Vitro route daily. Isreal 100 strip 3    OneTouch UltraSoft Lancets Does not apply Misc Test blood sugar once a day 100 each 11    montelukast 10 MG Oral Tab Take 1 tablet (10 mg total) by mouth nightly. 90 tablet 3     losartan-hydroCHLOROthiazide 100-25 MG Oral Tab Take 1 tablet by mouth daily. 90 tablet 3    metoprolol succinate ER 50 MG Oral Tablet 24 Hr Take 1 tablet (50 mg total) by mouth daily. 90 tablet 3    tamsulosin 0.4 MG Oral Cap Take 1 capsule (0.4 mg total) by mouth nightly. TAKE AT BEDTIME 90 capsule 3    sertraline 50 MG Oral Tab Take 1 tablet (50 mg total) by mouth daily. 90 tablet 3    traMADol 50 MG Oral Tab TAKE ONE TABLET BY MOUTH EVERY SIX HOURS AS NEEDED FOR PAIN 50 tablet 5    albuterol (PROAIR HFA) 108 (90 Base) MCG/ACT Inhalation Aero Soln Inhale 2 puffs into the lungs every 4 to 6 hours as needed for Wheezing. 3 each 3    pantoprazole 40 MG Oral Tab EC Take 1 tablet (40 mg total) by mouth before breakfast. 90 tablet 3    zolpidem 5 MG Oral Tab Take 1 tablet (5 mg total) by mouth nightly as needed for Sleep. 20 tablet 0    metFORMIN  MG Oral Tablet 24 Hr Take 4 tablets (2,000 mg total) by mouth every evening. 360 tablet 3    glimepiride 4 MG Oral Tab TAKE ONE TABLET BY MOUTH in the morning and take one tablet by mouth in the evening 180 tablet 2    ATORVASTATIN 40 MG Oral Tab Take 1 tablet (40 mg total) by mouth nightly. 90 tablet 3    SUMAtriptan Succinate 100 MG Oral Tab Take 1 tab as needed for headache, may repeat in 2 hours, maximum 2 tabs in 24 hours. 9 tablet 2    ondansetron (ZOFRAN) 4 mg tablet Take 1 tablet (4 mg total) by mouth every 8 (eight) hours as needed for Nausea. 10 tablet 3    pseudoephedrine  MG Oral Tablet 12 Hr Take 1 tablet (120 mg total) by mouth every 12 (twelve) hours. 60 tablet 3    LORATADINE 10 MG Oral Tab TAKE ONE TABLET BY MOUTH ONE TIME DAILY  (Patient taking differently: No sig reported) 90 tablet 2    aspirin 81 MG Oral Tab Take 1 tablet (81 mg total) by mouth daily.      Multiple Vitamins-Minerals (MULTIVITAMIN ADULT OR) Take by mouth.      Glucose Blood (SEPIDEH CONTOUR TEST) In Vitro Strip Test twice a day 100 each 11    SEPIDEH MICROLET LANCETS Does not apply  Misc Test twice a day 100 each 11    ALPRAZolam 0.5 MG Oral Tab Take 1 tablet (0.5 mg total) by mouth daily.      Omega-3 Fatty Acids (OMEGA-3 FISH OIL) 1200 MG Oral Cap Take  by mouth.       Allergies:  Allergies   Allergen Reactions    Cats Claw, Uncaria Tomentosa ITCHING     Dander     Mold Runny nose    Ragweed ITCHING      PHYSICAL EXAM:   There were no vitals taken for this visit.     Physical Exam  Constitutional:       Appearance: Normal appearance.   Pulmonary:      Effort: Pulmonary effort is normal.   Neurological:      Mental Status: He is alert and oriented to person, place, and time.   Psychiatric:         Mood and Affect: Mood normal.         Behavior: Behavior normal.         Thought Content: Thought content normal.         Judgment: Judgment normal.                ASSESSMENT/PLAN:   Pure hypercholesterolemia  Iipids are controlled, will recheck .     Essential hypertension, benign  Bp is controlled.     Diabetes mellitus without complication (HCC)  Glucose numbers reviewed, definitely higher in the mornings.   Patient doesn't eat much all day and has more at night time.  Discussed a snack at night prior to going to bed.  He got a treadmill and will be starting to exercise more.     Orders Placed This Encounter   Procedures    Comp Metabolic Panel (14)    Hemoglobin A1C    Lipid Panel       Meds This Visit:  Requested Prescriptions      No prescriptions requested or ordered in this encounter       Imaging & Referrals:  None       ID#[id number]

## 2024-04-05 RX ORDER — GLIMEPIRIDE 4 MG/1
TABLET ORAL
Qty: 180 TABLET | Refills: 1 | Status: SHIPPED | OUTPATIENT
Start: 2024-04-05

## 2024-04-05 NOTE — TELEPHONE ENCOUNTER
Please review.  Protocol failed / Has no protocol.     Requested Prescriptions   Pending Prescriptions Disp Refills    GLIMEPIRIDE 4 MG Oral Tab [Pharmacy Med Name: Glimepiride 4 Mg Tab ] 180 tablet 1     Sig: TAKE ONE TABLET BY MOUTH in the morning and take one tablet by mouth in the evening       Diabetes Medication Protocol Failed - 4/5/2024  1:31 AM        Failed - Last A1C < 7.5 and within past 6 months     Lab Results   Component Value Date    A1C 7.0 (H) 09/19/2023             Passed - In person appointment or virtual visit in the past 6 mos or appointment in next 3 mos     Recent Outpatient Visits              2 weeks ago Diabetes mellitus without complication (HCC)    [facility] Sincere Greenberg MD    Telemedicine    3 weeks ago Environmental and seasonal allergies [J30.89]    [facility]    Nurse Only    1 month ago Mild intermittent extrinsic asthma without complication (HCC)    [facility] Basil Irby MD    Telemedicine    1 month ago Diabetes mellitus without complication (HCC)    [facility] Sincere Greenberg MD    Telemedicine    1 month ago Environmental and seasonal allergies    [facility]    Nurse Only          Future Appointments         Provider Department Appt Notes    In 4 days [facility]     In 1 [facility]    In 1 month [facility]     In 2 months [facility]                Passed - Microalbumin procedure in past 12 months or taking ACE/ARB        Passed - EGFRCR or GFRNAA > 50     GFR Evaluation  EGFRCR: 88 , resulted on  9/19/2023          Passed - GFR in the past 12 months

## 2024-04-09 ENCOUNTER — NURSE ONLY (OUTPATIENT)
Dept: ALLERGY | Facility: CLINIC | Age: 69
End: 2024-04-09

## 2024-04-09 DIAGNOSIS — J30.89 ENVIRONMENTAL AND SEASONAL ALLERGIES: Primary | ICD-10-CM

## 2024-04-09 PROCEDURE — 95117 IMMUNOTHERAPY INJECTIONS: CPT | Performed by: ALLERGY & IMMUNOLOGY

## 2024-05-03 ENCOUNTER — LAB ENCOUNTER (OUTPATIENT)
Dept: LAB | Age: 69
End: 2024-05-03
Attending: INTERNAL MEDICINE
Payer: MEDICARE

## 2024-05-03 DIAGNOSIS — E11.9 DIABETES MELLITUS WITHOUT COMPLICATION (HCC): ICD-10-CM

## 2024-05-03 DIAGNOSIS — E78.00 PURE HYPERCHOLESTEROLEMIA: ICD-10-CM

## 2024-05-03 LAB
ALBUMIN SERPL-MCNC: 4.6 G/DL (ref 3.2–4.8)
ALBUMIN/GLOB SERPL: 1.7 {RATIO} (ref 1–2)
ALP LIVER SERPL-CCNC: 56 U/L
ALT SERPL-CCNC: 27 U/L
ANION GAP SERPL CALC-SCNC: 2 MMOL/L (ref 0–18)
AST SERPL-CCNC: 24 U/L (ref ?–34)
BILIRUB SERPL-MCNC: 0.8 MG/DL (ref 0.2–1.1)
BUN BLD-MCNC: 19 MG/DL (ref 9–23)
BUN/CREAT SERPL: 18.6 (ref 10–20)
CALCIUM BLD-MCNC: 9.6 MG/DL (ref 8.7–10.4)
CHLORIDE SERPL-SCNC: 109 MMOL/L (ref 98–112)
CHOLEST SERPL-MCNC: 126 MG/DL (ref ?–200)
CO2 SERPL-SCNC: 31 MMOL/L (ref 21–32)
CREAT BLD-MCNC: 1.02 MG/DL
EGFRCR SERPLBLD CKD-EPI 2021: 80 ML/MIN/1.73M2 (ref 60–?)
EST. AVERAGE GLUCOSE BLD GHB EST-MCNC: 160 MG/DL (ref 68–126)
FASTING PATIENT LIPID ANSWER: YES
FASTING STATUS PATIENT QL REPORTED: YES
GLOBULIN PLAS-MCNC: 2.7 G/DL (ref 2–3.5)
GLUCOSE BLD-MCNC: 193 MG/DL (ref 70–99)
HBA1C MFR BLD: 7.2 % (ref ?–5.7)
HDLC SERPL-MCNC: 53 MG/DL (ref 40–59)
LDLC SERPL CALC-MCNC: 60 MG/DL (ref ?–100)
NONHDLC SERPL-MCNC: 73 MG/DL (ref ?–130)
OSMOLALITY SERPL CALC.SUM OF ELEC: 302 MOSM/KG (ref 275–295)
POTASSIUM SERPL-SCNC: 4.4 MMOL/L (ref 3.5–5.1)
PROT SERPL-MCNC: 7.3 G/DL (ref 5.7–8.2)
SODIUM SERPL-SCNC: 142 MMOL/L (ref 136–145)
TRIGL SERPL-MCNC: 57 MG/DL (ref 30–149)
VLDLC SERPL CALC-MCNC: 8 MG/DL (ref 0–30)

## 2024-05-03 PROCEDURE — 36415 COLL VENOUS BLD VENIPUNCTURE: CPT | Performed by: INTERNAL MEDICINE

## 2024-05-03 PROCEDURE — 80061 LIPID PANEL: CPT

## 2024-05-03 PROCEDURE — 83036 HEMOGLOBIN GLYCOSYLATED A1C: CPT | Performed by: INTERNAL MEDICINE

## 2024-05-03 PROCEDURE — 80053 COMPREHEN METABOLIC PANEL: CPT

## 2024-05-07 ENCOUNTER — HOSPITAL ENCOUNTER (OUTPATIENT)
Dept: CT IMAGING | Facility: HOSPITAL | Age: 69
Discharge: HOME | End: 2024-05-07
Attending: INTERNAL MEDICINE
Payer: MEDICARE

## 2024-05-07 ENCOUNTER — TELEMEDICINE (OUTPATIENT)
Dept: INTERNAL MEDICINE CLINIC | Facility: CLINIC | Age: 69
End: 2024-05-07
Payer: MEDICARE

## 2024-05-07 VITALS
WEIGHT: 227 LBS | HEIGHT: 72 IN | SYSTOLIC BLOOD PRESSURE: 148 MMHG | RESPIRATION RATE: 10 BRPM | HEART RATE: 57 BPM | BODY MASS INDEX: 30.75 KG/M2 | DIASTOLIC BLOOD PRESSURE: 71 MMHG

## 2024-05-07 DIAGNOSIS — E11.9 DIABETES MELLITUS WITHOUT COMPLICATION (HCC): Primary | ICD-10-CM

## 2024-05-07 DIAGNOSIS — R06.02 SHORTNESS OF BREATH: ICD-10-CM

## 2024-05-07 DIAGNOSIS — I10 ESSENTIAL HYPERTENSION, BENIGN: ICD-10-CM

## 2024-05-07 DIAGNOSIS — R93.1 ELEVATED CORONARY ARTERY CALCIUM SCORE: ICD-10-CM

## 2024-05-07 PROCEDURE — 75574 CT ANGIO HRT W/3D IMAGE: CPT | Performed by: INTERNAL MEDICINE

## 2024-05-07 PROCEDURE — 99214 OFFICE O/P EST MOD 30 MIN: CPT | Performed by: INTERNAL MEDICINE

## 2024-05-07 PROCEDURE — 75580 N-INVAS EST C FFR SW ALY CTA: CPT | Performed by: INTERNAL MEDICINE

## 2024-05-07 RX ORDER — DILTIAZEM HYDROCHLORIDE 5 MG/ML
5 INJECTION INTRAVENOUS SEE ADMIN INSTRUCTIONS
Status: DISCONTINUED | OUTPATIENT
Start: 2024-05-07 | End: 2024-05-09

## 2024-05-07 RX ORDER — METOPROLOL TARTRATE 1 MG/ML
5 INJECTION, SOLUTION INTRAVENOUS SEE ADMIN INSTRUCTIONS
Status: DISCONTINUED | OUTPATIENT
Start: 2024-05-07 | End: 2024-05-09

## 2024-05-07 RX ORDER — NITROGLYCERIN 0.4 MG/1
0.4 TABLET SUBLINGUAL ONCE
Status: COMPLETED | OUTPATIENT
Start: 2024-05-07 | End: 2024-05-07

## 2024-05-07 RX ADMIN — NITROGLYCERIN 0.4 MG: 0.4 TABLET SUBLINGUAL at 08:33:00

## 2024-05-07 NOTE — PROGRESS NOTES
HPI:    Patient ID: Jose Simon Jr. is a 68 year old male.    HPI  Jose Simon Jr. verbally consents to a telemedicine service with live, interactive video and audio on 5/7/2024.  Patient understands and accepts financial responsibility for any deductible, co-insurance and/or co-pays associated with this service.   Recent labs show a glucose of 198 , hgba1c of 7.2, normal lipids , liver and kidney functions are normal.    Glucose is high in the mornings.   It did not help eating a snack at night time.   On glimperide and metformin .    Bp and pulse were normal this morning.   CT chest normal Ca. Score pending .   Review of Systems   Constitutional: Negative.    HENT: Negative.     Eyes: Negative.    Respiratory: Negative.     Cardiovascular:  Negative for chest pain and leg swelling.   Gastrointestinal: Negative.    Endocrine: Negative.    Genitourinary:  Negative for decreased urine volume, difficulty urinating, dysuria, flank pain, frequency, hematuria and urgency.   Musculoskeletal:  Negative for arthralgias, back pain, gait problem, joint swelling and myalgias.   Skin:  Negative for color change, rash and wound.   Allergic/Immunologic: Negative.    Neurological: Negative.  Negative for headaches.   Psychiatric/Behavioral:  Negative for agitation and confusion. The patient is not nervous/anxious.             Current Outpatient Medications   Medication Sig Dispense Refill    glimepiride 4 MG Oral Tab Take 1 tablet (4 mg total) by mouth every morning before breakfast AND 1 tablet (4 mg total) before evening meal. 180 tablet 1    levocetirizine 5 MG Oral Tab TAKE ONE TABLET BY MOUTH NIGHTLY 90 tablet 1    Glucose Blood (CONTOUR NEXT TEST) In Vitro Strip Test blood sugar once daily 100 strip 11    Microlet Lancets Does not apply Misc 1 each daily 100 each 3    Glucose Blood (BLOOD GLUCOSE TEST STRIPS 333) In Vitro Strip 1 each by In Vitro route daily. Isreal 100 strip 3    OneTouch UltraSoft Lancets Does not  apply Misc Test blood sugar once a day 100 each 11    montelukast 10 MG Oral Tab Take 1 tablet (10 mg total) by mouth nightly. 90 tablet 3    losartan-hydroCHLOROthiazide 100-25 MG Oral Tab Take 1 tablet by mouth daily. 90 tablet 3    metoprolol succinate ER 50 MG Oral Tablet 24 Hr Take 1 tablet (50 mg total) by mouth daily. 90 tablet 3    tamsulosin 0.4 MG Oral Cap Take 1 capsule (0.4 mg total) by mouth nightly. TAKE AT BEDTIME 90 capsule 3    sertraline 50 MG Oral Tab Take 1 tablet (50 mg total) by mouth daily. 90 tablet 3    traMADol 50 MG Oral Tab TAKE ONE TABLET BY MOUTH EVERY SIX HOURS AS NEEDED FOR PAIN 50 tablet 5    albuterol (PROAIR HFA) 108 (90 Base) MCG/ACT Inhalation Aero Soln Inhale 2 puffs into the lungs every 4 to 6 hours as needed for Wheezing. 3 each 3    zolpidem 5 MG Oral Tab Take 1 tablet (5 mg total) by mouth nightly as needed for Sleep. 20 tablet 0    metFORMIN  MG Oral Tablet 24 Hr Take 4 tablets (2,000 mg total) by mouth every evening. 360 tablet 3    ATORVASTATIN 40 MG Oral Tab Take 1 tablet (40 mg total) by mouth nightly. 90 tablet 3    SUMAtriptan Succinate 100 MG Oral Tab Take 1 tab as needed for headache, may repeat in 2 hours, maximum 2 tabs in 24 hours. 9 tablet 2    ondansetron (ZOFRAN) 4 mg tablet Take 1 tablet (4 mg total) by mouth every 8 (eight) hours as needed for Nausea. 10 tablet 3    pseudoephedrine  MG Oral Tablet 12 Hr Take 1 tablet (120 mg total) by mouth every 12 (twelve) hours. 60 tablet 3    LORATADINE 10 MG Oral Tab TAKE ONE TABLET BY MOUTH ONE TIME DAILY  (Patient taking differently: No sig reported) 90 tablet 2    aspirin 81 MG Oral Tab Take 1 tablet (81 mg total) by mouth daily.      Multiple Vitamins-Minerals (MULTIVITAMIN ADULT OR) Take by mouth.      Glucose Blood (SEPIDEH CONTOUR TEST) In Vitro Strip Test twice a day 100 each 11    SEPIDEH MICROLET LANCETS Does not apply Misc Test twice a day 100 each 11    ALPRAZolam 0.5 MG Oral Tab Take 1 tablet (0.5  mg total) by mouth daily.      Omega-3 Fatty Acids (OMEGA-3 FISH OIL) 1200 MG Oral Cap Take  by mouth.       Allergies:  Allergies   Allergen Reactions    Cats Claw, Uncaria Tomentosa ITCHING     Dander     Mold Runny nose    Ragweed ITCHING      PHYSICAL EXAM:   There were no vitals taken for this visit.     Physical Exam  Constitutional:       Appearance: Normal appearance.   Pulmonary:      Effort: Pulmonary effort is normal.   Neurological:      Mental Status: He is oriented to person, place, and time.   Psychiatric:         Mood and Affect: Mood normal.         Behavior: Behavior normal.         Thought Content: Thought content normal.         Judgment: Judgment normal.                ASSESSMENT/PLAN:   Essential hypertension, benign  Bp and HR well controlled.     Diabetes mellitus without complication (HCC)  Hgba1c is 7.2 , goal is less than 7.0 ,  patient notes glucose is up at in the mornings.   He tried mandy crackers at night , it did not help .  Patient will walk , or use the treadmill after evening meal     No orders of the defined types were placed in this encounter.      Meds This Visit:  Requested Prescriptions      No prescriptions requested or ordered in this encounter       Imaging & Referrals:  None       ID#[id number]

## 2024-05-07 NOTE — IMAGING NOTE
TO RAD HOLDING AT 0803    HX TAKEN: PT EASILY FATIGUED, DENIES CHEST DISCOMFORT OR DYSPNEA. ABNORMAL CALCIUM SCORE 2/2024.    PT CONSENTED AT 0812     BASELINE VITAL SIGNS: HR 57  /71, BMI 30.8    CTA ORDERED BY GUY SARABIA MD; WAS PT GIVEN CTA PREMEDS: NO    18 GAUGE IV STARTED AT 05/3/24 GFR = 80   CREATINE = 1.2    TO CT TABLE @ 0831    CONNECT TO MONITOR  HR 57 /78 AT 0832      NITROGLYCERIN 0.4 MILLIGRAMS SUBLINGUAL GIVEN AT 0833     INJECTION STARTED AT 0838,  HR 56 DURING SCAN, PROCEDURE COMPLETE    POST SCAN HR 58 /77 AT 0840, VERY MILD HEADACHE    PT TO HOLDING AREA  VS  HR 58 /80 AT 0844, HEADACHE RESOLVED; GIVEN WATER     AVS  PROVIDED      VS HR 55 /78 AT 0858, PT FEELS WELL    0906 DISCHARGED HOME

## 2024-05-07 NOTE — ASSESSMENT & PLAN NOTE
Hgba1c is 7.2 , goal is less than 7.0 ,  patient notes glucose is up at in the mornings.   He tried mandy crackers at night , it did not help .  Patient will walk , or use the treadmill after evening meal

## 2024-05-08 ENCOUNTER — NURSE ONLY (OUTPATIENT)
Dept: ALLERGY | Facility: CLINIC | Age: 69
End: 2024-05-08

## 2024-05-08 DIAGNOSIS — J30.89 ENVIRONMENTAL AND SEASONAL ALLERGIES: Primary | ICD-10-CM

## 2024-05-08 PROCEDURE — 95117 IMMUNOTHERAPY INJECTIONS: CPT | Performed by: ALLERGY & IMMUNOLOGY

## 2024-05-16 ENCOUNTER — APPOINTMENT (OUTPATIENT)
Dept: GENERAL RADIOLOGY | Age: 69
End: 2024-05-16
Attending: INTERNAL MEDICINE

## 2024-05-16 ENCOUNTER — LAB ENCOUNTER (OUTPATIENT)
Dept: LAB | Age: 69
End: 2024-05-16
Attending: INTERNAL MEDICINE

## 2024-05-16 DIAGNOSIS — R93.1 ABNORMAL ECHOCARDIOGRAM: Primary | ICD-10-CM

## 2024-05-16 LAB
ANION GAP SERPL CALC-SCNC: 6 MMOL/L (ref 0–18)
BASOPHILS # BLD AUTO: 0.04 X10(3) UL (ref 0–0.2)
BASOPHILS NFR BLD AUTO: 0.8 %
BUN BLD-MCNC: 19 MG/DL (ref 9–23)
BUN/CREAT SERPL: 18.3 (ref 10–20)
CALCIUM BLD-MCNC: 9.7 MG/DL (ref 8.7–10.4)
CHLORIDE SERPL-SCNC: 104 MMOL/L (ref 98–112)
CO2 SERPL-SCNC: 29 MMOL/L (ref 21–32)
CREAT BLD-MCNC: 1.04 MG/DL
DEPRECATED RDW RBC AUTO: 40.8 FL (ref 35.1–46.3)
EGFRCR SERPLBLD CKD-EPI 2021: 78 ML/MIN/1.73M2 (ref 60–?)
EOSINOPHIL # BLD AUTO: 0.09 X10(3) UL (ref 0–0.7)
EOSINOPHIL NFR BLD AUTO: 1.7 %
ERYTHROCYTE [DISTWIDTH] IN BLOOD BY AUTOMATED COUNT: 12.2 % (ref 11–15)
FASTING STATUS PATIENT QL REPORTED: NO
GLUCOSE BLD-MCNC: 192 MG/DL (ref 70–99)
HCT VFR BLD AUTO: 40.5 %
HGB BLD-MCNC: 14 G/DL
IMM GRANULOCYTES # BLD AUTO: 0.01 X10(3) UL (ref 0–1)
IMM GRANULOCYTES NFR BLD: 0.2 %
LYMPHOCYTES # BLD AUTO: 1.32 X10(3) UL (ref 1–4)
LYMPHOCYTES NFR BLD AUTO: 25.5 %
MCH RBC QN AUTO: 31.5 PG (ref 26–34)
MCHC RBC AUTO-ENTMCNC: 34.6 G/DL (ref 31–37)
MCV RBC AUTO: 91 FL
MONOCYTES # BLD AUTO: 0.4 X10(3) UL (ref 0.1–1)
MONOCYTES NFR BLD AUTO: 7.7 %
NEUTROPHILS # BLD AUTO: 3.31 X10 (3) UL (ref 1.5–7.7)
NEUTROPHILS # BLD AUTO: 3.31 X10(3) UL (ref 1.5–7.7)
NEUTROPHILS NFR BLD AUTO: 64.1 %
OSMOLALITY SERPL CALC.SUM OF ELEC: 295 MOSM/KG (ref 275–295)
PLATELET # BLD AUTO: 160 10(3)UL (ref 150–450)
POTASSIUM SERPL-SCNC: 4 MMOL/L (ref 3.5–5.1)
RBC # BLD AUTO: 4.45 X10(6)UL
SODIUM SERPL-SCNC: 139 MMOL/L (ref 136–145)
WBC # BLD AUTO: 5.2 X10(3) UL (ref 4–11)

## 2024-05-16 PROCEDURE — 36415 COLL VENOUS BLD VENIPUNCTURE: CPT

## 2024-05-16 PROCEDURE — 85025 COMPLETE CBC W/AUTO DIFF WBC: CPT

## 2024-05-16 PROCEDURE — 80048 BASIC METABOLIC PNL TOTAL CA: CPT

## 2024-05-24 ENCOUNTER — HOSPITAL ENCOUNTER (OUTPATIENT)
Dept: INTERVENTIONAL RADIOLOGY/VASCULAR | Facility: HOSPITAL | Age: 69
Discharge: HOME | End: 2024-05-24
Attending: INTERNAL MEDICINE | Admitting: INTERNAL MEDICINE

## 2024-05-24 VITALS
SYSTOLIC BLOOD PRESSURE: 159 MMHG | DIASTOLIC BLOOD PRESSURE: 90 MMHG | OXYGEN SATURATION: 95 % | TEMPERATURE: 97 F | HEART RATE: 58 BPM | RESPIRATION RATE: 14 BRPM | WEIGHT: 232 LBS | BODY MASS INDEX: 30.75 KG/M2 | HEIGHT: 73 IN

## 2024-05-24 DIAGNOSIS — R93.1 ABNORMAL CARDIAC CT ANGIOGRAPHY: ICD-10-CM

## 2024-05-24 DIAGNOSIS — R94.39 ABNORMAL NUCLEAR STRESS TEST: ICD-10-CM

## 2024-05-24 LAB
GLUCOSE BLDC GLUCOMTR-MCNC: 237 MG/DL (ref 70–99)
ISTAT ACTIVATED CLOTTING TIME: 282 SECONDS (ref 125–137)

## 2024-05-24 PROCEDURE — 93458 L HRT ARTERY/VENTRICLE ANGIO: CPT | Performed by: INTERNAL MEDICINE

## 2024-05-24 PROCEDURE — 36415 COLL VENOUS BLD VENIPUNCTURE: CPT

## 2024-05-24 PROCEDURE — 99152 MOD SED SAME PHYS/QHP 5/>YRS: CPT | Performed by: INTERNAL MEDICINE

## 2024-05-24 PROCEDURE — 85347 COAGULATION TIME ACTIVATED: CPT

## 2024-05-24 PROCEDURE — 99211 OFF/OP EST MAY X REQ PHY/QHP: CPT

## 2024-05-24 PROCEDURE — B2111ZZ FLUOROSCOPY OF MULTIPLE CORONARY ARTERIES USING LOW OSMOLAR CONTRAST: ICD-10-PCS | Performed by: INTERNAL MEDICINE

## 2024-05-24 PROCEDURE — 82962 GLUCOSE BLOOD TEST: CPT

## 2024-05-24 PROCEDURE — 027034Z DILATION OF CORONARY ARTERY, ONE ARTERY WITH DRUG-ELUTING INTRALUMINAL DEVICE, PERCUTANEOUS APPROACH: ICD-10-PCS | Performed by: INTERNAL MEDICINE

## 2024-05-24 PROCEDURE — 4A023N8 MEASUREMENT OF CARDIAC SAMPLING AND PRESSURE, BILATERAL, PERCUTANEOUS APPROACH: ICD-10-PCS | Performed by: INTERNAL MEDICINE

## 2024-05-24 RX ORDER — MIDAZOLAM HYDROCHLORIDE 1 MG/ML
INJECTION INTRAMUSCULAR; INTRAVENOUS
Status: COMPLETED
Start: 2024-05-24 | End: 2024-05-24

## 2024-05-24 RX ORDER — PRASUGREL 10 MG/1
10 TABLET, FILM COATED ORAL DAILY
Status: DISCONTINUED | OUTPATIENT
Start: 2024-05-25 | End: 2024-05-24

## 2024-05-24 RX ORDER — TRAMADOL HYDROCHLORIDE 50 MG/1
50 TABLET ORAL EVERY 6 HOURS PRN
Status: DISCONTINUED | OUTPATIENT
Start: 2024-05-24 | End: 2024-05-24

## 2024-05-24 RX ORDER — SODIUM CHLORIDE 9 MG/ML
3 INJECTION, SOLUTION INTRAVENOUS
Status: COMPLETED | OUTPATIENT
Start: 2024-05-24 | End: 2024-05-24

## 2024-05-24 RX ORDER — SODIUM CHLORIDE 9 MG/ML
INJECTION, SOLUTION INTRAVENOUS CONTINUOUS
Status: DISCONTINUED | OUTPATIENT
Start: 2024-05-24 | End: 2024-05-24

## 2024-05-24 RX ORDER — PRASUGREL 10 MG/1
TABLET, FILM COATED ORAL
Status: COMPLETED
Start: 2024-05-24 | End: 2024-05-24

## 2024-05-24 RX ORDER — HEPARIN SODIUM 1000 [USP'U]/ML
INJECTION, SOLUTION INTRAVENOUS; SUBCUTANEOUS
Status: COMPLETED
Start: 2024-05-24 | End: 2024-05-24

## 2024-05-24 RX ORDER — ASPIRIN 81 MG/1
TABLET, CHEWABLE ORAL
Status: COMPLETED
Start: 2024-05-24 | End: 2024-05-24

## 2024-05-24 RX ORDER — VERAPAMIL HYDROCHLORIDE 2.5 MG/ML
INJECTION, SOLUTION INTRAVENOUS
Status: COMPLETED
Start: 2024-05-24 | End: 2024-05-24

## 2024-05-24 RX ORDER — ASPIRIN 81 MG/1
324 TABLET, CHEWABLE ORAL ONCE
Status: COMPLETED | OUTPATIENT
Start: 2024-05-24 | End: 2024-05-24

## 2024-05-24 RX ORDER — NITROGLYCERIN 20 MG/100ML
INJECTION INTRAVENOUS
Status: COMPLETED
Start: 2024-05-24 | End: 2024-05-24

## 2024-05-24 RX ORDER — PRASUGREL 10 MG/1
10 TABLET, FILM COATED ORAL DAILY
Qty: 90 TABLET | Refills: 0 | Status: SHIPPED | OUTPATIENT
Start: 2024-05-25 | End: 2024-08-23

## 2024-05-24 RX ORDER — ACETAMINOPHEN 325 MG/1
650 TABLET ORAL EVERY 4 HOURS PRN
Status: DISCONTINUED | OUTPATIENT
Start: 2024-05-24 | End: 2024-05-24

## 2024-05-24 RX ORDER — TRAMADOL HYDROCHLORIDE 50 MG/1
100 TABLET ORAL EVERY 6 HOURS PRN
Status: DISCONTINUED | OUTPATIENT
Start: 2024-05-24 | End: 2024-05-24

## 2024-05-24 RX ORDER — ASPIRIN 81 MG/1
81 TABLET ORAL DAILY
Status: DISCONTINUED | OUTPATIENT
Start: 2024-05-25 | End: 2024-05-24

## 2024-05-24 RX ORDER — LIDOCAINE HYDROCHLORIDE 20 MG/ML
INJECTION, SOLUTION EPIDURAL; INFILTRATION; INTRACAUDAL; PERINEURAL
Status: COMPLETED
Start: 2024-05-24 | End: 2024-05-24

## 2024-05-24 RX ADMIN — ASPIRIN 324 MG: 81 TABLET, CHEWABLE ORAL at 08:26:00

## 2024-05-24 RX ADMIN — SODIUM CHLORIDE 3 ML/KG/HR: 9 INJECTION, SOLUTION INTRAVENOUS at 08:26:00

## 2024-05-24 NOTE — CARDIAC REHAB
Cardiac Rehab Phase I    Activity:  Post cath bedrest    Education:  Handouts provided and reviewed: [facility] Your [facility] Booklet.       Diet: Healthy Cardiac diet reviewed.    Disease Process: Disease process reviewed.    Reviewed the following: SITE CARE: reviewed      RISK FACTORS: reviewed       SMOKING CESSATION: reviewed      HOME EXERCISE ACTIVITY: reviewed. starting a walking program prior to starting CR. Has treadmill at home.      OUTPATIENT CARDIAC REHAB: Referred to Cardiac Rehabilitation, phase 2.  Plans on attending.  Pt will call to schedule orientation.

## 2024-05-24 NOTE — DISCHARGE INSTRUCTIONS
TRANSRADIAL DISCHARGE INSTRUCTION  HOME CARE INSTRUCTIONS FOLLOWING CORONARY ANGIOGRAPHY,  INSERTION OF STENT IN THE CORONARY    Activity  DO NOT drive after the procedure.  You may resume driving late the following day according to the nurse or physician's instructions  Plan on resting and relaxing tonight and tomorrow  Resume your normal activity after 48 hours, or as instructed by your physician  Avoid drinking alcohol for the next 24 hours  Avoid wrist flexion, extension, and fine motor activities (i.e. texting, typing, using computer mouse, etc.) for 24 hours  Do not lift or pull anything heavier than 5 to 8  pounds with affected hand for 1 week    What is Normal?  A small lump at the procedure site associated with mild tenderness when touched  The procedure site may be bruised or discolored  There may be a small amount of drainage on the bandage    Special Instructions   Drink plenty of fluids during the next 24 hours to \"flush\" the contrast from your system  Keep the bandage clean and dry  After 24 hours, you must remove the bandage. Do not put ointment, powders, or creams to site.  You can shower after removing the bandage, and wash the procedure site gently with soap and water  DO NOT submerge the procedure site for 1 week (no bath tubs or pools)  If you choose to wear a bandage for a few days, make sure it remains clean and dry and that it is changed daily  For local swelling: apply ice  Bleeding can occur at the procedure site - both on the outside of the skin and/or beneath the surface of the skin        If bleeding occurs: Elevate hand above heart and apply local pressure  Swelling or a large lump at the procedure site can occur, which may be accompanied by moderate to severe pain  If the bleeding does not stop, call 911 and continue to apply pressure    When to contact physician:   Swelling, pain, or bleeding at the site that is not relieved by applying ice or pressure  Signs of infection: Redness,  warmth, drainage at the site, chills, or temperature of 100.5 or greater  Changes in sensation, numbness, or tingling of affected hand    You Received a Stent:    You will remain on an antiplatelet drug and/or aspirin.  Antiplatelet medications are usually taken for six months to one year and should not be stopped unless your cardiologist directs you to do so.  These medications help to prevent blockage at the stent site.  If another physician or dentist asks you to stop your antiplatelet medication, you need to consult your cardiologist first.  Together, your cardiologist and other physician can discuss the risks that may be involved if you are not taking the antiplatelet medication   If an MRI is necessary, it may be done 4-6 weeks after your procedure.  Verify this with your cardiologist  Keep your stent card with you at all times!  If you need an MRI in the future, your stent card will need to be shown to the technologist before performing the MRI.  A duplicate card CANNOT be reproduced.    Other    You may resume your present diet, unless otherwise specified by your physician.  A list of your medications was provided to you at discharge.        **If you have any question or concern, please call the on-call nurse at [phone]

## 2024-05-24 NOTE — DIETARY NOTE
NUTRITION EDUCATION NOTE     Received consult for cardiac nutrition education. Verbally reviewed basic cardiac diet restrictions and in-depth review of basic diabetic diet recommendations. Provided with Eating Heart-Healthy and NCM handout to reinforce. Very receptive to instructions. Pt states he is very disciplined with DM diet at home. He is interested in meeting with an outpatient dietitian to further discuss his needs and recommendations. Would benefit from outpt f/u. Expect good compliance. Clinical and outpatient RD contact information provided to pt.        Tashia Milligan RD, LDN  Clinical Dietitian  P: [phone]

## 2024-05-24 NOTE — IVS NOTE
Tolerated procedure well.  IV NS running at 50cc/hr.  TR Band intact right wrist, with 12cc air clean and dry.  Dr. Velasquez at bedside to speak with patient and family.  Tolerated PO food and fluids.  Cardiac Rehab RN and Dietician here to speak with patient and family.  Up to bathroom to void.  Air removed from TR Band in increments and band removed. Tegaderm dressing and armboard applied.  Ambulated in the hallway.  RX for Effient at pharmacy.  Discharge and follow-up instructions given.  Discharged home per W/c,VSS  Right wrist site clean and dry, no bleeding noted, sl swelling noted distal to wrist. Denies pain.

## 2024-05-24 NOTE — INTERVAL H&P NOTE
Pre-op Diagnosis: * No pre-op diagnosis entered *    The above referenced H&P was reviewed by Brandon Velasquez MD on 5/24/2024, the patient was examined and no significant changes have occurred in the patient's condition since the H&P was performed.  I discussed with the patient and/or legal representative the potential benefits, risks and side effects of this procedure; the likelihood of the patient achieving goals; and potential problems that might occur during recuperation.  I discussed reasonable alternatives to the procedure, including risks, benefits and side effects related to the alternatives and risks related to not receiving this procedure.  We will proceed with procedure as planned.

## 2024-05-24 NOTE — PROCEDURES
[facility]  part of [facility]    Cardiac Cath Procedure Note  Jose Simon  Patient Status:  Outpatient    7/10/1955 MRN [id number]   Location [facility] INTERVENTIONAL SUITES Attending Brandon Velasquez MD    Day # 0 PCP Sincere Greenberg MD       Cardiologist: Brandon Velasquez MD  Primary Proceduralist: Brandon Velasquez MD  Procedure Performed: LHC, COR, and LV, PCI LAD  Date of Procedure: 2024   Indication: Abnormal CT coronary angiogram    Summary of procedure:    Mid/distal LAD stenosis 80% status post PCI with YARELI x 1      Recommendations:  DAPT: Aspirin and Effient  No changes to statins at this time  Discharge home in 6 hours      Left Ventriculography and hemodynamics:   LV EF not done  LV EDP 12 mmHg  No gradient across aortic valve        Coronary Angiography  RCA:  Dominant and free of obstructive disease, supplies PDA and PL    Left main:  Free of obstructive disease    Left anterior descending: Mild proximal vessel disease however mid/distal segment with 80% long tubular stenosis.  Remainder the LAD is free of obstructive disease, supplies multiple diagonals which are non-obstructive    Circumflex:  Free of obstructive disease, supplies multiple OM branches which are patent      LAD intervention  Lesion Characteristics-mildly torturous, not calcified.  Type non-C lesion.  Pre-intervention stenosis 80%, Post intervention stenosis 0%.  Pre OLMAN 3, Post OLMAN 3.      Guide Catheter: EBU 3.5  Wire: Jami blue  Pre-dil: None  Stent: Synergy 3.0 x 28 mm YARELI  Post-dil: None        Summary of Case: After written informed consent was obtained from the patient, patient was brought to the cardiac catheterization laboratory.  Patient was prepped and draped in the usual sterile fashion. Lidocaine 1% was used to infiltrate the right radial artery for local anesthesia and a 6 Northern Irish introducer sheath was inserted into the right radial artery.      Selective coronary angiography performed  with JR4 catheter for RCA and JL3.5 catheter for LCA.  Angiography performed in standard projections.      6 Upper sorbian JR4 catheter placed in LV for hemodynamics.    Specimen sent to: No specimen collected  Estimated blood loss: 10 cc  Closure:  TR band      IV was maintained by RN and moderate conscious sedation of versed and fentanyl was given.  Patient was assessed and monitoring of oxygen, heart rate and blood pressure by nurse and myself during the exam 35 minutes.      Brandon Velasquez MD  05/24/24

## 2024-05-25 RX ORDER — ATORVASTATIN CALCIUM 40 MG/1
40 TABLET, FILM COATED ORAL NIGHTLY
Qty: 90 TABLET | Refills: 3 | Status: SHIPPED | OUTPATIENT
Start: 2024-05-25

## 2024-05-25 NOTE — TELEPHONE ENCOUNTER
REFILL PASSED PER [facility] PROTOCOLS    Requested Prescriptions   Pending Prescriptions Disp Refills    ATORVASTATIN 40 MG Oral Tab [Pharmacy Med Name: Atorvastatin Calcium 40 Mg Tab Nort] 90 tablet 0     Sig: Take 1 tablet (40 mg total) by mouth nightly.       Cholesterol Medication Protocol Passed - 5/22/2024  2:32 AM        Passed - ALT < 80     Lab Results   Component Value Date    ALT 27 05/03/2024             Passed - ALT resulted within past year        Passed - Lipid panel within past 12 months     Lab Results   Component Value Date    CHOLEST 126 05/03/2024    TRIG 57 05/03/2024    HDL 53 05/03/2024    LDL 60 05/03/2024    VLDL 8 05/03/2024    TCHDLRATIO 3.5 05/07/2015    NONHDLC 73 05/03/2024             Passed - In person appointment or virtual visit in the past 12 mos or appointment in next 3 mos     Recent Outpatient Visits              2 weeks ago Environmental and seasonal allergies [J30.89]    [facility] [place]    Nurse Only    2 weeks ago Diabetes mellitus without complication (HCC)    [facility] Since[facility] Green[facility]    Telemedicine    1 month ago Environmental and seasonal allergies    [facility]    Nurse Only    2 months ago Diabetes mellitus without complication (HCC)    [facility] Sincere Greenberg MD    Telemedicine    2 months ago Environmental and seasonal allergies [J30.89]    [facility]    Nurse Only          Future Appointments         Provider Department Appt Notes    [facility]     [facility]     [facility]     In 3 months EC [facility]ER[facility]  [place], [place]     In 4 months EC ALLERGY [facility] [place]     In 5 months EC ALLERGY [facility] [place]     In 5 months EC ALLERGY [facility] [place]     In 6 months EC ALLERGY [facility] [place]                          Future Appointments         Provider Department Appt Notes    In 1 week EC ALLERGY [facility] [place]     In 1 month EC [facility]ERGY [facility] [place]     In 2 months EC ALLERGY [facility] [place]     In 3 months EC ALLERGY [facility] [place]     In 4 months EC ALLERGY [facility] [place]     In 5 months EC ALLERGY [facility] [place]     In 5 months EC ALLERGY [facility] [place]     In 6 months EC [facility]ERGY [facility] [place]           Recent Outpatient Visits              2 weeks ago Environmental and seasonal allergies [J30.89]    [facility] [place]    Nurse Only    2 weeks ago Diabetes mellitus without complication (HCC)    [facility] Sincere Greenberg MD    Telemedicine    1 month ago Environmental and seasonal allergies    [facility] [place]    Nurse Only    2 months ago Diabetes mellitus without complication (HCC)    [facility] Sincere Greenberg MD    Telemedicine    2 months ago Environmental and seasonal allergies [J30.89]    [facility], [place]    Nurse Only

## 2024-05-29 ENCOUNTER — TELEPHONE (OUTPATIENT)
Dept: INTERNAL MEDICINE CLINIC | Facility: CLINIC | Age: 69
End: 2024-05-29

## 2024-06-04 ENCOUNTER — NURSE ONLY (OUTPATIENT)
Dept: ALLERGY | Facility: CLINIC | Age: 69
End: 2024-06-04

## 2024-06-04 DIAGNOSIS — J30.89 ENVIRONMENTAL AND SEASONAL ALLERGIES: Primary | ICD-10-CM

## 2024-06-04 PROCEDURE — 95117 IMMUNOTHERAPY INJECTIONS: CPT | Performed by: ALLERGY & IMMUNOLOGY

## 2024-06-05 ENCOUNTER — ORDER TRANSCRIPTION (OUTPATIENT)
Dept: CARDIAC REHAB | Facility: HOSPITAL | Age: 69
End: 2024-06-05

## 2024-06-05 DIAGNOSIS — Z95.820 S/P ANGIOPLASTY WITH STENT: Primary | ICD-10-CM

## 2024-06-10 ENCOUNTER — APPOINTMENT (OUTPATIENT)
Dept: CARDIAC REHAB | Facility: HOSPITAL | Age: 69
End: 2024-06-10
Attending: INTERNAL MEDICINE
Payer: MEDICARE

## 2024-06-12 NOTE — TELEPHONE ENCOUNTER
Please review. Protocol Failed; No Protocol    Requested Prescriptions   Pending Prescriptions Disp Refills    ondansetron (ZOFRAN) 4 mg tablet [Pharmacy Med Name: Ondansetron Hydrochloride 4 Mg Tab Nort] 10 tablet 0     Sig: Take 1 tablet (4 mg total) by mouth every 8 (eight) hours as needed for Nausea.       There is no refill protocol information for this order            Future Appointments         Provider Department Appt Notes    In 2 weeks [facility]     In 3 weeks Bekah Ng APRN formerly [facility] Diabetes    In 1 month [facility]     In 2 months [facility]     In 2 months Sincere Greenberg MD [facility] [place] Annual physical    In 3 months [facility]     In 4 months [facility]     In 5 months [facility]     In 6 months [facility]           Recent Outpatient Visits              1 week ago Environmental and seasonal allergies [J30.89]    [facility]    Nurse Only    1 month ago Environmental and seasonal allergies [J30.89]    [facility]    Nurse Only    1 month ago Diabetes mellitus without complication (HCC)    [facility] [place] Sincere Greenberg MD    Telemedicine    2 months ago Environmental and seasonal allergies    [facility]    Nurse Only    2 months ago Diabetes mellitus without complication (HCC)    [facility] [place] Dionicio  MD Sincere    Telemedicine

## 2024-06-13 RX ORDER — ONDANSETRON 4 MG/1
4 TABLET, FILM COATED ORAL EVERY 8 HOURS PRN
Qty: 10 TABLET | Refills: 2 | Status: SHIPPED | OUTPATIENT
Start: 2024-06-13

## 2024-06-21 ENCOUNTER — ORDER TRANSCRIPTION (OUTPATIENT)
Dept: CARDIAC REHAB | Facility: HOSPITAL | Age: 69
End: 2024-06-21

## 2024-06-21 DIAGNOSIS — Z95.820 S/P ANGIOPLASTY WITH STENT: Primary | ICD-10-CM

## 2024-06-25 ENCOUNTER — CARDPULM VISIT (OUTPATIENT)
Dept: CARDIAC REHAB | Facility: HOSPITAL | Age: 69
End: 2024-06-25
Attending: INTERNAL MEDICINE
Payer: MEDICARE

## 2024-06-25 DIAGNOSIS — Z95.820 S/P ANGIOPLASTY WITH STENT: Primary | ICD-10-CM

## 2024-06-28 ENCOUNTER — APPOINTMENT (OUTPATIENT)
Dept: URBAN - METROPOLITAN AREA CLINIC 244 | Age: 69
Setting detail: DERMATOLOGY
End: 2024-07-03

## 2024-06-28 DIAGNOSIS — L81.4 OTHER MELANIN HYPERPIGMENTATION: ICD-10-CM

## 2024-06-28 DIAGNOSIS — L82.1 OTHER SEBORRHEIC KERATOSIS: ICD-10-CM

## 2024-06-28 DIAGNOSIS — M71 OTHER BURSOPATHIES: ICD-10-CM

## 2024-06-28 DIAGNOSIS — D22 MELANOCYTIC NEVI: ICD-10-CM

## 2024-06-28 PROBLEM — M71.341 OTHER BURSAL CYST, RIGHT HAND: Status: ACTIVE | Noted: 2024-06-28

## 2024-06-28 PROBLEM — D23.72 OTHER BENIGN NEOPLASM OF SKIN OF LEFT LOWER LIMB, INCLUDING HIP: Status: ACTIVE | Noted: 2024-06-28

## 2024-06-28 PROBLEM — D22.71 MELANOCYTIC NEVI OF RIGHT LOWER LIMB, INCLUDING HIP: Status: ACTIVE | Noted: 2024-06-28

## 2024-06-28 PROBLEM — D22.5 MELANOCYTIC NEVI OF TRUNK: Status: ACTIVE | Noted: 2024-06-28

## 2024-06-28 PROCEDURE — 99213 OFFICE O/P EST LOW 20 MIN: CPT

## 2024-06-28 PROCEDURE — OTHER ADDITIONAL NOTES: OTHER

## 2024-06-28 PROCEDURE — OTHER COUNSELING: OTHER

## 2024-06-28 ASSESSMENT — LOCATION ZONE DERM
LOCATION ZONE: TOE
LOCATION ZONE: TRUNK
LOCATION ZONE: FINGER

## 2024-06-28 ASSESSMENT — LOCATION DETAILED DESCRIPTION DERM
LOCATION DETAILED: RIGHT LATERAL PLANTAR 3RD TOE
LOCATION DETAILED: PERIUMBILICAL SKIN
LOCATION DETAILED: RIGHT INDEX PROXIMAL INTERPHALANGEAL JOINT

## 2024-06-28 ASSESSMENT — LOCATION SIMPLE DESCRIPTION DERM
LOCATION SIMPLE: RIGHT INDEX FINGER
LOCATION SIMPLE: PLANTAR SURFACE OF RIGHT 3RD TOE
LOCATION SIMPLE: ABDOMEN

## 2024-07-02 ENCOUNTER — NURSE ONLY (OUTPATIENT)
Dept: ALLERGY | Facility: CLINIC | Age: 69
End: 2024-07-02
Payer: MEDICARE

## 2024-07-02 ENCOUNTER — CARDPULM VISIT (OUTPATIENT)
Dept: CARDIAC REHAB | Facility: HOSPITAL | Age: 69
End: 2024-07-02
Attending: INTERNAL MEDICINE
Payer: MEDICARE

## 2024-07-02 DIAGNOSIS — J30.89 ENVIRONMENTAL AND SEASONAL ALLERGIES: Primary | ICD-10-CM

## 2024-07-02 PROCEDURE — 95117 IMMUNOTHERAPY INJECTIONS: CPT | Performed by: ALLERGY & IMMUNOLOGY

## 2024-07-02 PROCEDURE — 93798 PHYS/QHP OP CAR RHAB W/ECG: CPT

## 2024-07-05 ENCOUNTER — OFFICE VISIT (OUTPATIENT)
Dept: ENDOCRINOLOGY CLINIC | Facility: CLINIC | Age: 69
End: 2024-07-05
Payer: MEDICARE

## 2024-07-05 VITALS
HEIGHT: 74 IN | HEART RATE: 57 BPM | BODY MASS INDEX: 29.65 KG/M2 | WEIGHT: 231 LBS | SYSTOLIC BLOOD PRESSURE: 122 MMHG | DIASTOLIC BLOOD PRESSURE: 70 MMHG

## 2024-07-05 DIAGNOSIS — E11.9 DIABETES MELLITUS WITHOUT COMPLICATION (HCC): Primary | ICD-10-CM

## 2024-07-05 LAB
CARTRIDGE EXPIRATION DATE: ABNORMAL DATE
GLUCOSE BLOOD: 229
HEMOGLOBIN A1C: 7.6 % (ref 4.3–5.6)
TEST STRIP LOT #: NORMAL NUMERIC

## 2024-07-05 PROCEDURE — 82947 ASSAY GLUCOSE BLOOD QUANT: CPT

## 2024-07-05 PROCEDURE — 83036 HEMOGLOBIN GLYCOSYLATED A1C: CPT

## 2024-07-05 PROCEDURE — 99204 OFFICE O/P NEW MOD 45 MIN: CPT

## 2024-07-05 RX ORDER — TIRZEPATIDE 2.5 MG/.5ML
2.5 INJECTION, SOLUTION SUBCUTANEOUS WEEKLY
Qty: 2 ML | Refills: 2 | Status: SHIPPED | OUTPATIENT
Start: 2024-07-05

## 2024-07-05 NOTE — PROGRESS NOTES
Name: Jose Simon Jr.  Date: 7/5/2024    Referring Physician: Sincere Greenberg    HISTORY OF PRESENT ILLNESS   Jose Simon Jr. is a 68 year old male who presents for consult for diabetes mellitus     Diabetes History:  Diagnosed- >20 years ago   Patient has not had hospitalizations for blood sugar issues    Prior glycohemoglobin were 7.2% 5/2024; 7.6% POC today   Glucose in clinic today - 229 mg/dl     Dietary compliance: Good watches diet closely and follows low CHO diet- counts carbs daily and generally eating between  grams of carbs daily rarely eating fast food and if he does will get salad. Avoids pasta and rice.      Recall:  Breakfast- power bar (9gm), or yogurt with cheese (babybel), or occ eggs with 2 pieces of wheat toast   Lunch- beef jerkey sticks with cheese, or sandwich on small zuri (low CHO), with low sodium turkey with cheese   Dinner- salad 4-5 times weekly, low CHO, sugar free dressing. Low CHO vegetables with salad and cheese, or fish 1-2 times weekly, occ. Pork or beef, chicken with sauce.   Snack- crispy pastry ( 4-5 pieces, 20 gram of carb), or protein drink (low carb), fruit,   Beverages- water with flavoring, 1-2 diet soda, coffee- decaf     Exercise: Yes- watching [employer], [employer] in summer months, volunteers at [employer] a lot of walking, treadmill in winter months 5 days weekly     Polyuria/polydipsia: No   Blurred vision: No    Episodes of hypoglycemia: No, symptoms in low 100's   Blood Glucose:  Checking 2-3 times per day  Logs scanned to chart: below are readings for last 1-2 months    Fasting- 179, 196, 236, 157, 199, 201, 231, 197, 199, 163, 173, 195, 172, 212, 205, 238, 183, 194, 183, 194, 183, 183, 150, 199, 191, 153, 202, 149, 143    Afternoon/Evening- 153, 143, 108, 169, 128, 145, 221, 143, 186, 128, 158, 116, 166    Bedtime- 102, 85, 101, 88, 99, 133    Current DM Regimen:  Glimepiride 4 mg PO BID  Metformin 2000 mg with dinner meal       Modifying factors:  Medication  adherence: Yes   Recent steroids, illness or infections: No       REVIEW OF SYSTEMS  Eyes: Diabetic retinopathy present: No            Most recent visit to eye doctor in last 12 months: Yes- Dr. Carpenter 9/2023    CV: Cardiovascular disease present: Yes- s/p stent 5/2024         Hypertension present: Yes, well controlled          Hyperlipidemia present: No         Peripheral Vascular Disease present: No    : Nephropathy present: Yes    Neuro: Neuropathy present: Yes- numbness in toes     Skin: Infection or ulceration: No    Osteoporosis: No    Thyroid disease: No      Medications:     Current Outpatient Medications:     atorvastatin 40 MG Oral Tab, Take 1 tablet (40 mg total) by mouth nightly., Disp: 90 tablet, Rfl: 3    carvedilol 6.25 MG Oral Tab, Take 1 tablet (6.25 mg total) by mouth 2 (two) times daily with meals., Disp: , Rfl:     glimepiride 4 MG Oral Tab, Take 1 tablet (4 mg total) by mouth every morning before breakfast AND 1 tablet (4 mg total) before evening meal., Disp: 180 tablet, Rfl: 1    losartan-hydroCHLOROthiazide 100-25 MG Oral Tab, Take 1 tablet by mouth daily., Disp: 90 tablet, Rfl: 3    metFORMIN  MG Oral Tablet 24 Hr, Take 4 tablets (2,000 mg total) by mouth every evening., Disp: 360 tablet, Rfl: 3    ondansetron (ZOFRAN) 4 mg tablet, Take 1 tablet (4 mg total) by mouth every 8 (eight) hours as needed for Nausea., Disp: 10 tablet, Rfl: 2    ALPRAZolam (XANAX) 0.5 MG Oral Tab, Take one an hour prior to flying and repeat if necessary for anxiety ., Disp: 10 tablet, Rfl: 0    PRASUGREL HCL OR, , Disp: , Rfl:     Probiotic Product (PROBIOTIC-10 OR), Probiotic 10 billion cell capsule, [RxNorm: 1713492], Disp: , Rfl:     prasugrel 10 MG Oral Tab, Take 1 tablet (10 mg total) by mouth daily., Disp: 90 tablet, Rfl: 0    levocetirizine 5 MG Oral Tab, TAKE ONE TABLET BY MOUTH NIGHTLY, Disp: 90 tablet, Rfl: 1    montelukast 10 MG Oral Tab, Take 1 tablet (10 mg total) by mouth nightly., Disp: 90  tablet, Rfl: 3    tamsulosin 0.4 MG Oral Cap, Take 1 capsule (0.4 mg total) by mouth nightly. TAKE AT BEDTIME, Disp: 90 capsule, Rfl: 3    sertraline 50 MG Oral Tab, Take 1 tablet (50 mg total) by mouth daily., Disp: 90 tablet, Rfl: 3    traMADol 50 MG Oral Tab, TAKE ONE TABLET BY MOUTH EVERY SIX HOURS AS NEEDED FOR PAIN, Disp: 50 tablet, Rfl: 5    albuterol (PROAIR HFA) 108 (90 Base) MCG/ACT Inhalation Aero Soln, Inhale 2 puffs into the lungs every 4 to 6 hours as needed for Wheezing., Disp: 3 each, Rfl: 3    zolpidem 5 MG Oral Tab, Take 1 tablet (5 mg total) by mouth nightly as needed for Sleep., Disp: 20 tablet, Rfl: 0    SUMAtriptan Succinate 100 MG Oral Tab, Take 1 tab as needed for headache, may repeat in 2 hours, maximum 2 tabs in 24 hours., Disp: 9 tablet, Rfl: 2    pseudoephedrine  MG Oral Tablet 12 Hr, Take 1 tablet (120 mg total) by mouth every 12 (twelve) hours., Disp: 60 tablet, Rfl: 3    LORATADINE 10 MG Oral Tab, TAKE ONE TABLET BY MOUTH ONE TIME DAILY  (Patient taking differently: No sig reported), Disp: 90 tablet, Rfl: 2    aspirin 81 MG Oral Tab, Take 1 tablet (81 mg total) by mouth daily., Disp: , Rfl:     Multiple Vitamins-Minerals (MULTIVITAMIN ADULT OR), Take 1 tablet by mouth daily., Disp: , Rfl:     Glucose Blood (SEPIDEH CONTOUR TEST) In Vitro Strip, Test twice a day, Disp: 100 each, Rfl: 11    SEPIDEH MICROLET LANCETS Does not apply Misc, Test twice a day, Disp: 100 each, Rfl: 11    ALPRAZolam 0.5 MG Oral Tab, Take 1 tablet (0.5 mg total) by mouth daily., Disp: , Rfl:     Omega-3 Fatty Acids (OMEGA-3 FISH OIL) 1200 MG Oral Cap, Take  by mouth., Disp: , Rfl:      Allergies:   Allergies   Allergen Reactions    Cats Claw, Uncaria Tomentosa ITCHING     Dander     Mold Runny nose    Ragweed ITCHING       Social History:   Social History     Socioeconomic History    Marital status:    Tobacco Use    Smoking status: Never    Smokeless tobacco: Never   Vaping Use    Vaping status: Never  Used   Substance and Sexual Activity    Alcohol use: Not Currently     Alcohol/week: 0.0 standard drinks of alcohol     Comment: socially    Drug use: No       Medical History:   Past Medical History:    Allergic rhinitis    Asthma (HCC)    Back problem    Degenerative cervical disc    Diabetes (HCC)    Essential hypertension    Lipid screening    Migraine headache    Migraines    Peptic ulcer disease    Seasonal allergies    Visual impairment    glasses       Surgical history:   Past Surgical History:   Procedure Laterality Date    Colonoscopy  2012    repeat in 2017    Colonoscopy  2017    repeat 2024    Colonoscopy N/A 4/18/2024    Procedure: COLONOSCOPY;  Surgeon: Florin Corey MD;  Location: [facility] MAIN OR    Vasectomy           PHYSICAL EXAM  Vitals:    07/05/24 0819   BP: 122/70   Pulse: 57   Weight: 231 lb (104.8 kg)   Height: 6' 2\" (1.88 m)       General Appearance:  alert, well developed, in no acute distress  Eyes:  normal conjunctivae, sclera, and normal pupils  Neck: Trachea midline: Normal  Back: no kyphosis or back tenderness  Lymph Nodes:  No abnormal nodes noted  Musculoskeletal:  normal muscle strength and tone  Skin:  normal moisture and skin texture  Hair & Nails:  normal scalp hair     Hematologic:  no excessive bruising  Neuro:  sensory grossly intact and motor grossly intact.  Psychiatric:  oriented to time, self, and place  Nutritional:  no abnormal weight gain or loss      ASSESSMENT/PLAN:    Diabetes mellitus type 2 uncontrolled  -HgA1c - 7.6% POC today   -Reviewed ABC's of diabetes   - Reviewed pathogenesis of diabetes.   - Reviewed importance of good glycemic control to prevent microvascular and macrovascular complications including nephropathy, neuropathy, retinopathy, and cardiovascular disease.  - Reviewed importance of SBGM- check glucose 1-2 times daily   - Reviewed target glucose goals for patient  fasting and <180 post prandially   - Reviewed importance of following  diabetic diet- recommended 135 grams of CHO per day or 45 grams per meal.   - Provided patient education materials    - Reviewed diet at length and agree overall he is doing very well with low CHO diet, incorporates while grains, higher fiber carbs and avoids starches. Avoids sweetened beverages. Is also choosing lower fat/sodium options recently after having stent placed a month ago. Encouraged him to continue with diet and reviewed carb counting.     - Continue Metformin 2000 mg PO daily.     - Stop Glimepiride     - Start Mounjaro 2.5 mg subcutaneous weekly x 4 weeks and then increase to 5 mg subcutaneous weekly. Reviewed side effects and risks vs benefits of medication and reviewed pen injection. Reviewed cardiac benefit.     - Continue to check sugars 2-3 times daily- send readings in 3-4 weeks via [employer].     - normotensive   - Lipids 5/2024- at goal, on statin  - + nephropathy- reviewed lab with patient. Reviewed importance of good glycemic control and he is on losartan   - UTD with optho - sees yearly   - Follows with podiatry, but overdue for follow up - last seen 4/2023- he will schedule appt.       RTC in 2 months   7/5/2024  BERNARD Cage    A total of  35 minutes was spent on obtaining history, reviewing pertinent imaging/labs and specialists notes, evaluating patient, providing multiple treatment options, reinforcing diet/exercise and compliance, and completing documentation.

## 2024-07-05 NOTE — PATIENT INSTRUCTIONS
Start Mounjaro 2.5 mg subcutaneous weekly x 4 weeks.     Stop Glimepiride     Continue metformin 500mg - 4 tablets with dinner.

## 2024-07-08 ENCOUNTER — MED REC SCAN ONLY (OUTPATIENT)
Dept: INTERNAL MEDICINE CLINIC | Facility: CLINIC | Age: 69
End: 2024-07-08

## 2024-07-09 ENCOUNTER — CARDPULM VISIT (OUTPATIENT)
Dept: CARDIAC REHAB | Facility: HOSPITAL | Age: 69
End: 2024-07-09
Attending: INTERNAL MEDICINE
Payer: MEDICARE

## 2024-07-09 PROCEDURE — 93798 PHYS/QHP OP CAR RHAB W/ECG: CPT

## 2024-07-11 ENCOUNTER — CARDPULM VISIT (OUTPATIENT)
Dept: CARDIAC REHAB | Facility: HOSPITAL | Age: 69
End: 2024-07-11
Attending: INTERNAL MEDICINE
Payer: MEDICARE

## 2024-07-11 PROCEDURE — 93798 PHYS/QHP OP CAR RHAB W/ECG: CPT

## 2024-07-16 ENCOUNTER — CARDPULM VISIT (OUTPATIENT)
Dept: CARDIAC REHAB | Facility: HOSPITAL | Age: 69
End: 2024-07-16
Attending: INTERNAL MEDICINE
Payer: MEDICARE

## 2024-07-16 PROCEDURE — 93798 PHYS/QHP OP CAR RHAB W/ECG: CPT

## 2024-07-18 ENCOUNTER — CARDPULM VISIT (OUTPATIENT)
Dept: CARDIAC REHAB | Facility: HOSPITAL | Age: 69
End: 2024-07-18
Attending: INTERNAL MEDICINE
Payer: MEDICARE

## 2024-07-18 PROCEDURE — 93798 PHYS/QHP OP CAR RHAB W/ECG: CPT

## 2024-07-23 ENCOUNTER — CARDPULM VISIT (OUTPATIENT)
Dept: CARDIAC REHAB | Facility: HOSPITAL | Age: 69
End: 2024-07-23
Attending: INTERNAL MEDICINE
Payer: MEDICARE

## 2024-07-23 PROCEDURE — 93798 PHYS/QHP OP CAR RHAB W/ECG: CPT

## 2024-07-25 ENCOUNTER — CARDPULM VISIT (OUTPATIENT)
Dept: CARDIAC REHAB | Facility: HOSPITAL | Age: 69
End: 2024-07-25
Attending: INTERNAL MEDICINE
Payer: MEDICARE

## 2024-07-25 PROCEDURE — 93798 PHYS/QHP OP CAR RHAB W/ECG: CPT

## 2024-07-30 ENCOUNTER — CARDPULM VISIT (OUTPATIENT)
Dept: CARDIAC REHAB | Facility: HOSPITAL | Age: 69
End: 2024-07-30
Attending: INTERNAL MEDICINE
Payer: MEDICARE

## 2024-07-30 ENCOUNTER — NURSE ONLY (OUTPATIENT)
Dept: ALLERGY | Facility: CLINIC | Age: 69
End: 2024-07-30

## 2024-07-30 DIAGNOSIS — J30.89 ENVIRONMENTAL AND SEASONAL ALLERGIES: Primary | ICD-10-CM

## 2024-07-30 PROCEDURE — 95117 IMMUNOTHERAPY INJECTIONS: CPT | Performed by: ALLERGY & IMMUNOLOGY

## 2024-07-30 PROCEDURE — 93798 PHYS/QHP OP CAR RHAB W/ECG: CPT

## 2024-08-01 ENCOUNTER — CARDPULM VISIT (OUTPATIENT)
Dept: CARDIAC REHAB | Facility: HOSPITAL | Age: 69
End: 2024-08-01
Attending: INTERNAL MEDICINE
Payer: MEDICARE

## 2024-08-01 PROCEDURE — 93798 PHYS/QHP OP CAR RHAB W/ECG: CPT

## 2024-08-06 ENCOUNTER — CARDPULM VISIT (OUTPATIENT)
Dept: CARDIAC REHAB | Facility: HOSPITAL | Age: 69
End: 2024-08-06
Attending: INTERNAL MEDICINE
Payer: MEDICARE

## 2024-08-06 PROCEDURE — 93798 PHYS/QHP OP CAR RHAB W/ECG: CPT

## 2024-08-13 ENCOUNTER — CARDPULM VISIT (OUTPATIENT)
Dept: CARDIAC REHAB | Facility: HOSPITAL | Age: 69
End: 2024-08-13
Attending: INTERNAL MEDICINE
Payer: MEDICARE

## 2024-08-13 PROCEDURE — 93798 PHYS/QHP OP CAR RHAB W/ECG: CPT

## 2024-08-15 ENCOUNTER — CARDPULM VISIT (OUTPATIENT)
Dept: CARDIAC REHAB | Facility: HOSPITAL | Age: 69
End: 2024-08-15
Attending: INTERNAL MEDICINE
Payer: MEDICARE

## 2024-08-15 PROCEDURE — 93798 PHYS/QHP OP CAR RHAB W/ECG: CPT

## 2024-08-20 ENCOUNTER — CARDPULM VISIT (OUTPATIENT)
Dept: CARDIAC REHAB | Facility: HOSPITAL | Age: 69
End: 2024-08-20
Attending: INTERNAL MEDICINE
Payer: MEDICARE

## 2024-08-20 PROCEDURE — 93798 PHYS/QHP OP CAR RHAB W/ECG: CPT

## 2024-08-20 RX ORDER — METFORMIN HCL 500 MG
2000 TABLET, EXTENDED RELEASE 24 HR ORAL EVERY EVENING
Qty: 360 TABLET | Refills: 3 | Status: SHIPPED | OUTPATIENT
Start: 2024-08-20

## 2024-08-21 NOTE — TELEPHONE ENCOUNTER
Refill Per Protocol     Requested Prescriptions   Pending Prescriptions Disp Refills    METFORMIN  MG Oral Tablet 24 Hr [Pharmacy Med Name: Metformin Hydrochloride Er 24hr 500 Mg Tab Gran] 360 tablet 0     Sig: Take 4 tablets (2,000 mg total) by mouth every evening.       Diabetes Medication Protocol Passed - 8/18/2024  3:01 AM        Passed - Last A1C < 7.5 and within past 6 months     Lab Results   Component Value Date    A1C 7.6 (A) 07/05/2024             Passed - In person appointment or virtual visit in the past 6 mos or appointment in next 3 mos     Recent Outpatient Visits              3 weeks ago Environmental and seasonal allergies [J30.89]    [facility] ([place], [place])    Nurse Only    1 month ago Diabetes mellitus without complication (HCC)    [facility] Bekah Ng APRN    Office Visit    1 month ago Environmental and seasonal allergies [J30.89]    [facility] [place]    Nurse Only    2 months ago Environmental and seasonal allergies [J30.89]    [facility] [place]    Nurse Only    3 months ago Environmental and seasonal allergies [J30.89]    [facility] [place]    Nurse Only          Future Appointments         Provider Department Appt Notes    In 2 days [facility] CARD PHASE 2 [facility] Cardiopulmonary Rehab Velasquez Vincenzo x1 5/24/24    In 1 week [facility] CARD PHASE 2 [facility] Cardiopulmonary Rehab Velasquez Vincenzo x1 5/24/24    In 1 week EC ALLERGY [facility] ([place], [place])     In 1 week [facility] CARD PHASE 2 [facility] Cardiopulmonary Rehab Velasquez Vincenzo x1 5/24/24    In 2 weeks [facility] CARD PHASE 2 [facility] Cardiopulmonary Rehab Velasquez Vincenzo x1 5/24/24    In 2 weeks Bekah Ng APRN [facility]  Yonathan Ipava Follow-up Diabetes    In 2 weeks [facility] CARD PHASE 2 [facility] Cardiopulmonary Rehab Velasquez Vincenzo x1 5/24/24    In 2 weeks Sincere Greenberg MD [facility] Annual physical    In 3 weeks [facility] CARD PHASE 2 [facility] Cardiopulmonary Rehab Velasquez Vincenzo x1 5/24/24    In 3 weeks [facility] CARD PHASE 2 [facility] Cardiopulmonary Rehab Velasquez Vincenzo x1 5/24/24    In 4 weeks [facility] CARD PHASE 2 [facility] Cardiopulmonary Rehab Velasquez Vincenzo x1 5/24/24    In 1 month [facility] CARD PHASE 2 [facility] Cardiopulmonary Rehab Velasquez Vincenzo x1 5/24/24    In 1 month [facility] CARD PHASE 2 [facility] Cardiopulmonary Rehab Velasquez Vincenzo x1 5/24/24    In 1 month EC ALLERGY [facility]     In 1 month [facility] CARD PHASE 2 [facility] Cardiopulmonary Rehab Velasquez Vincenzo x1 5/24/24    In 1 month [facility] CARD PHASE 2 [facility] Cardiopulmonary Rehab Velasquez Vincenzo x1 5/24/24    In 1 month [facility] CARD PHASE 2 [facility] Cardiopulmonary Rehab Velasquez Vincenzo x1 5/24/24    In 1 month [facility] CARD PHASE 2 [facility] Cardiopulmonary Rehab Velasquez Vincenzo x1 5/24/24    In 1 month [facility] CARD PHASE 2 [facility] Cardiopulmonary Rehab Velasquez Vincenzo x1 5/24/24    In 1 month [facility] CARD PHASE 2 [facility] Cardiopulmonary Rehab Velasquez Vincenzo x1 5/24/24    In 1 month [facility] CARD PHASE 2 [facility] Cardiopulmonary Rehab Velasquez Vincenzo x1 5/24/24    In 2 months [facility] CARD PHASE 2 [facility] Cardiopulmonary Rehab Velasquez Vincenzo x1 5/24/24    In 2 months EC ALLERGY [facility]     In 2 months [facility] CARD PHASE 2 [facility] Cardiopulmonary Rehab Velasquez Vincenzo x1 5/24/24    In 2 months [facility] CARD PHASE 2 [facility] Cardiopulmonary Rehab Velasquez Vincenzo x1  5/24/24    In 2 months [facility] CARD PHASE 2 [facility] Cardiopulmonary Rehab Velasquez Vincenzo x1 5/24/24    In 2 months [facility] CARD PHASE 2 [facility] Cardiopulmonary Rehab Velasquez Vincenzo x1 5/24/24    In 2 months [facility] CARD PHASE 2 [facility] Cardiopulmonary Rehab Velasquez Vincenzo x1 5/24/24    In 2 months [facility] CARD PHASE 2 [facility] Cardiopulmonary Rehab Velasquez Vincenzo x1 5/24/24    In 2 months [facility] CARD PHASE 2 [facility] Cardiopulmonary Rehab Velasquez Vincenzo x1 5/24/24    In 3 months [facility] CARD PHASE 2 [facility] Cardiopulmonary Rehab Velasquez Vincenzo x1 5/24/24    In 3 months EC ALLERGY [facility]     In 3 months CF CARD PHASE 2 [facility] Cardiopulmonary Rehab Velasquez Vincenzo x1 5/24/24    In 3 months CF CARD PHASE 2 [facility] Cardiopulmonary Rehab Velasquez Vincenzo x1 5/24/24    In 3 months [facility] CARD PHASE 2 [facility] Cardiopulmonary Rehab Velasquez Vincenzo x1 5/24/24    In 3 months CF CARD PHASE 2 [facility] Cardiopulmonary Rehab Velasquez Vincenzo x1 5/24/24    In 3 months EC ALLERGY [facility]                     Passed - Microalbumin procedure in past 12 months or taking ACE/ARB        Passed - EGFRCR or GFRNAA > 50     GFR Evaluation  EGFRCR: 78 , resulted on 5/16/2024          Passed - GFR in the past 12 months               Future Appointments         Provider Department Appt Notes    In 2 days [facility] CARD PHASE 2 [facility] Cardiopulmonary Rehab Velasquez Vincenzo x1 5/24/24    In 1 week [facility] CARD PHASE 2 [facility] Cardiopulmonary Rehab Velasquez Vincenzo x1 5/24/24    In 1 week EC ALLERGY [facility]     In 1 week CF CARD PHASE 2 [facility] Cardiopulmonary Rehab Velasquez Vincenzo x1 5/24/24    In 2 weeks CF CARD PHASE 2 [facility]  [facility] Cardiopulmonary Rehab Velasquez Vincenzo x1 5/24/24    In 2 weeks Bekah Ng APRN [facility] [place] [facility], [facility] Follow-up Diabetes    In 2 weeks [facility] CARD PHASE 2 [facility] Cardiopulmonary Rehab Velasquez Vincenzo x1 5/24/24    In 2 weeks Sincere Greenberg MD [facility] [place] [facility], [facility] physical    In 3 weeks [facility] CARD PHASE 2 [facility] Cardiopulmonary Rehab Velasquez Vincenzo x1 5/24/24    In 3 weeks [facility] CARD PHASE 2 [facility] Cardiopulmonary Rehab Velasquez Vincenzo x1 5/24/24    In 4 weeks [facility] CARD PHASE 2 [facility] Cardiopulmonary Rehab Velasquez Vincenzo x1 5/24/24    In 1 month [facility] CARD PHASE 2 [facility] Cardiopulmonary Rehab Velasquez Vincenzo x1 5/24/24    In 1 month [facility] CARD PHASE 2 [facility] Cardiopulmonary Rehab Velasquez Vincenzo x1 5/24/24    In 1 month EC ALLERGY [facility]     In 1 month [facility] CARD PHASE 2 [facility] Cardiopulmonary Rehab Velasquez Vincenzo x1 5/24/24    In 1 month [facility] CARD PHASE 2 [facility] Cardiopulmonary Rehab Velasquez Vincenzo x1 5/24/24    In 1 month [facility] CARD PHASE 2 [facility] Cardiopulmonary Rehab Velasquez Vincenzo x1 5/24/24    In 1 month [facility] CARD PHASE 2 [facility] Cardiopulmonary Rehab Velasquez Vincenzo x1 5/24/24    In 1 month [facility] CARD PHASE 2 [facility] Cardiopulmonary Rehab Velasquez Vincenzo x1 5/24/24    In 1 month [facility] CARD PHASE 2 [facility] Cardiopulmonary Rehab Velasquez Vincenzo x1 5/24/24    In 1 month [facility] CARD PHASE 2 [facility] Cardiopulmonary Rehab Velasquez Vincenzo x1 5/24/24    In 2 months [facility] CARD PHASE 2 [facility] Cardiopulmonary Rehab Velasquez Vincenzo x1 5/24/24    In 2 months EC ALLERGY [facility]     In 2 months [facility] CARD PHASE 2 [facility]  Cardiopulmonary Rehab Velasquez Vincenzo x1 5/24/24    In 2 months [facility] CARD PHASE 2 [facility] Cardiopulmonary Rehab Velasquez Vincenzo x1 5/24/24    In 2 months [facility] CARD PHASE 2 [facility] Cardiopulmonary Rehab Velasquez Vincenzo x1 5/24/24    In 2 months [facility] CARD PHASE 2 [facility] Cardiopulmonary Rehab Velasquez Vincenzo x1 5/24/24    In 2 months [facility] CARD PHASE 2 [facility] Cardiopulmonary Rehab Velasquez Vincenzo x1 5/24/24    In 2 months [facility] CARD PHASE 2 [facility] Cardiopulmonary Rehab Velasquez Vincenzo x1 5/24/24    In 2 months [facility] CARD PHASE 2 [facility] Cardiopulmonary Rehab Velasquez Vincenzo x1 5/24/24    In 3 months [facility] CARD PHASE 2 [facility] Cardiopulmonary Rehab Velasquez Vincenzo x1 5/24/24    In 3 months EC ALLERGY [facility] [place]     In 3 months [facility] CARD PHASE 2 [facility] Cardiopulmonary Rehab Velasquez Vincenzo x1 5/24/24    In 3 months [facility] CARD PHASE 2 [facility] Cardiopulmonary Rehab Velasquez Vincenzo x1 5/24/24    In 3 months [facility] CARD PHASE 2 [facility] Cardiopulmonary Rehab Velasquez Vincenzo x1 5/24/24    In 3 months CF CARD PHASE 2 [facility] Cardiopulmonary Rehab Velasquez Vincenzo x1 5/24/24    In 3 months EC ALLERGY [facility] [place]           Recent Outpatient Visits              3 weeks ago Environmental and seasonal allergies [J30.89]    [facility] [place]    Nurse Only    1 month ago Diabetes mellitus without complication (HCC)    [facility] Bekah Ng APRN    Office Visit    1 month ago Environmental and seasonal allergies [J30.89]    [facility] [place]    Nurse Only    2 months ago Environmental and seasonal allergies [J30.89]    [facility]  [place]    Nurse Only    3 months ago Environmental and seasonal allergies [J30.89]    [facility], [place]    Nurse Only

## 2024-08-22 ENCOUNTER — CARDPULM VISIT (OUTPATIENT)
Dept: CARDIAC REHAB | Facility: HOSPITAL | Age: 69
End: 2024-08-22
Attending: INTERNAL MEDICINE
Payer: MEDICARE

## 2024-08-22 PROCEDURE — 93798 PHYS/QHP OP CAR RHAB W/ECG: CPT

## 2024-08-23 ENCOUNTER — TELEPHONE (OUTPATIENT)
Dept: ENDOCRINOLOGY CLINIC | Facility: CLINIC | Age: 69
End: 2024-08-23

## 2024-08-23 DIAGNOSIS — E11.9 DIABETES MELLITUS WITHOUT COMPLICATION (HCC): Primary | ICD-10-CM

## 2024-08-23 RX ORDER — TIRZEPATIDE 2.5 MG/.5ML
2.5 INJECTION, SOLUTION SUBCUTANEOUS WEEKLY
Qty: 2 ML | Refills: 2 | Status: SHIPPED | OUTPATIENT
Start: 2024-08-23

## 2024-08-23 NOTE — TELEPHONE ENCOUNTER
Patient called to inform Celina WAGNER, that the pharmacy is not able to refill the medication without the ICD code for the Mounjaro.       Tirzepatide (MOUNJARO) 2.5 MG/0.5ML Subcutaneous Solution Pen-injector, Inject 2.5 mg into the skin once a week., Disp: 2 mL, Rfl: 2

## 2024-08-23 NOTE — TELEPHONE ENCOUNTER
New script for Mounjaro with ICD code sent to pharmacy.     Detailed message left on patients voicemail (HIPAA Verified) informing script resent. Office number provided if any questions.

## 2024-08-24 RX ORDER — LEVOCETIRIZINE DIHYDROCHLORIDE 5 MG/1
5 TABLET, FILM COATED ORAL NIGHTLY
Qty: 90 TABLET | Refills: 1 | Status: SHIPPED | OUTPATIENT
Start: 2024-08-24

## 2024-08-24 NOTE — TELEPHONE ENCOUNTER
Refill requested for    Name from pharmacy: Levocetirizine Dihydrochloride 5 Mg Tab Teva         Will file in chart as: LEVOCETIRIZINE 5 MG Oral Tab    Sig: TAKE ONE TABLET BY MOUTH NIGHTLY    Disp: 90 tablet    Refills: 0    Start: 8/23/2024    Class: Normal    Non-formulary    Last ordered: 5 months ago (3/2/2024) by Basil Irby MD    Last refill: 5/30/2024    Rx #: [id number]    Antihistamines [id number]/2024 11:20 AM   Protocol Details Appt in past 12 mos or next 1 mos      To be filled at: [id number] - [address] [phone], [phone]       Last office visit: 02/20/2024    Previously advised to follow up in one year     ACTION: Refilled per protocol.

## 2024-08-27 ENCOUNTER — CARDPULM VISIT (OUTPATIENT)
Dept: CARDIAC REHAB | Facility: HOSPITAL | Age: 69
End: 2024-08-27
Attending: INTERNAL MEDICINE
Payer: MEDICARE

## 2024-08-27 ENCOUNTER — NURSE ONLY (OUTPATIENT)
Dept: ALLERGY | Facility: CLINIC | Age: 69
End: 2024-08-27

## 2024-08-27 DIAGNOSIS — J30.89 ENVIRONMENTAL AND SEASONAL ALLERGIES: Primary | ICD-10-CM

## 2024-08-27 PROCEDURE — 95117 IMMUNOTHERAPY INJECTIONS: CPT | Performed by: ALLERGY & IMMUNOLOGY

## 2024-08-27 PROCEDURE — 93798 PHYS/QHP OP CAR RHAB W/ECG: CPT

## 2024-08-28 ENCOUNTER — TELEPHONE (OUTPATIENT)
Dept: ENDOCRINOLOGY CLINIC | Facility: CLINIC | Age: 69
End: 2024-08-28

## 2024-08-28 NOTE — TELEPHONE ENCOUNTER
Spoke to pharmacist to provide DX code: E11.9 (pharmacist stated she was looking at previous RX - not one sent 8/23/24)

## 2024-08-28 NOTE — TELEPHONE ENCOUNTER
[place] in [place] called to request the ICD code for the patient's Mounjaro. Pharmacy called and stated they never received the new script for the patient's Mounjaro with the ICD code. Please advise.       Tirzepatide (MOUNJARO) 2.5 MG/0.5ML Subcutaneous Solution Pen-injector, Inject 2.5 mg into the skin once a week., Disp: 2 mL, Rfl: 2    See telephone encounter 8/23.

## 2024-08-29 ENCOUNTER — CARDPULM VISIT (OUTPATIENT)
Dept: CARDIAC REHAB | Facility: HOSPITAL | Age: 69
End: 2024-08-29
Attending: INTERNAL MEDICINE
Payer: MEDICARE

## 2024-08-29 PROCEDURE — 93798 PHYS/QHP OP CAR RHAB W/ECG: CPT

## 2024-09-03 ENCOUNTER — CARDPULM VISIT (OUTPATIENT)
Dept: CARDIAC REHAB | Facility: HOSPITAL | Age: 69
End: 2024-09-03
Attending: INTERNAL MEDICINE
Payer: MEDICARE

## 2024-09-03 DIAGNOSIS — E11.9 DIABETES MELLITUS WITHOUT COMPLICATION (HCC): Primary | ICD-10-CM

## 2024-09-03 PROCEDURE — 93798 PHYS/QHP OP CAR RHAB W/ECG: CPT

## 2024-09-03 RX ORDER — BLOOD SUGAR DIAGNOSTIC
1 STRIP MISCELLANEOUS 2 TIMES DAILY
Qty: 200 STRIP | Refills: 3 | Status: SHIPPED | OUTPATIENT
Start: 2024-09-03

## 2024-09-03 RX ORDER — LANCETS 30 GAUGE
EACH MISCELLANEOUS
Qty: 200 EACH | Refills: 2 | Status: SHIPPED | OUTPATIENT
Start: 2024-09-03

## 2024-09-03 NOTE — TELEPHONE ENCOUNTER
Dr. Greenberg, pended new order for test strips and lancets, if appropriate    Call from [id number], spoke to Dominick Alonso.   Wife informed pharmacy that patient tests 2-3 times daily.   Current orders from 2/15/24 say to test once per day.     New prescription is needed.

## 2024-09-04 ENCOUNTER — OFFICE VISIT (OUTPATIENT)
Dept: ENDOCRINOLOGY CLINIC | Facility: CLINIC | Age: 69
End: 2024-09-04
Payer: MEDICARE

## 2024-09-04 VITALS
BODY MASS INDEX: 27.98 KG/M2 | HEIGHT: 74 IN | DIASTOLIC BLOOD PRESSURE: 71 MMHG | HEART RATE: 58 BPM | SYSTOLIC BLOOD PRESSURE: 124 MMHG | WEIGHT: 218 LBS

## 2024-09-04 DIAGNOSIS — E11.9 DIABETES MELLITUS WITHOUT COMPLICATION (HCC): Primary | ICD-10-CM

## 2024-09-04 LAB
GLUCOSE BLOOD: 167
TEST STRIP LOT #: NORMAL NUMERIC

## 2024-09-04 PROCEDURE — 99214 OFFICE O/P EST MOD 30 MIN: CPT

## 2024-09-04 PROCEDURE — 82947 ASSAY GLUCOSE BLOOD QUANT: CPT

## 2024-09-04 RX ORDER — TIRZEPATIDE 5 MG/.5ML
5 INJECTION, SOLUTION SUBCUTANEOUS WEEKLY
Qty: 2 ML | Refills: 5 | Status: SHIPPED | OUTPATIENT
Start: 2024-09-04

## 2024-09-04 NOTE — PROGRESS NOTES
Name: Jose Simon Jr.  Date: 9/4/24    Referring Physician: No ref. provider found    HISTORY OF PRESENT ILLNESS   Jose Simon Jr. is a 69 year old male who presents for follow up for diabetes mellitus     Diabetes History:  Diagnosed- >20 years ago   Patient has not had hospitalizations for blood sugar issues    Prior glycohemoglobin were 7.2% 5/2024; 7.6% 7/2024   Glucose in clinic today - 167 mg/dl     Dietary compliance: Good watches diet closely and follows low CHO diet- counts carbs daily and generally eating between  grams of carbs daily rarely eating fast food and if he does will get salad. Avoids pasta and rice.      Recall:  Breakfast- power bar (9gm), or yogurt with cheese (babybel), or occ eggs with 2 pieces of wheat toast   Lunch- beef jerkey sticks with cheese, or sandwich on small zuri (low CHO), with low sodium turkey with cheese   Dinner- salad 4-5 times weekly, low CHO, sugar free dressing. Low CHO vegetables with salad and cheese, or fish 1-2 times weekly, occ. Pork or beef, chicken with sauce.   Snack- crispy pastry ( 4-5 pieces, 20 gram of carb), or protein drink (low carb), fruit,   Beverages- water with flavoring, 1-2 diet soda, coffee- decaf     Exercise: Yes- watching [employer], [employer] in summer months, volunteers at [employer] a lot of walking, treadmill in winter months 3-5 days weekly.     Polyuria/polydipsia: No   Blurred vision: No    Episodes of hypoglycemia: No, symptoms in low 100's     Blood Glucose:  Checking 2-3 times per day  Logs scanned to chart: below are readings for last 1-2 months    Fasting- 140-150's   Evening- 's     Current DM Regimen:  Mounjaro 2.5 mg subcutaneous weekly - tolerating medication  Metformin 2000 mg with dinner meal     Modifying factors:  Medication adherence: Yes   Recent steroids, illness or infections: No       REVIEW OF SYSTEMS  Eyes: Diabetic retinopathy present: No            Most recent visit to eye doctor in last 12 months: Yes-  Dr. Carpenter 9/2023; has upcoming appt scheduled 9/2024    CV: Cardiovascular disease present: Yes- s/p stent 5/2024         Hypertension present: Yes, well controlled          Hyperlipidemia present: No         Peripheral Vascular Disease present: No    : Nephropathy present: Yes    Neuro: Neuropathy present: Yes- numbness in toes     Skin: Infection or ulceration: No    Osteoporosis: No    Thyroid disease: No      Medications:     Current Outpatient Medications:     Tirzepatide (MOUNJARO) 2.5 MG/0.5ML Subcutaneous Solution Pen-injector, Inject 2.5 mg into the skin once a week., Disp: 2 mL, Rfl: 2    metFORMIN  MG Oral Tablet 24 Hr, Take 4 tablets (2,000 mg total) by mouth every evening., Disp: 360 tablet, Rfl: 3    Lancets Does not apply Misc, Test two times daily, Disp: 200 each, Rfl: 2    Glucose Blood (BLOOD GLUCOSE TEST STRIPS 333) In Vitro Strip, 1 each by In Vitro route 2 (two) times daily., Disp: 200 strip, Rfl: 3    levocetirizine 5 MG Oral Tab, TAKE ONE TABLET BY MOUTH NIGHTLY, Disp: 90 tablet, Rfl: 1    ondansetron (ZOFRAN) 4 mg tablet, Take 1 tablet (4 mg total) by mouth every 8 (eight) hours as needed for Nausea., Disp: 10 tablet, Rfl: 2    ALPRAZolam (XANAX) 0.5 MG Oral Tab, Take one an hour prior to flying and repeat if necessary for anxiety ., Disp: 10 tablet, Rfl: 0    PRASUGREL HCL OR, , Disp: , Rfl:     Probiotic Product (PROBIOTIC-10 OR), Probiotic 10 billion cell capsule, [RxNorm: 7577348], Disp: , Rfl:     atorvastatin 40 MG Oral Tab, Take 1 tablet (40 mg total) by mouth nightly., Disp: 90 tablet, Rfl: 3    carvedilol 6.25 MG Oral Tab, Take 1 tablet (6.25 mg total) by mouth 2 (two) times daily with meals., Disp: , Rfl:     glimepiride 4 MG Oral Tab, Take 1 tablet (4 mg total) by mouth every morning before breakfast AND 1 tablet (4 mg total) before evening meal., Disp: 180 tablet, Rfl: 1    montelukast 10 MG Oral Tab, Take 1 tablet (10 mg total) by mouth nightly., Disp: 90 tablet, Rfl:  3    losartan-hydroCHLOROthiazide 100-25 MG Oral Tab, Take 1 tablet by mouth daily., Disp: 90 tablet, Rfl: 3    tamsulosin 0.4 MG Oral Cap, Take 1 capsule (0.4 mg total) by mouth nightly. TAKE AT BEDTIME, Disp: 90 capsule, Rfl: 3    sertraline 50 MG Oral Tab, Take 1 tablet (50 mg total) by mouth daily., Disp: 90 tablet, Rfl: 3    traMADol 50 MG Oral Tab, TAKE ONE TABLET BY MOUTH EVERY SIX HOURS AS NEEDED FOR PAIN, Disp: 50 tablet, Rfl: 5    albuterol (PROAIR HFA) 108 (90 Base) MCG/ACT Inhalation Aero Soln, Inhale 2 puffs into the lungs every 4 to 6 hours as needed for Wheezing., Disp: 3 each, Rfl: 3    zolpidem 5 MG Oral Tab, Take 1 tablet (5 mg total) by mouth nightly as needed for Sleep., Disp: 20 tablet, Rfl: 0    SUMAtriptan Succinate 100 MG Oral Tab, Take 1 tab as needed for headache, may repeat in 2 hours, maximum 2 tabs in 24 hours., Disp: 9 tablet, Rfl: 2    pseudoephedrine  MG Oral Tablet 12 Hr, Take 1 tablet (120 mg total) by mouth every 12 (twelve) hours., Disp: 60 tablet, Rfl: 3    LORATADINE 10 MG Oral Tab, TAKE ONE TABLET BY MOUTH ONE TIME DAILY  (Patient taking differently: No sig reported), Disp: 90 tablet, Rfl: 2    aspirin 81 MG Oral Tab, Take 1 tablet (81 mg total) by mouth daily., Disp: , Rfl:     Multiple Vitamins-Minerals (MULTIVITAMIN ADULT OR), Take 1 tablet by mouth daily., Disp: , Rfl:     Glucose Blood (SEPIDEH CONTOUR TEST) In Vitro Strip, Test twice a day, Disp: 100 each, Rfl: 11    SEPIDEH MICROLET LANCETS Does not apply Misc, Test twice a day, Disp: 100 each, Rfl: 11    ALPRAZolam 0.5 MG Oral Tab, Take 1 tablet (0.5 mg total) by mouth daily., Disp: , Rfl:     Omega-3 Fatty Acids (OMEGA-3 FISH OIL) 1200 MG Oral Cap, Take  by mouth., Disp: , Rfl:      Allergies:   Allergies   Allergen Reactions    Cats Claw, Uncaria Tomentosa ITCHING     Dander     Mold Runny nose    Ragweed ITCHING       Social History:   Social History     Socioeconomic History    Marital status:    Tobacco  Use    Smoking status: Never    Smokeless tobacco: Never   Vaping Use    Vaping status: Never Used   Substance and Sexual Activity    Alcohol use: Not Currently     Alcohol/week: 0.0 standard drinks of alcohol     Comment: socially    Drug use: No       Medical History:   Past Medical History:    Allergic rhinitis    Asthma (HCC)    Back problem    Degenerative cervical disc    Diabetes (HCC)    Essential hypertension    Lipid screening    Migraine headache    Migraines    Peptic ulcer disease    Seasonal allergies    Visual impairment    glasses       Surgical history:   Past Surgical History:   Procedure Laterality Date    Colonoscopy  2012    repeat in 2017    Colonoscopy  2017    repeat 2024    Colonoscopy N/A 4/18/2024    Procedure: COLONOSCOPY;  Surgeon: Florin Corey MD;  Location: [facility] MAIN OR    Vasectomy           PHYSICAL EXAM  Vitals:    09/04/24 0801   BP: 124/71   Pulse: 58   Weight: 218 lb (98.9 kg)   Height: 6' 2\" (1.88 m)         General Appearance:  alert, well developed, in no acute distress  Eyes:  normal conjunctivae, sclera, and normal pupils  Neck: Trachea midline: Normal  Back: no kyphosis or back tenderness  Lymph Nodes:  No abnormal nodes noted  Musculoskeletal:  normal muscle strength and tone  Skin:  normal moisture and skin texture  Hair & Nails:  normal scalp hair     Hematologic:  no excessive bruising  Neuro:  sensory grossly intact and motor grossly intact.  Psychiatric:  oriented to time, self, and place  Nutritional:  no abnormal weight gain or loss  Feet: Bilateral barefoot skin diabetic exam is normal, visualized feet and the appearance is normal.  Bilateral monofilament/sensation of both feet is normal.  Pulsation pedal pulse exam of both lower legs/feet is normal as well.        ASSESSMENT/PLAN:    Diabetes mellitus type 2 uncontrolled  -HgA1c - 7.6% 7/2024   -Reviewed ABC's of diabetes   - Reviewed pathogenesis of diabetes.   - Reviewed importance of good glycemic  control to prevent microvascular and macrovascular complications including nephropathy, neuropathy, retinopathy, and cardiovascular disease.  - Reviewed importance of SBGM- check glucose 1-2 times daily   - Reviewed target glucose goals for patient  fasting and <180 post prandially   - Reviewed importance of following diabetic diet- recommended 135 grams of CHO per day or 45 grams per meal.   - Provided patient education materials    - Reviewed diet at length and agree overall he is doing very well with low CHO diet, incorporates whole grains, higher fiber carbs and avoids starches. Avoids sweetened beverages. Is also choosing lower fat/sodium options recently after having stent placed a month ago. Encouraged him to continue with diet and reviewed carb counting.     - Continue Metformin 2000 mg PO daily.     - Increase Mounjaro to 5mg subcutaneous weekly. Reviewed side effects and risks vs benefits of medication and reviewed pen injection. Reviewed cardiac benefit.     - Continue to check sugars daily.    - normotensive   - Lipids 5/2024- at goal, on statin  - + nephropathy- reviewed lab with patient. Reviewed importance of good glycemic control and he is on losartan   - UTD with optho - sees yearly   - normal foot exam today       RTC in 3 months   9/4/24  BERNARD Cage    A total of  30 minutes was spent on obtaining history, reviewing pertinent imaging/labs and specialists notes, evaluating patient, providing multiple treatment options, reinforcing diet/exercise and compliance, and completing documentation.

## 2024-09-05 ENCOUNTER — CARDPULM VISIT (OUTPATIENT)
Dept: CARDIAC REHAB | Facility: HOSPITAL | Age: 69
End: 2024-09-05
Attending: INTERNAL MEDICINE
Payer: MEDICARE

## 2024-09-05 PROCEDURE — 93798 PHYS/QHP OP CAR RHAB W/ECG: CPT

## 2024-09-05 RX ORDER — LANCETS
EACH MISCELLANEOUS
Qty: 200 EACH | Refills: 3 | Status: SHIPPED | OUTPATIENT
Start: 2024-09-05

## 2024-09-05 NOTE — TELEPHONE ENCOUNTER
[facility] called back on this.  States prescriptions need specifically to say \"countour next\" strips and \"microlet\" lancets for [facility] to approve.  Please include diagnosis and insulin status.  Resent per original written order.

## 2024-09-09 ENCOUNTER — OFFICE VISIT (OUTPATIENT)
Dept: INTERNAL MEDICINE CLINIC | Facility: CLINIC | Age: 69
End: 2024-09-09

## 2024-09-09 VITALS
BODY MASS INDEX: 28.99 KG/M2 | TEMPERATURE: 98 F | WEIGHT: 214 LBS | HEIGHT: 72 IN | DIASTOLIC BLOOD PRESSURE: 70 MMHG | HEART RATE: 62 BPM | SYSTOLIC BLOOD PRESSURE: 110 MMHG | OXYGEN SATURATION: 98 %

## 2024-09-09 DIAGNOSIS — Z00.00 MEDICARE ANNUAL WELLNESS VISIT, SUBSEQUENT: Primary | ICD-10-CM

## 2024-09-09 DIAGNOSIS — E11.9 DIABETES MELLITUS WITHOUT COMPLICATION (HCC): ICD-10-CM

## 2024-09-09 DIAGNOSIS — I10 ESSENTIAL HYPERTENSION, BENIGN: ICD-10-CM

## 2024-09-09 DIAGNOSIS — Z12.5 PROSTATE CANCER SCREENING: ICD-10-CM

## 2024-09-09 DIAGNOSIS — E78.00 PURE HYPERCHOLESTEROLEMIA: ICD-10-CM

## 2024-09-09 PROBLEM — Z12.11 COLON CANCER SCREENING: Status: RESOLVED | Noted: 2023-09-19 | Resolved: 2024-09-09

## 2024-09-09 RX ORDER — BACILLUS COAGULANS/INULIN 1B-250 MG
CAPSULE ORAL
COMMUNITY

## 2024-09-09 RX ORDER — ASCORBIC ACID 500 MG
TABLET ORAL
COMMUNITY
Start: 2024-03-11

## 2024-09-09 RX ORDER — PRASUGREL 10 MG/1
10 TABLET, FILM COATED ORAL DAILY
COMMUNITY
Start: 2024-08-20

## 2024-09-09 RX ORDER — AMPICILLIN TRIHYDRATE 250 MG
CAPSULE ORAL
COMMUNITY
Start: 2024-03-11

## 2024-09-09 NOTE — H&P
HPI:   Jose Simon Jr. is a 69 year old male who presents for a Medicare Annual Wellness visit.    Patient Active Problem List   Diagnosis    Essential hypertension, benign    Diabetes mellitus without complication (HCC)    Pure hypercholesterolemia    Medicare annual wellness visit, subsequent    Prostate cancer screening       General Health     In the past six months, have you lost more than 10 pounds without trying?: 2 - No    Has your appetite been poor?: No    Type of Diet: Diabetic    How does the patient maintain a good energy level?: Appropriate Exercise    How would you describe your daily physical activity?: Moderate    How would you describe your current health state?: Good    How do you maintain positive mental well-being?: Visiting Family         Have you had any immunizations at another office such as Influenza, Hepatitis B, Tetanus, or Pneumococcal?: No     Functional Ability     Bathing or Showering: Able without help    Toileting: Able without help    Dressing: Able without help    Eating: Able without help    Driving: Able without help    Preparing your meals: Able without help    Managing money/bills: Able without help    Taking medications as prescribed: Able without help    Are you able to afford your medications?: Yes    Hearing Problems?: No     Functional Status     Hearing Problems?: No    Vision Problems? : No    Difficulty walking?: No    Difficulty dressing or bathing?: No    Problems with daily activities? : No    Memory Problems?: No      Fall/Risk Assessment                                                              Depression Screening (PHQ-2/PHQ-9): Over the LAST 2 WEEKS                      Advance Directives     Do you have a healthcare power of ?: No    Do you have a living will?: Yes   Was Medicare Assessment Questionnaire completed by patient and sent to HIM for scanning?Yes    Please go to \"Cognitive Assessment\" under Medicare Assessment section in Charting,  test patient and document.  .    Then, refresh your progress note to see your input here.  Cognitive Assessment     What day of the week is this?: Correct    What month is it?: Correct    What year is it?: Correct    Recall \"Ball\": Correct    Recall \"Flag\": Correct    Recall \"Tree\": Correct      ALLERGIES:     Allergies   Allergen Reactions    Cats Claw, Uncaria Tomentosa ITCHING     Dander     Mold Runny nose    Ragweed ITCHING       CURRENT MEDICATIONS:   Current Outpatient Medications   Medication Sig Dispense Refill    prasugrel 10 MG Oral Tab Take 1 tablet (10 mg total) by mouth daily.      Bacillus Coagulans-Inulin (PROBIOTIC) 1-250 BILLION-MG Oral Cap       Cholecalciferol 50 MCG (2000 UT) Oral Tab Vitamin D3 50 mcg (2,000 unit) tablet, [RxNorm: 079957]      Vitamin C 500 MG Oral Tab Vitamin C 500 mg tablet, [RxNorm: 476966]      Cinnamon 500 MG Oral Cap Cinnamon 500 mg capsule, [RxNorm: 635590]      Tirzepatide (MOUNJARO) 5 MG/0.5ML Subcutaneous Solution Pen-injector Inject 5 mg into the skin once a week. 2 mL 5    metFORMIN  MG Oral Tablet 24 Hr Take 4 tablets (2,000 mg total) by mouth every evening. 360 tablet 3    atorvastatin 40 MG Oral Tab Take 1 tablet (40 mg total) by mouth nightly. 90 tablet 3    carvedilol 6.25 MG Oral Tab Take 1 tablet (6.25 mg total) by mouth 2 (two) times daily with meals.      montelukast 10 MG Oral Tab Take 1 tablet (10 mg total) by mouth nightly. 90 tablet 3    losartan-hydroCHLOROthiazide 100-25 MG Oral Tab Take 1 tablet by mouth daily. 90 tablet 3    tamsulosin 0.4 MG Oral Cap Take 1 capsule (0.4 mg total) by mouth nightly. TAKE AT BEDTIME 90 capsule 3    sertraline 50 MG Oral Tab Take 1 tablet (50 mg total) by mouth daily. 90 tablet 3    aspirin 81 MG Oral Tab Take 1 tablet (81 mg total) by mouth daily.      Multiple Vitamins-Minerals (MULTIVITAMIN ADULT OR) Take 1 tablet by mouth daily.      Omega-3 Fatty Acids (OMEGA-3 FISH OIL) 1200 MG Oral Cap Take  by mouth.       Glucose Blood (CONTOUR NEXT TEST) In Vitro Strip Use to check blood sugar twice daily 200 each 3    Microlet Lancets Does not apply Misc Use to check blood sugar twice daily 200 each 3    Lancets Does not apply Misc Test two times daily 200 each 2    Glucose Blood (BLOOD GLUCOSE TEST STRIPS 333) In Vitro Strip 1 each by In Vitro route 2 (two) times daily. 200 strip 3    levocetirizine 5 MG Oral Tab TAKE ONE TABLET BY MOUTH NIGHTLY 90 tablet 1    ondansetron (ZOFRAN) 4 mg tablet Take 1 tablet (4 mg total) by mouth every 8 (eight) hours as needed for Nausea. 10 tablet 2    ALPRAZolam (XANAX) 0.5 MG Oral Tab Take one an hour prior to flying and repeat if necessary for anxiety . 10 tablet 0    traMADol 50 MG Oral Tab TAKE ONE TABLET BY MOUTH EVERY SIX HOURS AS NEEDED FOR PAIN 50 tablet 5    albuterol (PROAIR HFA) 108 (90 Base) MCG/ACT Inhalation Aero Soln Inhale 2 puffs into the lungs every 4 to 6 hours as needed for Wheezing. 3 each 3    zolpidem 5 MG Oral Tab Take 1 tablet (5 mg total) by mouth nightly as needed for Sleep. 20 tablet 0    SUMAtriptan Succinate 100 MG Oral Tab Take 1 tab as needed for headache, may repeat in 2 hours, maximum 2 tabs in 24 hours. 9 tablet 2    pseudoephedrine  MG Oral Tablet 12 Hr Take 1 tablet (120 mg total) by mouth every 12 (twelve) hours. 60 tablet 3    LORATADINE 10 MG Oral Tab TAKE ONE TABLET BY MOUTH ONE TIME DAILY  (Patient taking differently: No sig reported) 90 tablet 2    Glucose Blood (SEPIDEH CONTOUR TEST) In Vitro Strip Test twice a day 100 each 11    SEPIDEH MICROLET LANCETS Does not apply Misc Test twice a day 100 each 11    ALPRAZolam 0.5 MG Oral Tab Take 1 tablet (0.5 mg total) by mouth daily.        MEDICAL INFORMATION:   Past Medical History:    Allergic rhinitis    Asthma (HCC)    Back problem    Degenerative cervical disc    Diabetes (HCC)    Essential hypertension    Lipid screening    Migraine headache    Migraines    Peptic ulcer disease    Seasonal allergies     Visual impairment    glasses      Past Surgical History:   Procedure Laterality Date    Colonoscopy  2012    repeat in 2017    Colonoscopy  2017    repeat 2024    Colonoscopy N/A 4/18/2024    Procedure: COLONOSCOPY;  Surgeon: Florin Corey MD;  Location: [facility] MAIN OR    Vasectomy        Family History   Problem Relation Age of Onset    Hypertension Father     Diabetes Father     Cancer Father         colon    Heart Disorder Father     Other (Other) Mother         copd    Cancer Daughter         thyroid    Hypertension Son     Cancer Paternal Grandmother         colon      SOCIAL HISTORY:   Social History     Socioeconomic History    Marital status:    Tobacco Use    Smoking status: Never    Smokeless tobacco: Never   Vaping Use    Vaping status: Never Used   Substance and Sexual Activity    Alcohol use: Not Currently     Alcohol/week: 0.0 standard drinks of alcohol     Comment: socially    Drug use: No     Occ: retired  : yes       REVIEW OF SYSTEMS:   GENERAL: feels well otherwise  SKIN: denies any unusual skin lesions  EYES: denies blurred vision or double vision  HEENT: denies nasal congestion, sinus pain or ST  LUNGS: denies shortness of breath with exertion  CARDIOVASCULAR: denies chest pain on exertion  GI: denies abdominal pain, denies heartburn  : 1 per night nocturia, no complaint of urinary incontinence  MUSCULOSKELETAL: denies back pain  NEURO: denies headaches  PSYCHE: denies depression or anxiety  HEMATOLOGIC: denies hx of anemia  ENDOCRINE: denies thyroid history  ALL/ASTHMA: denies hx of allergy or asthma    EXAM:   /70   Pulse 62   Temp 97.7 °F (36.5 °C)   Ht 6' (1.829 m)   Wt 214 lb (97.1 kg)   SpO2 98%   BMI 29.02 kg/m²      >   Wt Readings from Last 6 Encounters:   09/09/24 214 lb (97.1 kg)   09/04/24 218 lb (98.9 kg)   07/05/24 231 lb (104.8 kg)   05/15/24 232 lb (105.2 kg)   05/07/24 227 lb (103 kg)   04/09/24 225 lb (102.1 kg)       >   BP Readings from  Last 3 Encounters:   09/09/24 110/70   09/04/24 124/71   07/05/24 122/70       GENERAL: well developed, well nourished, in no apparent distress  SKIN: no rashes, no suspicious lesions  HEENT: atraumatic, normocephalic, ears and throat are clear  Hearing assessed via: [place]  EYES: PERRLA, EOMI, conjunctiva are clear  Right Eye Visual Acuity: Uncorrected Left Eye Visual Acuity: Uncorrected   Right Eye Chart Acuity: 20/25 Left Eye Chart Acuity: 20/25   NECK: supple, no adenopathy, no bruits  CHEST: no chest tenderness  BREAST: no masses or discharge  LUNGS: clear to auscultation  CARDIO: RRR without murmur  GI: good BS's, no masses, HSM or tenderness  : two descended testicles, no masses, no hernia and no penile lesions  RECTAL: good rectal tone, prostate shows no masses  MUSCULOSKELETAL: back is not tender, FROM of the back  EXTREMITIES: no cyanosis, clubbing or edema.  Diabetic Foot Exam:  No skin breakdown, acute deformity, history of amputation, dystrophic nails or dry skin.  NEURO: Oriented times three, cranial nerves are intact, motor and sensory are grossly intact    ASSESSMENT AND OTHER RELEVANT CHRONIC CONDITIONS:   Jose Simon Jr. is a 69 year old male who presents for a Medicare Assessment.     PLAN SUMMARY:   Pure hypercholesterolemia  Lipids are controlled.     Prostate cancer screening  Psa soon , rectal today .     Medicare annual wellness visit, subsequent  Physical today   Labs soon   Sees cardiology   Sees adalgisa  C scope next 2031.     Essential hypertension, benign  Well controlled.     Diabetes mellitus without complication (HCC)  Sees adalgisa on Mounjaro , has lost 17 lbs and glucose is better controlled.        The patient indicates understanding of these issues and agrees to the plan.  The patient is asked to return in 6 months  for follow up.       PREVENTATIVE SERVICES  INDICATIONS AND SCHEDULE Internal Lab or Procedure External Lab or Procedure   Diabetes Screening      HbgA1C    Annually HEMOGLOBIN A1C (%)   Date Value   07/05/2024 7.6 (A)         No data to display                Fasting Blood Sugar (FSB)Annually No results found for: \"GLUCOSE\"    Cardiovascular Disease Screening     LDL Annually LDL Cholesterol (mg/dL)   Date Value   05/03/2024 60     LDL-CHOLESTEROL (mg/dL (calc))   Date Value   05/07/2015 117        EKG One Time done    Colorectal Cancer Screening      Colonoscopy Screen every 10 years Health Maintenance   Topic Date Due    Colorectal Cancer Screening  04/18/2031    Update Health Maintenance if applicable    Flex Sigmoidoscopy Screen every 10 years No results found for this or any previous visit.      No data to display                 Fecal Occult Blood Annually No results found for: \"FOB\"      No data to display                Glaucoma Screening      Ophthalmology Visit Annually done    Prostate Cancer Screening      PSA  Annually Health Maintenance   Topic Date Due    PSA  09/19/2025     Update Health Maintenance if applicable   Immunizations      Influenza No orders found for this or any previous visit. Update Immunization Activity if applicable    Pneumococcal Orders placed or performed in visit on 07/09/20    PNEUMOCOCCAL IMM, 23    Update Immunization Activity if applicable    Hepatitis B No orders found for this or any previous visit. Update Immunization Activity if applicable    Tetanus No orders found for this or any previous visit. Update Immunization Activity if applicable        SPECIFIC DISEASE MONITORING Internal Lab or Procedure External Lab or Procedure   Annual Monitoring of Persistent     Medications (ACE/ARB, digoxin diuretics, anticonvulsants.)    Potassium  Annually Potassium (mmol/L)   Date Value   05/16/2024 4.0     POTASSIUM (mmol/L)   Date Value   05/07/2015 3.6     POTASSIUM (P) (mmol/L)   Date Value   05/23/2016 3.3         No data to display                Creatinine  Annually CREATININE (mg/dL)   Date Value   05/07/2015 0.92     Creatinine  (mg/dL)   Date Value   05/16/2024 1.04         No data to display                BUN  Annually BUN (mg/dL)   Date Value   05/16/2024 19     UREA NITROGEN (BUN) (mg/dL)   Date Value   05/07/2015 21         No data to display                 Drug Serum Conc  Annually No results found for: \"DIGOXIN\", \"DIG\", \"VALP\"      No data to display                Diabetes      HgbA1C  Annually HEMOGLOBIN A1C (%)   Date Value   07/05/2024 7.6 (A)         No data to display                Creat/alb ratio  Annually      LDL  Annually LDL Cholesterol (mg/dL)   Date Value   05/03/2024 60     LDL-CHOLESTEROL (mg/dL (calc))   Date Value   05/07/2015 117         No data to display                 Dilated Eye exam  Annually     12/29/2023    10:58 AM   Data entered on:   Last Dilated Eye Exam 12/15/2023          No data to display                COPD      Spirometry Testing Annually No results found for this or any previous visit.      No data to display                    SUGGESTED VACCINATIONS - Influenza, Pneumococcal, Zoster, Tetanus     Immunization History   Administered Date(s) Administered    Covid-19 Vaccine Pfizer 30 mcg/0.3 ml 03/09/2021, 03/30/2021    FLU VAC High Dose 65 YRS & Older PRSV Free ([id number]) 10/21/2019, 09/19/2023    FLULAVAL 6 months & older 0.5 ml Prefilled syringe ([id number]) 10/20/2017    High Dose Fluzone Influenza Vaccine, 65yr+ PF 0.5mL ([id number]) 10/30/2022    Influenza 09/05/2014, 10/13/2015    Pneumococcal Conjugate PCV20 06/07/2022    Pneumovax 23 07/09/2020    Zoster Vaccine Live (Zostavax) 10/13/2015

## 2024-09-10 ENCOUNTER — CARDPULM VISIT (OUTPATIENT)
Dept: CARDIAC REHAB | Facility: HOSPITAL | Age: 69
End: 2024-09-10
Attending: INTERNAL MEDICINE
Payer: MEDICARE

## 2024-09-10 ENCOUNTER — PATIENT MESSAGE (OUTPATIENT)
Dept: INTERNAL MEDICINE CLINIC | Facility: CLINIC | Age: 69
End: 2024-09-10

## 2024-09-10 PROCEDURE — 93798 PHYS/QHP OP CAR RHAB W/ECG: CPT

## 2024-09-11 NOTE — TELEPHONE ENCOUNTER
Was seen on 9/9/24 for Medicare annual physical and note about the sinus infection and antibiotic recommendation .   [employer] message sent with instruction.     Future Appointments   Date Time Provider Department Center   9/12/2024  6:45 AM [facility] CARD PHASE 2 RM [facility] CP REHAB [facility]   9/17/2024  6:45 AM [facility] CARD PHASE 2 RM [facility] CP REHAB [facility]   9/19/2024  6:45 AM [facility] CARD PHASE 2 RM [facility] CP REHAB [facility]   9/24/2024  6:45 AM [facility] CARD PHASE 2 RM [facility] CP REHAB [facility]   9/24/2024  9:00 AM EC ALLERGY [facility] [place]   9/26/2024  6:45 AM [facility] CARD PHASE 2 RM [facility] CP REHAB [facility]   10/1/2024  6:45 AM [facility] CARD PHASE 2 RM [facility] CP REHAB [facility]   10/3/2024  6:45 AM [facility] CARD PHASE 2 RM [facility] CP REHAB [facility]   10/8/2024  6:45 AM [facility] CARD PHASE 2 RM [facility] CP REHAB [facility]   10/10/2024  6:45 AM [facility] CARD PHASE 2 RM [facility] CP REHAB [facility]   10/15/2024  6:45 AM [facility] CARD PHASE 2 RM [facility] CP REHAB [facility]   10/17/2024  6:45 AM [facility] CARD PHASE 2 RM [facility] CP REHAB [facility]   10/22/2024  6:45 AM [facility] CARD PHASE 2 RM [facility] CP REHAB [facility]   10/22/2024  9:00 AM EC ALLERGY [facility] [place]   10/24/2024  6:45 AM [facility] CARD PHASE 2 RM [facility] CP REHAB [facility]   10/29/2024  6:45 AM [facility] CARD PHASE 2 RM [facility] CP REHAB [facility]   10/31/2024  6:45 AM [facility] CARD PHASE 2 RM [facility] CP REHAB [facility]   11/5/2024  6:45 AM [facility] CARD PHASE 2 RM [facility] CP REHAB [facility]   11/7/2024  6:45 AM [facility] CARD PHASE 2 RM [facility] CP REHAB [facility]   11/12/2024  6:45 AM [facility] CARD PHASE 2 RM [facility] CP REHAB [facility]   11/14/2024  6:45 AM [facility] CARD PHASE 2 RM [facility] CP REHAB [facility]   11/19/2024  6:45 AM [facility] CARD PHASE 2 RM [facility] CP REHAB [facility]   11/19/2024  9:00 AM EC ALLERGY [facility] [place]   11/21/2024  6:45 AM [facility] CARD PHASE 2 RM [facility] CP REHAB [facility]   11/26/2024  6:45 AM [facility] CARD PHASE 2 RM [facility] CP REHAB [facility]   11/28/2024  6:45 AM [facility] CARD PHASE 2 RM [facility] CP REHAB [facility]   12/3/2024  6:45 AM [facility] CARD PHASE 2 RM [facility] CP REHAB [facility]   12/4/2024  8:45 AM Bekah Ng, BERNARD ECWMOENDO EC [facility]   12/17/2024   9:00 AM EC ALLERGY ECSMATHEW [place]   12/20/2024 11:10 AM Rita Mrorison DPM [facility]           From: Jose Simon Jr.  To: Sincere Greenberg  Sent: 9/10/2024  5:24 PM CDT  Subject: Prescription    Evening Doctor Dionicio  I was wondering if you placed a prescription for my sinus infection? I have not heard from the pharmacy.   Thank you,  Jordi

## 2024-09-12 ENCOUNTER — CARDPULM VISIT (OUTPATIENT)
Dept: CARDIAC REHAB | Facility: HOSPITAL | Age: 69
End: 2024-09-12
Attending: INTERNAL MEDICINE
Payer: MEDICARE

## 2024-09-12 PROCEDURE — 93798 PHYS/QHP OP CAR RHAB W/ECG: CPT

## 2024-09-13 ENCOUNTER — PATIENT MESSAGE (OUTPATIENT)
Dept: ENDOCRINOLOGY CLINIC | Facility: CLINIC | Age: 69
End: 2024-09-13

## 2024-09-14 RX ORDER — TIRZEPATIDE 5 MG/.5ML
5 INJECTION, SOLUTION SUBCUTANEOUS WEEKLY
Qty: 2 ML | Refills: 5 | Status: SHIPPED | OUTPATIENT
Start: 2024-09-14

## 2024-09-14 NOTE — TELEPHONE ENCOUNTER
From: Jose Simon Jr.  To: Bekahalfredo Ng  Sent: 9/13/2024 2:53 PM CDT  Subject: Perscription    Afternoon Bekah,  I went to pick-up my Monjurno and it was still the 2.5 mil.? I know you mentioned of going up to a 5 dosage when we last met? They stated that they never received a new prescription.   I did not pick it up! Should I wait for the new script?  Thank you,  Jordi

## 2024-09-14 NOTE — TELEPHONE ENCOUNTER
Mounjaro 5 mg RX was sent on 9/4/24  Responded to patient with this information and clarifying if the preferred pharmacy on file is correct.

## 2024-09-17 ENCOUNTER — CARDPULM VISIT (OUTPATIENT)
Dept: CARDIAC REHAB | Facility: HOSPITAL | Age: 69
End: 2024-09-17
Attending: INTERNAL MEDICINE
Payer: MEDICARE

## 2024-09-17 PROCEDURE — 93798 PHYS/QHP OP CAR RHAB W/ECG: CPT

## 2024-09-19 ENCOUNTER — CARDPULM VISIT (OUTPATIENT)
Dept: CARDIAC REHAB | Facility: HOSPITAL | Age: 69
End: 2024-09-19
Attending: INTERNAL MEDICINE
Payer: MEDICARE

## 2024-09-19 PROCEDURE — 93798 PHYS/QHP OP CAR RHAB W/ECG: CPT

## 2024-09-24 ENCOUNTER — CARDPULM VISIT (OUTPATIENT)
Dept: CARDIAC REHAB | Facility: HOSPITAL | Age: 69
End: 2024-09-24
Attending: INTERNAL MEDICINE
Payer: MEDICARE

## 2024-09-24 ENCOUNTER — LAB ENCOUNTER (OUTPATIENT)
Dept: LAB | Age: 69
End: 2024-09-24
Attending: INTERNAL MEDICINE
Payer: MEDICARE

## 2024-09-24 ENCOUNTER — NURSE ONLY (OUTPATIENT)
Dept: ALLERGY | Facility: CLINIC | Age: 69
End: 2024-09-24

## 2024-09-24 DIAGNOSIS — J30.89 ENVIRONMENTAL AND SEASONAL ALLERGIES: Primary | ICD-10-CM

## 2024-09-24 DIAGNOSIS — I10 ESSENTIAL HYPERTENSION, BENIGN: ICD-10-CM

## 2024-09-24 DIAGNOSIS — E11.9 DIABETES MELLITUS WITHOUT COMPLICATION (HCC): ICD-10-CM

## 2024-09-24 DIAGNOSIS — E78.00 PURE HYPERCHOLESTEROLEMIA: ICD-10-CM

## 2024-09-24 DIAGNOSIS — Z12.5 PROSTATE CANCER SCREENING: ICD-10-CM

## 2024-09-24 LAB
ALBUMIN SERPL-MCNC: 4.6 G/DL (ref 3.2–4.8)
ALBUMIN/GLOB SERPL: 1.8 {RATIO} (ref 1–2)
ALP LIVER SERPL-CCNC: 57 U/L
ALT SERPL-CCNC: 22 U/L
ANION GAP SERPL CALC-SCNC: 3 MMOL/L (ref 0–18)
AST SERPL-CCNC: 25 U/L (ref ?–34)
BASOPHILS # BLD AUTO: 0.03 X10(3) UL (ref 0–0.2)
BASOPHILS NFR BLD AUTO: 0.5 %
BILIRUB SERPL-MCNC: 0.7 MG/DL (ref 0.2–1.1)
BUN BLD-MCNC: 22 MG/DL (ref 9–23)
BUN/CREAT SERPL: 23.2 (ref 10–20)
CALCIUM BLD-MCNC: 9.9 MG/DL (ref 8.7–10.4)
CHLORIDE SERPL-SCNC: 105 MMOL/L (ref 98–112)
CHOLEST SERPL-MCNC: 111 MG/DL (ref ?–200)
CO2 SERPL-SCNC: 31 MMOL/L (ref 21–32)
COMPLEXED PSA SERPL-MCNC: 1.67 NG/ML (ref ?–4)
CREAT BLD-MCNC: 0.95 MG/DL
CREAT UR-SCNC: 78.8 MG/DL
DEPRECATED RDW RBC AUTO: 43.3 FL (ref 35.1–46.3)
EGFRCR SERPLBLD CKD-EPI 2021: 86 ML/MIN/1.73M2 (ref 60–?)
EOSINOPHIL # BLD AUTO: 0.12 X10(3) UL (ref 0–0.7)
EOSINOPHIL NFR BLD AUTO: 1.9 %
ERYTHROCYTE [DISTWIDTH] IN BLOOD BY AUTOMATED COUNT: 12.6 % (ref 11–15)
FASTING PATIENT LIPID ANSWER: YES
FASTING STATUS PATIENT QL REPORTED: YES
GLOBULIN PLAS-MCNC: 2.6 G/DL (ref 2–3.5)
GLUCOSE BLD-MCNC: 152 MG/DL (ref 70–99)
HCT VFR BLD AUTO: 41.8 %
HDLC SERPL-MCNC: 53 MG/DL (ref 40–59)
HGB BLD-MCNC: 14.2 G/DL
IMM GRANULOCYTES # BLD AUTO: 0.01 X10(3) UL (ref 0–1)
IMM GRANULOCYTES NFR BLD: 0.2 %
LDLC SERPL CALC-MCNC: 46 MG/DL (ref ?–100)
LYMPHOCYTES # BLD AUTO: 1.31 X10(3) UL (ref 1–4)
LYMPHOCYTES NFR BLD AUTO: 20.5 %
MCH RBC QN AUTO: 31.7 PG (ref 26–34)
MCHC RBC AUTO-ENTMCNC: 34 G/DL (ref 31–37)
MCV RBC AUTO: 93.3 FL
MICROALBUMIN UR-MCNC: 2.3 MG/DL
MICROALBUMIN/CREAT 24H UR-RTO: 29.2 UG/MG (ref ?–30)
MONOCYTES # BLD AUTO: 0.4 X10(3) UL (ref 0.1–1)
MONOCYTES NFR BLD AUTO: 6.3 %
NEUTROPHILS # BLD AUTO: 4.53 X10 (3) UL (ref 1.5–7.7)
NEUTROPHILS # BLD AUTO: 4.53 X10(3) UL (ref 1.5–7.7)
NEUTROPHILS NFR BLD AUTO: 70.6 %
NONHDLC SERPL-MCNC: 58 MG/DL (ref ?–130)
OSMOLALITY SERPL CALC.SUM OF ELEC: 294 MOSM/KG (ref 275–295)
PLATELET # BLD AUTO: 199 10(3)UL (ref 150–450)
POTASSIUM SERPL-SCNC: 3.9 MMOL/L (ref 3.5–5.1)
PROT SERPL-MCNC: 7.2 G/DL (ref 5.7–8.2)
RBC # BLD AUTO: 4.48 X10(6)UL
SODIUM SERPL-SCNC: 139 MMOL/L (ref 136–145)
T4 FREE SERPL-MCNC: 1.1 NG/DL (ref 0.8–1.7)
TRIGL SERPL-MCNC: 53 MG/DL (ref 30–149)
TSI SER-ACNC: 1.09 MIU/ML (ref 0.55–4.78)
VLDLC SERPL CALC-MCNC: 7 MG/DL (ref 0–30)
WBC # BLD AUTO: 6.4 X10(3) UL (ref 4–11)

## 2024-09-24 PROCEDURE — 95117 IMMUNOTHERAPY INJECTIONS: CPT | Performed by: ALLERGY & IMMUNOLOGY

## 2024-09-24 PROCEDURE — 85025 COMPLETE CBC W/AUTO DIFF WBC: CPT

## 2024-09-24 PROCEDURE — 82043 UR ALBUMIN QUANTITATIVE: CPT

## 2024-09-24 PROCEDURE — 84439 ASSAY OF FREE THYROXINE: CPT

## 2024-09-24 PROCEDURE — 82570 ASSAY OF URINE CREATININE: CPT

## 2024-09-24 PROCEDURE — 84443 ASSAY THYROID STIM HORMONE: CPT

## 2024-09-24 PROCEDURE — 36415 COLL VENOUS BLD VENIPUNCTURE: CPT

## 2024-09-24 PROCEDURE — 93798 PHYS/QHP OP CAR RHAB W/ECG: CPT

## 2024-09-24 PROCEDURE — 80053 COMPREHEN METABOLIC PANEL: CPT

## 2024-09-24 PROCEDURE — 80061 LIPID PANEL: CPT

## 2024-09-25 RX ORDER — GLIMEPIRIDE 4 MG/1
TABLET ORAL
Qty: 180 TABLET | Refills: 0 | OUTPATIENT
Start: 2024-09-25

## 2024-09-25 NOTE — TELEPHONE ENCOUNTER
REASON FOR REFUSAL: Medication was discontinued 8/19/24 by Endocrinology.    Per Patient Message encounter by BERNARD Ng, Endocrinology 8/19/24:  Bill,   No, let's stop the Glimepiride (or continue to hold it if you haven't been taking it). I don't think you need that medication any more.   See you then!  Celina     Requested Prescriptions     Pending Prescriptions Disp Refills    GLIMEPIRIDE 4 MG Oral Tab [Pharmacy Med Name: Glimepiride 4 Mg Tab ] 180 tablet 0     Sig: Take 1 tablet (4 mg total) by mouth every morning before breakfast AND 1 tablet (4 mg total) before evening meal.

## 2024-09-26 ENCOUNTER — CARDPULM VISIT (OUTPATIENT)
Dept: CARDIAC REHAB | Facility: HOSPITAL | Age: 69
End: 2024-09-26
Attending: INTERNAL MEDICINE
Payer: MEDICARE

## 2024-09-26 PROCEDURE — 93798 PHYS/QHP OP CAR RHAB W/ECG: CPT

## 2024-10-01 ENCOUNTER — CARDPULM VISIT (OUTPATIENT)
Dept: CARDIAC REHAB | Facility: HOSPITAL | Age: 69
End: 2024-10-01
Attending: INTERNAL MEDICINE
Payer: MEDICARE

## 2024-10-01 PROCEDURE — 93798 PHYS/QHP OP CAR RHAB W/ECG: CPT

## 2024-10-03 ENCOUNTER — CARDPULM VISIT (OUTPATIENT)
Dept: CARDIAC REHAB | Facility: HOSPITAL | Age: 69
End: 2024-10-03
Attending: INTERNAL MEDICINE
Payer: MEDICARE

## 2024-10-03 PROCEDURE — 93798 PHYS/QHP OP CAR RHAB W/ECG: CPT

## 2024-10-08 ENCOUNTER — CARDPULM VISIT (OUTPATIENT)
Dept: CARDIAC REHAB | Facility: HOSPITAL | Age: 69
End: 2024-10-08
Attending: INTERNAL MEDICINE
Payer: MEDICARE

## 2024-10-08 PROCEDURE — 93798 PHYS/QHP OP CAR RHAB W/ECG: CPT

## 2024-10-10 ENCOUNTER — CARDPULM VISIT (OUTPATIENT)
Dept: CARDIAC REHAB | Facility: HOSPITAL | Age: 69
End: 2024-10-10
Attending: INTERNAL MEDICINE
Payer: MEDICARE

## 2024-10-10 PROCEDURE — 93798 PHYS/QHP OP CAR RHAB W/ECG: CPT

## 2024-10-15 ENCOUNTER — CARDPULM VISIT (OUTPATIENT)
Dept: CARDIAC REHAB | Facility: HOSPITAL | Age: 69
End: 2024-10-15
Attending: INTERNAL MEDICINE
Payer: MEDICARE

## 2024-10-15 PROCEDURE — 93798 PHYS/QHP OP CAR RHAB W/ECG: CPT

## 2024-10-17 ENCOUNTER — CARDPULM VISIT (OUTPATIENT)
Dept: CARDIAC REHAB | Facility: HOSPITAL | Age: 69
End: 2024-10-17
Attending: INTERNAL MEDICINE
Payer: MEDICARE

## 2024-10-17 PROCEDURE — 93798 PHYS/QHP OP CAR RHAB W/ECG: CPT

## 2024-10-22 ENCOUNTER — NURSE ONLY (OUTPATIENT)
Dept: ALLERGY | Facility: CLINIC | Age: 69
End: 2024-10-22

## 2024-10-22 ENCOUNTER — CARDPULM VISIT (OUTPATIENT)
Dept: CARDIAC REHAB | Facility: HOSPITAL | Age: 69
End: 2024-10-22
Attending: INTERNAL MEDICINE
Payer: MEDICARE

## 2024-10-22 DIAGNOSIS — J30.89 ENVIRONMENTAL AND SEASONAL ALLERGIES: Primary | ICD-10-CM

## 2024-10-22 PROCEDURE — 93798 PHYS/QHP OP CAR RHAB W/ECG: CPT

## 2024-10-22 PROCEDURE — 95117 IMMUNOTHERAPY INJECTIONS: CPT | Performed by: ALLERGY & IMMUNOLOGY

## 2024-10-24 ENCOUNTER — CARDPULM VISIT (OUTPATIENT)
Dept: CARDIAC REHAB | Facility: HOSPITAL | Age: 69
End: 2024-10-24
Attending: INTERNAL MEDICINE
Payer: MEDICARE

## 2024-10-24 PROCEDURE — 93798 PHYS/QHP OP CAR RHAB W/ECG: CPT

## 2024-10-29 ENCOUNTER — CARDPULM VISIT (OUTPATIENT)
Dept: CARDIAC REHAB | Facility: HOSPITAL | Age: 69
End: 2024-10-29
Attending: INTERNAL MEDICINE
Payer: MEDICARE

## 2024-10-29 PROCEDURE — 93798 PHYS/QHP OP CAR RHAB W/ECG: CPT

## 2024-10-31 ENCOUNTER — CARDPULM VISIT (OUTPATIENT)
Dept: CARDIAC REHAB | Facility: HOSPITAL | Age: 69
End: 2024-10-31
Attending: INTERNAL MEDICINE
Payer: MEDICARE

## 2024-10-31 PROCEDURE — 93798 PHYS/QHP OP CAR RHAB W/ECG: CPT

## 2024-11-01 ENCOUNTER — PATIENT MESSAGE (OUTPATIENT)
Dept: INTERNAL MEDICINE CLINIC | Facility: CLINIC | Age: 69
End: 2024-11-01

## 2024-11-01 DIAGNOSIS — E11.9 DIABETES MELLITUS WITHOUT COMPLICATION (HCC): Primary | ICD-10-CM

## 2024-11-05 ENCOUNTER — CARDPULM VISIT (OUTPATIENT)
Dept: CARDIAC REHAB | Facility: HOSPITAL | Age: 69
End: 2024-11-05
Attending: INTERNAL MEDICINE
Payer: MEDICARE

## 2024-11-05 PROCEDURE — 93798 PHYS/QHP OP CAR RHAB W/ECG: CPT

## 2024-11-07 ENCOUNTER — CARDPULM VISIT (OUTPATIENT)
Dept: CARDIAC REHAB | Facility: HOSPITAL | Age: 69
End: 2024-11-07
Attending: INTERNAL MEDICINE
Payer: MEDICARE

## 2024-11-07 PROCEDURE — 93798 PHYS/QHP OP CAR RHAB W/ECG: CPT

## 2024-11-12 ENCOUNTER — APPOINTMENT (OUTPATIENT)
Dept: CARDIAC REHAB | Facility: HOSPITAL | Age: 69
End: 2024-11-12
Attending: INTERNAL MEDICINE
Payer: MEDICARE

## 2024-11-14 ENCOUNTER — APPOINTMENT (OUTPATIENT)
Dept: CARDIAC REHAB | Facility: HOSPITAL | Age: 69
End: 2024-11-14
Attending: INTERNAL MEDICINE
Payer: MEDICARE

## 2024-11-17 NOTE — TELEPHONE ENCOUNTER
Refill passed per [facility] protocol.    Requested Prescriptions   Pending Prescriptions Disp Refills    SERTRALINE 50 MG Oral Tab [Pharmacy Med Name: Sertraline Hydrochloride 50 Mg Tab Nort] 90 tablet 0     Sig: Take 1 tablet (50 mg total) by mouth daily.       Psychiatric Non-Scheduled (Anti-Anxiety) Passed - 11/17/2024  9:25 AM        Passed - In person appointment or virtual visit in the past 6 mos or appointment in next 3 mos     Recent Outpatient Visits              3 weeks ago Environmental and seasonal allergies [J30.89]    [facility]    Nurse Only    1 month ago Environmental and seasonal allergies    [facility]    Nurse Only    2 months ago Medicare annual wellness visit, subsequent    [facility] Sincere Greenberg MD    Office Visit    2 months ago Diabetes mellitus without complication (HCC)    [facility] Bekah Ng APRN    Office Visit    2 months ago Environmental and seasonal allergies    [facility]    Nurse Only          Future Appointments         Provider Department Appt Notes    In 2 days  ALLERGY [facility]     In 2 days ADO SCHEDULED [facility]     In 2 weeks Bekah Ng APRN [facility] 3 months    In 1 month  ALLERGY [facility]     In 1 month Rita Morrison DPM [facility] Diabetes foot check                    Passed - Depression Screening completed within the past 12 months             Future Appointments         Provider Department Appt Notes    In 2 days  ALLERGY [facility]  [place]     In 2 days ADO SCHEDULED RESOURCE [facility]     In 2 weeks Bekah Ng APRN [facility] 3 months    In 1 month EC ALLERGY [facility]     In 1 month Rita Morrison DPM [facility] Diabetes foot check            Recent Outpatient Visits              3 weeks ago Environmental and seasonal allergies [J30.89]    [facility]    Nurse Only    1 month ago Environmental and seasonal allergies    [facility]    Nurse Only    2 months ago Medicare annual wellness visit, subsequent    [facility] Sincere Greenberg MD    Office Visit    2 months ago Diabetes mellitus without complication (HCC)    [facility] Bekah Ng APRN    Office Visit    2 months ago Environmental and seasonal allergies    [facility]    Nurse Only

## 2024-11-19 ENCOUNTER — APPOINTMENT (OUTPATIENT)
Dept: CARDIAC REHAB | Facility: HOSPITAL | Age: 69
End: 2024-11-19
Attending: INTERNAL MEDICINE
Payer: MEDICARE

## 2024-11-19 ENCOUNTER — LAB ENCOUNTER (OUTPATIENT)
Dept: LAB | Age: 69
End: 2024-11-19
Attending: INTERNAL MEDICINE
Payer: MEDICARE

## 2024-11-19 ENCOUNTER — NURSE ONLY (OUTPATIENT)
Dept: ALLERGY | Facility: CLINIC | Age: 69
End: 2024-11-19

## 2024-11-19 DIAGNOSIS — E11.9 DIABETES MELLITUS WITHOUT COMPLICATION (HCC): ICD-10-CM

## 2024-11-19 DIAGNOSIS — J30.89 ENVIRONMENTAL AND SEASONAL ALLERGIES: Primary | ICD-10-CM

## 2024-11-19 LAB
EST. AVERAGE GLUCOSE BLD GHB EST-MCNC: 146 MG/DL (ref 68–126)
HBA1C MFR BLD: 6.7 % (ref ?–5.7)

## 2024-11-19 PROCEDURE — 83036 HEMOGLOBIN GLYCOSYLATED A1C: CPT

## 2024-11-19 PROCEDURE — 36415 COLL VENOUS BLD VENIPUNCTURE: CPT

## 2024-11-19 PROCEDURE — 95117 IMMUNOTHERAPY INJECTIONS: CPT | Performed by: ALLERGY & IMMUNOLOGY

## 2024-11-19 NOTE — TELEPHONE ENCOUNTER
Refill passed per [facility] protocol.    Please review pended refill request as unable to refill due to high/very high interaction warning copied here:    Current Warnings (1 unfiltered, 7 filtered)Show filtered (7)  High  Drug-Drug: traMADol and sertralineSerotonergic effects may be additive when tramadol and selective serotonin reuptake inhibitors (SSRIs) are coadministered. The risk of serotonin syndrome/toxicity may be increased.  Details  Override reason        sertraline 50 MG Oral Tab [Pharmacy Med Name: Sertraline Hydrochloride 50 Mg Tab Nort]  Prescription. Reordered.  traMADol 50 MG Oral TabPrescription. Active.    Requested Prescriptions   Pending Prescriptions Disp Refills    SERTRALINE 50 MG Oral Tab [Pharmacy Med Name: Sertraline Hydrochloride 50 Mg Tab Nort] 90 tablet 0     Sig: Take 1 tablet (50 mg total) by mouth daily.       Psychiatric Non-Scheduled (Anti-Anxiety) Passed - 11/19/2024  8:18 AM        Passed - In person appointment or virtual visit in the past 6 mos or appointment in next 3 mos     Recent Outpatient Visits              4 weeks ago Environmental and seasonal allergies [J30.89]    [facility]    Nurse Only    1 month ago Environmental and seasonal allergies    [facility]    Nurse Only    2 months ago Medicare annual wellness visit, subsequent    [facility] Sincere Greenberg MD    Office Visit    2 months ago Diabetes mellitus without complication (HCC)    [facility] Bekah Ng APRN    Office Visit    2 months ago Environmental and seasonal allergies    [facility]    Nurse Only          Future Appointments         Provider Department Appt Notes    Today EC ALLERGY [facility]     Today ADO SCHEDULED  RESOURCE [facility]     In 2 weeks Bekah Ng APRN [facility] 3 months    In 4 weeks [facility]     In 1 month Rita Morrison DPM [facility] Diabetes foot check                    Passed - Depression Screening completed within the past 12 months           Recent Outpatient Visits              4 weeks ago Environmental and seasonal allergies [J30.89]    [facility]    Nurse Only    1 month ago Environmental and seasonal allergies    [facility] [address], [place]    Nurse Only    2 months ago Medicare annual wellness visit, subsequent    [facility] Sincere Greenberg MD    Office Visit    2 months ago Diabetes mellitus without complication (HCC)    [facility] Bekah Ng APRN    Office Visit    2 months ago Environmental and seasonal allergies    [facility]    Nurse Only          Future Appointments         Provider Department Appt Notes    Today EC ALLERGY [facility], [facility], [place]     Today ADO SCHEDULED RESOURCE [facility]     In 2 weeks Bekah Ng APRN [facility] 3 months    In 4 weeks EC [facility]     In 1 month Rita Morrison DPM [facility] Diabetes foot check

## 2024-11-21 ENCOUNTER — APPOINTMENT (OUTPATIENT)
Dept: CARDIAC REHAB | Facility: HOSPITAL | Age: 69
End: 2024-11-21
Attending: INTERNAL MEDICINE
Payer: MEDICARE

## 2024-11-26 ENCOUNTER — APPOINTMENT (OUTPATIENT)
Dept: CARDIAC REHAB | Facility: HOSPITAL | Age: 69
End: 2024-11-26
Attending: INTERNAL MEDICINE
Payer: MEDICARE

## 2024-11-28 ENCOUNTER — APPOINTMENT (OUTPATIENT)
Dept: CARDIAC REHAB | Facility: HOSPITAL | Age: 69
End: 2024-11-28
Attending: INTERNAL MEDICINE
Payer: MEDICARE

## 2024-12-03 ENCOUNTER — APPOINTMENT (OUTPATIENT)
Dept: CARDIAC REHAB | Facility: HOSPITAL | Age: 69
End: 2024-12-03
Attending: INTERNAL MEDICINE
Payer: MEDICARE

## 2024-12-04 ENCOUNTER — OFFICE VISIT (OUTPATIENT)
Dept: ENDOCRINOLOGY CLINIC | Facility: CLINIC | Age: 69
End: 2024-12-04

## 2024-12-04 VITALS
HEART RATE: 65 BPM | SYSTOLIC BLOOD PRESSURE: 125 MMHG | BODY MASS INDEX: 26.88 KG/M2 | WEIGHT: 202.81 LBS | DIASTOLIC BLOOD PRESSURE: 84 MMHG | HEIGHT: 73 IN

## 2024-12-04 DIAGNOSIS — E11.9 CONTROLLED TYPE 2 DIABETES MELLITUS WITHOUT COMPLICATION, WITHOUT LONG-TERM CURRENT USE OF INSULIN (HCC): Primary | ICD-10-CM

## 2024-12-04 LAB
GLUCOSE BLOOD: 148
TEST STRIP LOT #: NORMAL NUMERIC

## 2024-12-04 PROCEDURE — 82947 ASSAY GLUCOSE BLOOD QUANT: CPT

## 2024-12-04 PROCEDURE — 99214 OFFICE O/P EST MOD 30 MIN: CPT

## 2024-12-04 NOTE — PROGRESS NOTES
Name: Jose Simon Jr.  Date: 12/4/24    Referring Physician: No ref. provider found    HISTORY OF PRESENT ILLNESS   Jose Simon Jr. is a 69 year old male who presents for follow up for diabetes mellitus     Diabetes History:  Diagnosed- >20 years ago   Patient has not had hospitalizations for blood sugar issues    Prior glycohemoglobin were 7.2% 5/2024; 7.6% 7/2024; 6.7% 11/2024   Glucose in clinic today - 148 mg/dl     Dietary compliance: Good watches diet closely and follows low CHO diet- counts carbs daily and generally eating between  grams of carbs daily rarely eating fast food and if he does will get salad. Avoids pasta and rice.      Recall:  Breakfast- power bar (9gm), or yogurt with cheese (babybel), or occ eggs with 2 pieces of wheat toast   Lunch- beef jerkey sticks with cheese, or sandwich on small zuri (low CHO), with low sodium turkey with cheese   Dinner- salad 4-5 times weekly, low CHO, sugar free dressing. Low CHO vegetables with salad and cheese, or fish 1-2 times weekly, occ. Pork or beef, chicken with sauce.   Snack- crispy pastry ( 4-5 pieces, 20 gram of carb), or protein drink (low carb), fruit,   Beverages- water with flavoring, 1-2 diet soda, coffee- decaf     Exercise: Yes- watching [employer], [employer] in summer months, volunteers at [employer] a lot of walking, treadmill in winter months 3-5 days weekly.     Polyuria/polydipsia: No   Blurred vision: No    Episodes of hypoglycemia: None    Blood Glucose:  Checking 2-3 times per day  Logs scanned to chart: below are readings for last 1-2 months    Fasting- 117-140's   Evening- 's     Current DM Regimen:  Mounjaro 5mg subcutaneous weekly - tolerating medication  Metformin 2000 mg with dinner meal     Modifying factors:  Medication adherence: Yes   Recent steroids, illness or infections: URI-in the last 2 weeks       REVIEW OF SYSTEMS  Eyes: Diabetic retinopathy present: No            Most recent visit to eye doctor in last 12  months: Yes- Dr. Carpenter last visit 9/2023- has appt this month.     CV: Cardiovascular disease present: Yes- s/p stent 5/2024         Hypertension present: Yes, well controlled          Hyperlipidemia present: No         Peripheral Vascular Disease present: No    : Nephropathy present: Yes    Neuro: Neuropathy present: Yes- numbness in toes - sees podiatry- next appt 1/2025    Skin: Infection or ulceration: No    Osteoporosis: No    Thyroid disease: No      Medications:     Current Outpatient Medications:     Tirzepatide (MOUNJARO) 5 MG/0.5ML Subcutaneous Solution Pen-injector, Inject 5 mg into the skin once a week., Disp: 2 mL, Rfl: 5    metFORMIN  MG Oral Tablet 24 Hr, Take 4 tablets (2,000 mg total) by mouth every evening., Disp: 360 tablet, Rfl: 3    sertraline 50 MG Oral Tab, Take 1 tablet (50 mg total) by mouth daily., Disp: 90 tablet, Rfl: 3    prasugrel 10 MG Oral Tab, Take 1 tablet (10 mg total) by mouth daily., Disp: , Rfl:     Bacillus Coagulans-Inulin (PROBIOTIC) 1-250 BILLION-MG Oral Cap, , Disp: , Rfl:     Cholecalciferol 50 MCG (2000 UT) Oral Tab, Vitamin D3 50 mcg (2,000 unit) tablet, [RxNorm: 156412], Disp: , Rfl:     Vitamin C 500 MG Oral Tab, Vitamin C 500 mg tablet, [RxNorm: 785036], Disp: , Rfl:     Cinnamon 500 MG Oral Cap, Cinnamon 500 mg capsule, [RxNorm: 310452], Disp: , Rfl:     Glucose Blood (CONTOUR NEXT TEST) In Vitro Strip, Use to check blood sugar twice daily, Disp: 200 each, Rfl: 3    Microlet Lancets Does not apply Misc, Use to check blood sugar twice daily, Disp: 200 each, Rfl: 3    Lancets Does not apply Misc, Test two times daily, Disp: 200 each, Rfl: 2    Glucose Blood (BLOOD GLUCOSE TEST STRIPS 333) In Vitro Strip, 1 each by In Vitro route 2 (two) times daily., Disp: 200 strip, Rfl: 3    levocetirizine 5 MG Oral Tab, TAKE ONE TABLET BY MOUTH NIGHTLY, Disp: 90 tablet, Rfl: 1    ondansetron (ZOFRAN) 4 mg tablet, Take 1 tablet (4 mg total) by mouth every 8 (eight) hours  as needed for Nausea., Disp: 10 tablet, Rfl: 2    ALPRAZolam (XANAX) 0.5 MG Oral Tab, Take one an hour prior to flying and repeat if necessary for anxiety ., Disp: 10 tablet, Rfl: 0    atorvastatin 40 MG Oral Tab, Take 1 tablet (40 mg total) by mouth nightly., Disp: 90 tablet, Rfl: 3    carvedilol 6.25 MG Oral Tab, Take 1 tablet (6.25 mg total) by mouth 2 (two) times daily with meals., Disp: , Rfl:     montelukast 10 MG Oral Tab, Take 1 tablet (10 mg total) by mouth nightly., Disp: 90 tablet, Rfl: 3    losartan-hydroCHLOROthiazide 100-25 MG Oral Tab, Take 1 tablet by mouth daily., Disp: 90 tablet, Rfl: 3    tamsulosin 0.4 MG Oral Cap, Take 1 capsule (0.4 mg total) by mouth nightly. TAKE AT BEDTIME, Disp: 90 capsule, Rfl: 3    traMADol 50 MG Oral Tab, TAKE ONE TABLET BY MOUTH EVERY SIX HOURS AS NEEDED FOR PAIN, Disp: 50 tablet, Rfl: 5    albuterol (PROAIR HFA) 108 (90 Base) MCG/ACT Inhalation Aero Soln, Inhale 2 puffs into the lungs every 4 to 6 hours as needed for Wheezing., Disp: 3 each, Rfl: 3    zolpidem 5 MG Oral Tab, Take 1 tablet (5 mg total) by mouth nightly as needed for Sleep., Disp: 20 tablet, Rfl: 0    SUMAtriptan Succinate 100 MG Oral Tab, Take 1 tab as needed for headache, may repeat in 2 hours, maximum 2 tabs in 24 hours., Disp: 9 tablet, Rfl: 2    pseudoephedrine  MG Oral Tablet 12 Hr, Take 1 tablet (120 mg total) by mouth every 12 (twelve) hours., Disp: 60 tablet, Rfl: 3    LORATADINE 10 MG Oral Tab, TAKE ONE TABLET BY MOUTH ONE TIME DAILY  (Patient taking differently: No sig reported), Disp: 90 tablet, Rfl: 2    aspirin 81 MG Oral Tab, Take 1 tablet (81 mg total) by mouth daily., Disp: , Rfl:     Multiple Vitamins-Minerals (MULTIVITAMIN ADULT OR), Take 1 tablet by mouth daily., Disp: , Rfl:     Glucose Blood (SEPIDEH CONTOUR TEST) In Vitro Strip, Test twice a day, Disp: 100 each, Rfl: 11    SEPIDEH MICROLET LANCETS Does not apply Misc, Test twice a day, Disp: 100 each, Rfl: 11    ALPRAZolam 0.5 MG  Oral Tab, Take 1 tablet (0.5 mg total) by mouth daily., Disp: , Rfl:     Omega-3 Fatty Acids (OMEGA-3 FISH OIL) 1200 MG Oral Cap, Take  by mouth., Disp: , Rfl:      Allergies:   Allergies   Allergen Reactions    Cats Claw, Uncaria Tomentosa ITCHING     Dander     Mold Runny nose    Ragweed ITCHING       Social History:   Social History     Socioeconomic History    Marital status:    Tobacco Use    Smoking status: Never    Smokeless tobacco: Never   Vaping Use    Vaping status: Never Used   Substance and Sexual Activity    Alcohol use: Not Currently     Alcohol/week: 0.0 standard drinks of alcohol     Comment: socially    Drug use: No       Medical History:   Past Medical History:    Allergic rhinitis    Asthma (HCC)    Back problem    Degenerative cervical disc    Diabetes (HCC)    Essential hypertension    Lipid screening    Migraine headache    Migraines    Peptic ulcer disease    Seasonal allergies    Visual impairment    glasses       Surgical history:   Past Surgical History:   Procedure Laterality Date    Colonoscopy  2012    repeat in 2017    Colonoscopy  2017    repeat 2024    Colonoscopy N/A 4/18/2024    Procedure: COLONOSCOPY;  Surgeon: Florin Corey MD;  [facility]    Vasectomy           PHYSICAL EXAM  Vitals:    12/04/24 0842   BP: 125/84   Pulse: 65   Weight: 202 lb 12.8 oz (92 kg)   Height: 6' 1\" (1.854 m)         General Appearance:  alert, well developed, in no acute distress  Eyes:  normal conjunctivae, sclera, and normal pupils  Neck: Trachea midline: Normal  Back: no kyphosis or back tenderness  Lymph Nodes:  No abnormal nodes noted  Musculoskeletal:  normal muscle strength and tone  Skin:  normal moisture and skin texture  Hair & Nails:  normal scalp hair     Hematologic:  no excessive bruising  Neuro:  sensory grossly intact and motor grossly intact.  Psychiatric:  oriented to time, self, and place  Nutritional:  no abnormal weight gain or  loss      ASSESSMENT/PLAN:    Diabetes mellitus type 2 controlled  -HgA1c - 6.7%   -Congratulated patient on improved glycemic control   -Reviewed ABC's of diabetes   - Reviewed pathogenesis of diabetes.   - Reviewed importance of good glycemic control to prevent microvascular and macrovascular complications including nephropathy, neuropathy, retinopathy, and cardiovascular disease.  - Reviewed importance of SBGM- check glucose 1-2 times daily   - Reviewed target glucose goals for patient  fasting and <180 post prandially   - Reviewed importance of following diabetic diet- recommended 135 grams of CHO per day or 45 grams per meal.   - Provided patient education materials    - Reviewed diet at length and agree overall he is doing very well with low CHO diet, incorporates whole grains, higher fiber carbs and avoids starches. Avoids sweetened beverages. Is also choosing lower fat/sodium options recently after having stent placed a month ago. Encouraged him to continue with diet and reviewed carb counting.     - Continue Metformin 2000 mg PO daily.     - Continue Mounjaro to 5mg subcutaneous weekly. Reviewed side effects and risks vs benefits of medication and reviewed pen injection. Reviewed cardiac benefit.     - Continue to check sugars daily.    - normotensive   - Lipids 9/2024- at goal, on statin  - No nephropathy on last lab 9/2024- on losartan   - UTD with optho - sees yearly - next appt scheduled 12/2024  - normal foot exam 9/2024- sees podiatry 1/2025      RTC in 4 months   12/4/24  BERNARD Cage    A total of  30 minutes was spent on obtaining history, reviewing pertinent imaging/labs and specialists notes, evaluating patient, providing multiple treatment options, reinforcing diet/exercise and compliance, and completing documentation.

## 2024-12-09 ENCOUNTER — MED REC SCAN ONLY (OUTPATIENT)
Dept: INTERNAL MEDICINE CLINIC | Facility: CLINIC | Age: 69
End: 2024-12-09

## 2024-12-16 ENCOUNTER — NURSE ONLY (OUTPATIENT)
Dept: ALLERGY | Facility: CLINIC | Age: 69
End: 2024-12-16

## 2024-12-16 DIAGNOSIS — J30.89 ENVIRONMENTAL AND SEASONAL ALLERGIES: Primary | ICD-10-CM

## 2024-12-16 PROCEDURE — 95117 IMMUNOTHERAPY INJECTIONS: CPT | Performed by: ALLERGY & IMMUNOLOGY

## 2025-01-10 ENCOUNTER — OFFICE VISIT (OUTPATIENT)
Dept: PODIATRY CLINIC | Facility: CLINIC | Age: 70
End: 2025-01-10
Payer: MEDICARE

## 2025-01-10 DIAGNOSIS — E11.42 TYPE 2 DIABETES MELLITUS WITH POLYNEUROPATHY (HCC): Primary | ICD-10-CM

## 2025-01-10 PROCEDURE — 99213 OFFICE O/P EST LOW 20 MIN: CPT | Performed by: STUDENT IN AN ORGANIZED HEALTH CARE EDUCATION/TRAINING PROGRAM

## 2025-01-10 NOTE — PROGRESS NOTES
[facility] Podiatry  Progress Note      Jose Simon Jr. is a 69 year old male.   Chief Complaint   Patient presents with    Diabetic Foot Care     20 m o f/u - was seen by EM on 4/18/23 - last [id number] from 11/19/24 and LOV with endo BERNARD Berenice was 12/4/24 - has no c/o regarding his feet - this AM his HG=[id number]               HPI:     Patient is a pleasant 69-year-old non-insulin-dependent diabetic male presents to clinic for bilateral foot evaluation.  Denies any pedal injuries or trauma.  Denies any foot pain.    Allergies: Cats claw, uncaria tomentosa; Mold; and Ragweed    Current Outpatient Medications   Medication Sig Dispense Refill    sertraline 50 MG Oral Tab Take 1 tablet (50 mg total) by mouth daily. 90 tablet 3    Tirzepatide (MOUNJARO) 5 MG/0.5ML Subcutaneous Solution Pen-injector Inject 5 mg into the skin once a week. 2 mL 5    prasugrel 10 MG Oral Tab Take 1 tablet (10 mg total) by mouth daily.      Bacillus Coagulans-Inulin (PROBIOTIC) 1-250 BILLION-MG Oral Cap       Cholecalciferol 50 MCG (2000 UT) Oral Tab Vitamin D3 50 mcg (2,000 unit) tablet, [RxNorm: 946145]      Vitamin C 500 MG Oral Tab Vitamin C 500 mg tablet, [RxNorm: 101879]      Cinnamon 500 MG Oral Cap Cinnamon 500 mg capsule, [RxNorm: 871923]      Glucose Blood (CONTOUR NEXT TEST) In Vitro Strip Use to check blood sugar twice daily 200 each 3    Microlet Lancets Does not apply Misc Use to check blood sugar twice daily 200 each 3    Lancets Does not apply Misc Test two times daily 200 each 2    Glucose Blood (BLOOD GLUCOSE TEST STRIPS 333) In Vitro Strip 1 each by In Vitro route 2 (two) times daily. 200 strip 3    levocetirizine 5 MG Oral Tab TAKE ONE TABLET BY MOUTH NIGHTLY 90 tablet 1    metFORMIN  MG Oral Tablet 24 Hr Take 4 tablets (2,000 mg total) by mouth every evening. 360 tablet 3    ondansetron (ZOFRAN) 4 mg tablet Take 1 tablet (4 mg total) by mouth every 8 (eight) hours as needed for Nausea. 10 tablet 2    ALPRAZolam  (XANAX) 0.5 MG Oral Tab Take one an hour prior to flying and repeat if necessary for anxiety . 10 tablet 0    atorvastatin 40 MG Oral Tab Take 1 tablet (40 mg total) by mouth nightly. 90 tablet 3    carvedilol 6.25 MG Oral Tab Take 1 tablet (6.25 mg total) by mouth 2 (two) times daily with meals.      montelukast 10 MG Oral Tab Take 1 tablet (10 mg total) by mouth nightly. 90 tablet 3    losartan-hydroCHLOROthiazide 100-25 MG Oral Tab Take 1 tablet by mouth daily. 90 tablet 3    tamsulosin 0.4 MG Oral Cap Take 1 capsule (0.4 mg total) by mouth nightly. TAKE AT BEDTIME 90 capsule 3    traMADol 50 MG Oral Tab TAKE ONE TABLET BY MOUTH EVERY SIX HOURS AS NEEDED FOR PAIN 50 tablet 5    albuterol (PROAIR HFA) 108 (90 Base) MCG/ACT Inhalation Aero Soln Inhale 2 puffs into the lungs every 4 to 6 hours as needed for Wheezing. 3 each 3    zolpidem 5 MG Oral Tab Take 1 tablet (5 mg total) by mouth nightly as needed for Sleep. 20 tablet 0    SUMAtriptan Succinate 100 MG Oral Tab Take 1 tab as needed for headache, may repeat in 2 hours, maximum 2 tabs in 24 hours. 9 tablet 2    pseudoephedrine  MG Oral Tablet 12 Hr Take 1 tablet (120 mg total) by mouth every 12 (twelve) hours. 60 tablet 3    LORATADINE 10 MG Oral Tab TAKE ONE TABLET BY MOUTH ONE TIME DAILY  (Patient taking differently: No sig reported) 90 tablet 2    aspirin 81 MG Oral Tab Take 1 tablet (81 mg total) by mouth daily.      Multiple Vitamins-Minerals (MULTIVITAMIN ADULT OR) Take 1 tablet by mouth daily.      Glucose Blood (SEPIDEH CONTOUR TEST) In Vitro Strip Test twice a day 100 each 11    SEPIDEH MICROLET LANCETS Does not apply Misc Test twice a day 100 each 11    ALPRAZolam 0.5 MG Oral Tab Take 1 tablet (0.5 mg total) by mouth daily.      Omega-3 Fatty Acids (OMEGA-3 FISH OIL) 1200 MG Oral Cap Take  by mouth.        Past Medical History:    Allergic rhinitis    Asthma (HCC)    Back problem    Degenerative cervical disc    Diabetes (HCC)    Essential  hypertension    Lipid screening    Migraine headache    Migraines    Peptic ulcer disease    Seasonal allergies    Visual impairment    glasses      Past Surgical History:   Procedure Laterality Date    Colonoscopy  2012    repeat in 2017    Colonoscopy  2017    repeat 2024    Colonoscopy N/A 4/18/2024    Procedure: COLONOSCOPY;  Surgeon: Florin Corey MD;  Location: [facility] MAIN OR    Vasectomy        Family History   Problem Relation Age of Onset    Hypertension Father     Diabetes Father     Cancer Father         colon    Heart Disorder Father     Other (Other) Mother         copd    Cancer Daughter         thyroid    Hypertension Son     Cancer Paternal Grandmother         colon      Social History     Socioeconomic History    Marital status:    Tobacco Use    Smoking status: Never    Smokeless tobacco: Never   Vaping Use    Vaping status: Never Used   Substance and Sexual Activity    Alcohol use: Not Currently     Alcohol/week: 0.0 standard drinks of alcohol     Comment: socially    Drug use: No           REVIEW OF SYSTEMS:     Denies nause, fever, chills  No calf pain  Denies chest pain or SOB      EXAM:   There were no vitals taken for this visit.  GENERAL: well developed, well nourished, in no apparent distress  EXTREMITIES:   1. Integument: Normal skin temperature and turgor  2. Vascular: Dorsalis pedis two out of four bilateral and posterior tibial pulses two out of   four bilateral, capillary refill normal.   3. Musculoskeletal: All muscle groups are graded 5 out of 5 in the foot and ankle.   4. Neurological: Normal sharp dull sensation; reflexes normal.    Bilateral barefoot skin diabetic exam is normal, visualized feet and the appearance is normal.  Bilateral monofilament/sensation of both feet is normal.  Pulsation pedal pulse exam of both lower legs/feet is normal as well.             ASSESSMENT AND PLAN:   Diagnoses and all orders for this visit:    Type 2 diabetes mellitus with  polyneuropathy (HCC)        Plan:       -Patient examined, chart history reviewed.  -Discussed importance of proper pedal hygiene, regular foot checks, and tight glucose control.  -Ambulate with supportive shoes and inserts and avoid walking barefoot.  -Educated patient on acute signs of infection advised patient to seek immediate medical attention if symptoms arise.      The patient indicates understanding of these issues and agrees to the plan.        Rita Morrison DPM

## 2025-01-14 ENCOUNTER — NURSE ONLY (OUTPATIENT)
Dept: ALLERGY | Facility: CLINIC | Age: 70
End: 2025-01-14

## 2025-01-14 DIAGNOSIS — J30.89 ENVIRONMENTAL AND SEASONAL ALLERGIES: Primary | ICD-10-CM

## 2025-01-14 PROCEDURE — 95117 IMMUNOTHERAPY INJECTIONS: CPT | Performed by: ALLERGY & IMMUNOLOGY

## 2025-01-15 RX ORDER — LOSARTAN POTASSIUM AND HYDROCHLOROTHIAZIDE 25; 100 MG/1; MG/1
1 TABLET ORAL DAILY
Qty: 90 TABLET | Refills: 0 | Status: SHIPPED | OUTPATIENT
Start: 2025-01-15

## 2025-01-15 NOTE — TELEPHONE ENCOUNTER
Refill passed per [facility] protocol.   Requested Prescriptions   Pending Prescriptions Disp Refills    LOSARTAN-HYDROCHLOROTHIAZIDE 100-25 MG Oral Tab [Pharmacy Med Name: Losartan Potassium/Hydrochlorothiazide 100-25 Mg Tab Solc] 90 tablet 0     Sig: Take 1 tablet by mouth daily.       Hypertension Medications Protocol Passed - 1/15/2025  3:57 PM        Passed - CMP or BMP in past 12 months        Passed - Last BP reading less than 140/90     BP Readings from Last 1 Encounters:   12/04/24 125/84               Passed - In person appointment or virtual visit in the past 12 mos or appointment in next 3 mos     Recent Outpatient Visits              Yesterday Environmental and seasonal allergies    [facility]    Nurse Only    5 days ago Type 2 diabetes mellitus with polyneuropathy ([facility])    [facility], Rita Claudio DPM    Office Visit    1 month ago Environmental and seasonal allergies    [facility]    Nurse Only    1 month ago Controlled type 2 diabetes mellitus without complication, without long-term current use of insulin ([facility])    [facility] Bekah Ng APRN    Office Visit    1 month ago Environmental and seasonal allergies    [facility]    Nurse Only          Future Appointments         Provider Department Appt Notes    In 3 weeks [facility] Allergy Injection    In 1 month [facility] Allergy Injection    In 2 months Bekah Ng APRN [facility] 4 months    In 2 months Basil Irby MD [facility] Allergy follow-up and Allergy Injection.  Patient will  present at 11:10 for injection.                    Passed - EGFRCR or GFRNAA > 50     GFR Evaluation  EGFRCR: 86 , resulted on 9/24/2024          Passed - Medication is active on med list             Recent Outpatient Visits              Yesterday Environmental and seasonal allergies    [facility]    Nurse Only    5 days ago Type 2 diabetes mellitus with polyneuropathy ([facility])    [facility]    Office Visit    1 month ago Environmental and seasonal allergies    [facility]    Nurse Only    1 month ago Controlled type 2 diabetes mellitus without complication, without long-term current use of insulin ([facility])    [facility] Bekah Ng APRN    Office Visit    1 month ago Environmental and seasonal allergies    [facility]    Nurse Only            Future Appointments         Provider Department Appt Notes    In 3 weeks [facility]    In 1 month [facility] Allergy Injection    In 2 months Bekah Ng APRN [facility] 4 months    In 2 months Basil Irby MD [facility] Allergy follow-up and Allergy Injection.  Patient will present at 11:10 for injection.

## 2025-01-29 NOTE — TELEPHONE ENCOUNTER
Please review. Protocol Failed; No Protocol    Requested Prescriptions   Pending Prescriptions Disp Refills    MONTELUKAST 10 MG Oral Tab [Pharmacy Med Name: Montelukast Sodium 10 Mg Tab Auro] 90 tablet 0     Sig: Take 1 tablet (10 mg total) by mouth nightly.       Asthma & COPD Medication Protocol Failed - 1/29/2025  3:14 PM        Failed - ACT Score greater than or equal to 20                Failed - ACT recorded in the last 12 months                Passed - Appointment in past 6 or next 3 months      Recent Outpatient Visits              2 weeks ago Environmental and seasonal allergies    [facility]    Nurse Only    2 weeks ago Type 2 diabetes mellitus with polyneuropathy ([facility])    [facility], Rita Claudio DPM    Office Visit    1 month ago Environmental and seasonal allergies    [facility]    Nurse Only    1 month ago Controlled type 2 diabetes mellitus without complication, without long-term current use of insulin ([facility])    [facility] Bekah Ng APRN    Office Visit    2 months ago Environmental and seasonal allergies    [facility]    Nurse Only          Future Appointments         Provider Department Appt Notes    In 1 week EC [facility] Allergy Injection    In 1 month [facility] Allergy Injection    In 2 months Bekah Ng APRN [facility] 4 months    In 2 months Basil Irby MD [facility] Allergy follow-up and Allergy Injection.  Patient will present at 11:10 for injection.                    Passed - Medication is active on med list               Future  Appointments         Provider Department Appt Notes    In 1 week EC ALLERGY [facility] Allergy Injection    In 1 month EC ALLERGY [facility] Allergy Injection    In 2 months Bekah Ng APRN [facility] 4 months    In 2 months Basil Irby MD [facility] Allergy follow-up and Allergy Injection.  Patient will present at 11:10 for injection.          Recent Outpatient Visits              2 weeks ago Environmental and seasonal allergies    [facility]    Nurse Only    2 weeks ago Type 2 diabetes mellitus with polyneuropathy ([facility]    Office Visit    1 month ago Environmental and seasonal allergies    [facility]    Nurse Only    1 month ago Controlled type 2 diabetes mellitus without complication, without long-term current use of insulin ([facility])    [facility] Bekah Ng APRN    Office Visit    2 months ago Environmental and seasonal allergies    [facility]    Nurse Only

## 2025-01-30 RX ORDER — MONTELUKAST SODIUM 10 MG/1
10 TABLET ORAL NIGHTLY
Qty: 90 TABLET | Refills: 3 | Status: SHIPPED | OUTPATIENT
Start: 2025-01-30

## 2025-02-10 ENCOUNTER — NURSE ONLY (OUTPATIENT)
Dept: ALLERGY | Facility: CLINIC | Age: 70
End: 2025-02-10

## 2025-02-10 DIAGNOSIS — J30.89 ENVIRONMENTAL AND SEASONAL ALLERGIES: Primary | ICD-10-CM

## 2025-02-10 RX ORDER — TIRZEPATIDE 5 MG/.5ML
5 INJECTION, SOLUTION SUBCUTANEOUS WEEKLY
Qty: 6 ML | Refills: 0 | Status: SHIPPED | OUTPATIENT
Start: 2025-02-10

## 2025-02-10 NOTE — TELEPHONE ENCOUNTER
Endocrine Refill protocol for oral and injectable diabetic medications    Protocol Criteria:  PASSED      If all below requirements are met, send a 90-day supply with 1 refill per provider protocol.    Verify appointment with Endocrinology completed in the last 6 months or scheduled in the next 3 months.  Verify A1C has been completed within the last 6 months and is below 8.5%     Last completed office visit: 12/4/2024 Bekah Ng APRN   Next scheduled Follow up:   Future Appointments   Date Time Provider Department Center   4/3/2025  9:15 AM Bekah Ng APRN [facility]      Last A1c result: Last A1c value was 6.7% done 11/19/2024.

## 2025-02-17 RX ORDER — LEVOCETIRIZINE DIHYDROCHLORIDE 5 MG/1
5 TABLET, FILM COATED ORAL NIGHTLY
Qty: 90 TABLET | Refills: 0 | Status: SHIPPED | OUTPATIENT
Start: 2025-02-17

## 2025-02-17 NOTE — TELEPHONE ENCOUNTER
Refill requested for    Name from pharmacy: Levocetirizine Dihydrochloride 5 Mg Tab Teva         Will file in chart as: LEVOCETIRIZINE 5 MG Oral Tab    Sig: TAKE ONE TABLET BY MOUTH NIGHTLY    Disp: 90 tablet    Refills: 0    Start: 2/16/2025    Class: Normal    Non-formulary    Last ordered: 5 months ago (8/24/2024) by Basil Irby MD    Last refill: 11/21/2024    Rx #: [id number]    Antihistamines Passed 02/16/2025 04:09 AM   Protocol Details Appt in past 12 mos or next 1 mos    Medication is active on med list      To be filled at: [id number] - [address] [phone], [phone]             LOV: 02/20/2024    F/U currently scheduled? Yes 4/7/2025      ACTION: Refilled per protocol.

## 2025-03-10 ENCOUNTER — NURSE ONLY (OUTPATIENT)
Dept: ALLERGY | Facility: CLINIC | Age: 70
End: 2025-03-10

## 2025-03-10 DIAGNOSIS — J30.89 ENVIRONMENTAL AND SEASONAL ALLERGIES: Primary | ICD-10-CM

## 2025-04-03 ENCOUNTER — OFFICE VISIT (OUTPATIENT)
Dept: ENDOCRINOLOGY CLINIC | Facility: CLINIC | Age: 70
End: 2025-04-03
Payer: MEDICARE

## 2025-04-03 VITALS
SYSTOLIC BLOOD PRESSURE: 138 MMHG | BODY MASS INDEX: 27.57 KG/M2 | WEIGHT: 208 LBS | HEART RATE: 56 BPM | DIASTOLIC BLOOD PRESSURE: 82 MMHG | HEIGHT: 73 IN

## 2025-04-03 DIAGNOSIS — E11.9 CONTROLLED TYPE 2 DIABETES MELLITUS WITHOUT COMPLICATION, WITHOUT LONG-TERM CURRENT USE OF INSULIN (HCC): Primary | ICD-10-CM

## 2025-04-03 LAB
GLUCOSE BLOOD: 133
HEMOGLOBIN A1C: 6.1 % (ref 4.3–5.6)
TEST STRIP LOT #: NORMAL NUMERIC

## 2025-04-03 PROCEDURE — 99214 OFFICE O/P EST MOD 30 MIN: CPT

## 2025-04-03 PROCEDURE — 83036 HEMOGLOBIN GLYCOSYLATED A1C: CPT

## 2025-04-03 PROCEDURE — 82947 ASSAY GLUCOSE BLOOD QUANT: CPT

## 2025-04-03 NOTE — PROGRESS NOTES
Name: Jose Simon Jr.  Date: 12/4/24    Referring Physician: No ref. provider found    HISTORY OF PRESENT ILLNESS   Jose Simon Jr. is a 69 year old male who presents for follow up for diabetes mellitus     Diabetes History:  Diagnosed- >20 years ago   Patient has not had hospitalizations for blood sugar issues    Prior glycohemoglobin were 7.2% 5/2024; 7.6% 7/2024; 6.7% 11/2024; 6.1% POC today      Dietary compliance: Good watches diet closely and follows low CHO diet- counts carbs daily and generally eating between  grams of carbs daily rarely eating fast food and if he does will get salad. Avoids pasta and rice.      Recall:  Breakfast- power bar (9gm), or yogurt with cheese (babybel), or occ eggs with 2 pieces of wheat toast   Lunch- beef jerkey sticks with cheese, or sandwich on small zuri (low CHO), with low sodium turkey with cheese   Dinner- salad 4-5 times weekly, low CHO, sugar free dressing. Low CHO vegetables with salad and cheese, or fish 1-2 times weekly, occ. Pork or beef, chicken with sauce.   Snack- crispy pastry ( 4-5 pieces, 20 gram of carb), or protein drink (low carb), fruit,   Beverages- water with flavoring, 1-2 diet soda, coffee- decaf     Exercise: Yes- watching [employer], gol[employer] in summer months, volunteers at [employer] a lot of walking, treadmill in winter months 3-5 days weekly.      Polyuria/polydipsia: No   Blurred vision: No     Episodes of hypoglycemia: None    Blood Glucose:  Checking 2-3 times per day  Logs scanned to chart: below are readings for last 1-2 months        Current DM Regimen:  Mounjaro 5mg subcutaneous weekly - tolerating medication  Metformin 2000 mg with dinner meal     Modifying factors:  Medication adherence: Yes   Recent steroids, illness or infections: Had influenza A      REVIEW OF SYSTEMS  Eyes: Diabetic retinopathy present: No            Most recent visit to eye doctor in last 12 months: Yes- Dr. Carpenter last visit 11/2024. Will follow up 6/2025      CV: Cardiovascular disease present: Yes- s/p stent 5/2024. Seeing cardiology every 6-12 months          Hypertension present: Yes, well controlled          Hyperlipidemia present: No         Peripheral Vascular Disease present: No    : Nephropathy present: Yes    Neuro: Neuropathy present: Yes- numbness in toes - sees podiatry- last appt 1/2025     Skin: Infection or ulceration: No    Osteoporosis: No    Thyroid disease: No      Medications:     Current Outpatient Medications:     MOUNJARO 5 MG/0.5ML Subcutaneous Solution Auto-injector, Inject 5 mg into the skin once a week., Disp: 6 mL, Rfl: 0    metFORMIN  MG Oral Tablet 24 Hr, Take 4 tablets (2,000 mg total) by mouth every evening., Disp: 360 tablet, Rfl: 3    LEVOCETIRIZINE 5 MG Oral Tab, TAKE ONE TABLET BY MOUTH NIGHTLY, Disp: 90 tablet, Rfl: 0    montelukast 10 MG Oral Tab, Take 1 tablet (10 mg total) by mouth nightly., Disp: 90 tablet, Rfl: 3    losartan-hydroCHLOROthiazide 100-25 MG Oral Tab, Take 1 tablet by mouth daily., Disp: 90 tablet, Rfl: 0    sertraline 50 MG Oral Tab, Take 1 tablet (50 mg total) by mouth daily., Disp: 90 tablet, Rfl: 3    prasugrel 10 MG Oral Tab, Take 1 tablet (10 mg total) by mouth daily., Disp: , Rfl:     Bacillus Coagulans-Inulin (PROBIOTIC) 1-250 BILLION-MG Oral Cap, , Disp: , Rfl:     Cholecalciferol 50 MCG (2000 UT) Oral Tab, Vitamin D3 50 mcg (2,000 unit) tablet, [RxNorm: 582364], Disp: , Rfl:     Vitamin C 500 MG Oral Tab, Vitamin C 500 mg tablet, [RxNorm: 715165], Disp: , Rfl:     Cinnamon 500 MG Oral Cap, Cinnamon 500 mg capsule, [RxNorm: 155664], Disp: , Rfl:     Glucose Blood (CONTOUR NEXT TEST) In Vitro Strip, Use to check blood sugar twice daily, Disp: 200 each, Rfl: 3    Microlet Lancets Does not apply Misc, Use to check blood sugar twice daily, Disp: 200 each, Rfl: 3    Lancets Does not apply Misc, Test two times daily, Disp: 200 each, Rfl: 2    Glucose Blood (BLOOD GLUCOSE TEST STRIPS 333) In Vitro  Strip, 1 each by In Vitro route 2 (two) times daily., Disp: 200 strip, Rfl: 3    ondansetron (ZOFRAN) 4 mg tablet, Take 1 tablet (4 mg total) by mouth every 8 (eight) hours as needed for Nausea., Disp: 10 tablet, Rfl: 2    ALPRAZolam (XANAX) 0.5 MG Oral Tab, Take one an hour prior to flying and repeat if necessary for anxiety ., Disp: 10 tablet, Rfl: 0    atorvastatin 40 MG Oral Tab, Take 1 tablet (40 mg total) by mouth nightly., Disp: 90 tablet, Rfl: 3    carvedilol 6.25 MG Oral Tab, Take 1 tablet (6.25 mg total) by mouth 2 (two) times daily with meals., Disp: , Rfl:     tamsulosin 0.4 MG Oral Cap, Take 1 capsule (0.4 mg total) by mouth nightly. TAKE AT BEDTIME, Disp: 90 capsule, Rfl: 3    traMADol 50 MG Oral Tab, TAKE ONE TABLET BY MOUTH EVERY SIX HOURS AS NEEDED FOR PAIN, Disp: 50 tablet, Rfl: 5    albuterol (PROAIR HFA) 108 (90 Base) MCG/ACT Inhalation Aero Soln, Inhale 2 puffs into the lungs every 4 to 6 hours as needed for Wheezing., Disp: 3 each, Rfl: 3    zolpidem 5 MG Oral Tab, Take 1 tablet (5 mg total) by mouth nightly as needed for Sleep., Disp: 20 tablet, Rfl: 0    SUMAtriptan Succinate 100 MG Oral Tab, Take 1 tab as needed for headache, may repeat in 2 hours, maximum 2 tabs in 24 hours., Disp: 9 tablet, Rfl: 2    pseudoephedrine  MG Oral Tablet 12 Hr, Take 1 tablet (120 mg total) by mouth every 12 (twelve) hours., Disp: 60 tablet, Rfl: 3    LORATADINE 10 MG Oral Tab, TAKE ONE TABLET BY MOUTH ONE TIME DAILY  (Patient taking differently: No sig reported), Disp: 90 tablet, Rfl: 2    aspirin 81 MG Oral Tab, Take 1 tablet (81 mg total) by mouth daily., Disp: , Rfl:     Multiple Vitamins-Minerals (MULTIVITAMIN ADULT OR), Take 1 tablet by mouth daily., Disp: , Rfl:     Glucose Blood (SEPIDEH CONTOUR TEST) In Vitro Strip, Test twice a day, Disp: 100 each, Rfl: 11    SEPIDEH MICROLET LANCETS Does not apply Misc, Test twice a day, Disp: 100 each, Rfl: 11    ALPRAZolam 0.5 MG Oral Tab, Take 1 tablet (0.5 mg  total) by mouth daily., Disp: , Rfl:     Omega-3 Fatty Acids (OMEGA-3 FISH OIL) 1200 MG Oral Cap, Take  by mouth., Disp: , Rfl:      Allergies:   Allergies   Allergen Reactions    Cats Claw, Uncaria Tomentosa ITCHING     Dander     Mold Runny nose    Ragweed ITCHING       Social History:   Social History     Socioeconomic History    Marital status:    Tobacco Use    Smoking status: Never    Smokeless tobacco: Never   Vaping Use    Vaping status: Never Used   Substance and Sexual Activity    Alcohol use: Not Currently     Alcohol/week: 0.0 standard drinks of alcohol     Comment: socially    Drug use: No       Medical History:   Past Medical History:    Allergic rhinitis    Asthma (HCC)    Back problem    Degenerative cervical disc    Diabetes (HCC)    Essential hypertension    Lipid screening    Migraine headache    Migraines    Peptic ulcer disease    Seasonal allergies    Visual impairment    glasses       Surgical history:   Past Surgical History:   Procedure Laterality Date    Colonoscopy  2012    repeat in 2017    Colonoscopy  2017    repeat 2024    Colonoscopy N/A 4/18/2024    Procedure: COLONOSCOPY;  Surgeon: Florin Corey MD;  Location: [facility] MAIN OR    Vasectomy           PHYSICAL EXAM  Vitals:    04/03/25 0920   BP: 138/82   Pulse: 56         General Appearance:  alert, well developed, in no acute distress  Eyes:  normal conjunctivae, sclera, and normal pupils  Neck: Trachea midline: Normal  Back: no kyphosis or back tenderness  Lymph Nodes:  No abnormal nodes noted  Musculoskeletal:  normal muscle strength and tone  Skin:  normal moisture and skin texture  Hair & Nails:  normal scalp hair     Hematologic:  no excessive bruising  Neuro:  sensory grossly intact and motor grossly intact.  Psychiatric:  oriented to time, self, and place  Nutritional:  no abnormal weight gain or loss      ASSESSMENT/PLAN:    Diabetes mellitus type 2 controlled  -HgA1c - 6.1%  POC today   -Congratulated patient  on improved glycemic control   -Reviewed ABC's of diabetes   - Reviewed pathogenesis of diabetes.   - Reviewed importance of good glycemic control to prevent microvascular and macrovascular complications including nephropathy, neuropathy, retinopathy, and cardiovascular disease.  - Reviewed importance of SBGM- check glucose 1-2 times daily   - Reviewed target glucose goals for patient  fasting and <180 post prandially   - Reviewed importance of following diabetic diet- recommended 135 grams of CHO per day or 45 grams per meal.   - Provided patient education materials    - Reviewed diet at length and agree overall he is doing very well with low CHO diet, incorporates whole grains, higher fiber carbs and avoids starches. Avoids sweetened beverages. Is also choosing lower fat/sodium options recently after having stent placed a month ago. Encouraged him to continue with diet and reviewed carb counting.     - Decrease Metformin 2000 mg PO daily. --> 1000 mg PO Daily     - Continue Mounjaro to 5 mg subcutaneous weekly. Reviewed side effects and risks vs benefits of medication and reviewed pen injection. Reviewed cardiac benefit.     - Continue to check sugars daily.    - normotensive   - Lipids 9/2024- at goal, on statin  - No nephropathy on last lab 9/2024- on losartan   -Repeat labs prior to next visit   - UTD with optho - sees yearly - saw 11/2024 and has follow up 6/2025  - normal foot exam 1/2025 with podiatry     RTC in 5 months   4/3/25   BERNARD Cage    A total of  30 minutes was spent on obtaining history, reviewing pertinent imaging/labs and specialists notes, evaluating patient, providing multiple treatment options, reinforcing diet/exercise and compliance, and completing documentation.

## 2025-04-07 ENCOUNTER — NURSE ONLY (OUTPATIENT)
Dept: ALLERGY | Facility: CLINIC | Age: 70
End: 2025-04-07

## 2025-04-07 ENCOUNTER — OFFICE VISIT (OUTPATIENT)
Dept: ALLERGY | Facility: CLINIC | Age: 70
End: 2025-04-07
Payer: MEDICARE

## 2025-04-07 DIAGNOSIS — Z23 NEED FOR COVID-19 VACCINE: ICD-10-CM

## 2025-04-07 DIAGNOSIS — J30.89 PERENNIAL ALLERGIC RHINITIS WITH SEASONAL VARIATION: ICD-10-CM

## 2025-04-07 DIAGNOSIS — Z23 FLU VACCINE NEED: ICD-10-CM

## 2025-04-07 DIAGNOSIS — J45.20 MILD INTERMITTENT EXTRINSIC ASTHMA WITHOUT COMPLICATION (HCC): Primary | ICD-10-CM

## 2025-04-07 DIAGNOSIS — J30.89 ENVIRONMENTAL AND SEASONAL ALLERGIES: Primary | ICD-10-CM

## 2025-04-07 DIAGNOSIS — J30.2 PERENNIAL ALLERGIC RHINITIS WITH SEASONAL VARIATION: ICD-10-CM

## 2025-04-07 DIAGNOSIS — J32.8 OTHER CHRONIC SINUSITIS: ICD-10-CM

## 2025-04-07 PROCEDURE — 95117 IMMUNOTHERAPY INJECTIONS: CPT | Performed by: ALLERGY & IMMUNOLOGY

## 2025-04-07 RX ORDER — CLOPIDOGREL BISULFATE 75 MG
TABLET ORAL
COMMUNITY
Start: 2025-03-10

## 2025-04-07 NOTE — PROGRESS NOTES
The following individual(s) verbally consented to be recorded using ambient AI listening technology and understand that they can each withdraw their consent to this listening technology at any point by asking the clinician to turn off or pause the recording:    Patient name: Jose Simon Jr.

## 2025-04-07 NOTE — PATIENT INSTRUCTIONS
Allergic rhinitis    He is three years and two months into immunotherapy, which began in July 2021 with maintenance dosing starting January 2022. The recommended duration for maintenance dosing is three to five years. He is doing well with no active allergy issues. Some patients may stop after three years if he feels well, but five years is recommended for optimal outcomes. Retesting can be considered between the three to five-year praful if needed.    - Continue immunotherapy every four weeks.    - Continue with Xyzal (levocetirizine) and Singulair (montelukast).    - Consider retesting at the five-year praful.    - Reviewed avoidance measures.      Chronic sinusitis    He averages two sinus infections per year, requiring antibiotics. He is followed by ENT with no polyps and intact sense of smell. ENT evaluation shows no issues. Frequency of infections is within normal limits, but if infections exceed four times per year, further evaluation with ENT, possibly including a CT scan, may be necessary.    - Continue treatment of underlying allergies.      Flu vaccination    Flu vaccine is up to date as of fall 2023.      COVID-19 vaccination    COVID-19 booster recommended for fall 2024. The most recent booster was in September 2023.      Pneumonia vaccination    Pneumonia vaccine is up to date since turning 64 in 2022.    Asthma  Mild intermittent  Albuterol 2 puffs every 4-6 hours as needed.  Of note patient is also on Singulair for treatment of underlying allergic rhinitis which may help with asthma symptoms.  No ED visits or prednisone in the interim.  Continue with albuterol as needed

## 2025-04-07 NOTE — PROGRESS NOTES
Jose Simon Jr. is a 69 year old male.    HPI:     Chief Complaint   Patient presents with    Allergies     Annual and AIT. No major concerns or symptoms.     Patient is a 69-year-old male who presents for follow-up with a chief complaint of allergies and sinus issues  Patient last seen by me in February 20, 2024  History of allergic rhinitis asthma and chronic sinusitis.  Patient currently on immunotherapy to ragweed mold cats and dogs  Maintenance dosing since January 2022  Medication list include Xyzal  singulair     Immunizations reviewed  COVID-vaccine x 2 doses last in March 2021.  Prior pneumonia vaccine in 2022  Last flu vaccine from 2023 and 2024        today patient reports      History of Present Illness  Jose Simon Jr. is a 69 year old male with allergic rhinitis who presents for follow-up on immunotherapy treatment.    He is currently undergoing immunotherapy for allergic rhinitis, which began in July 2021. He has been on maintenance dosing since January 2022 and is now three years into the treatment. He receives shots once a month and has not experienced any problems with the shots either in the office or after leaving. He is taking Xyzal (levocetirizine) 5 mg and Singulair (montelukast) for his allergies. He does not use any nasal sprays except for a saline rinse in the morning. No current allergy symptoms are reported.    He experiences recurrent sinus infections, typically between fall and early winter, averaging two per year. These infections sometimes require antibiotics, and he has been followed by an ENT who has found no issues with his sinuses. He has not had more than four infections per year. No issues with his sense of smell and no history of nasal polyps.    He has not experienced any asthma symptoms or required the use of his inhaler recently.         HISTORY:  Past Medical History:    Allergic rhinitis    Asthma (HCC)    Back problem    Degenerative cervical disc    Diabetes  (HCC)    Essential hypertension    Lipid screening    Migraine headache    Migraines    Peptic ulcer disease    Seasonal allergies    Visual impairment    glasses      Past Surgical History:   Procedure Laterality Date    Colonoscopy  01/01/2012    repeat in 2017    Colonoscopy  01/01/2017    repeat 2024    Colonoscopy N/A 04/18/2024    Procedure: COLONOSCOPY;  Surgeon: Florin Corey MD;  Location: [facility] OR    Coronary stent placement  2024    Vasectomy        Family History   Problem Relation Age of Onset    Hypertension Father     Diabetes Father     Cancer Father         colon    Heart Disorder Father     Other (Other) Mother         copd    Cancer Daughter         thyroid    Hypertension Son     Cancer Paternal Grandmother         colon      Social History:   Social History     Socioeconomic History    Marital status:    Tobacco Use    Smoking status: Never    Smokeless tobacco: Never   Vaping Use    Vaping status: Never Used   Substance and Sexual Activity    Alcohol use: Not Currently     Alcohol/week: 0.0 standard drinks of alcohol     Comment: socially    Drug use: No        Medications (Active prior to today's visit):  Current Outpatient Medications   Medication Sig Dispense Refill    PLAVIX 75 MG Oral Tab Plavix 75 mg tablet, [RxNorm: 536175]      Cyanocobalamin (VITAMIN B 12 OR) Take by mouth.      LEVOCETIRIZINE 5 MG Oral Tab TAKE ONE TABLET BY MOUTH NIGHTLY 90 tablet 0    MOUNJARO 5 MG/0.5ML Subcutaneous Solution Auto-injector Inject 5 mg into the skin once a week. 6 mL 0    montelukast 10 MG Oral Tab Take 1 tablet (10 mg total) by mouth nightly. 90 tablet 3    losartan-hydroCHLOROthiazide 100-25 MG Oral Tab Take 1 tablet by mouth daily. 90 tablet 0    sertraline 50 MG Oral Tab Take 1 tablet (50 mg total) by mouth daily. 90 tablet 3    prasugrel 10 MG Oral Tab Take 1 tablet (10 mg total) by mouth daily.      Bacillus Coagulans-Inulin (PROBIOTIC) 1-250 BILLION-MG Oral Cap        Cholecalciferol 50 MCG (2000 UT) Oral Tab Vitamin D3 50 mcg (2,000 unit) tablet, [RxNorm: 108828]      Vitamin C 500 MG Oral Tab Vitamin C 500 mg tablet, [RxNorm: 677611]      metFORMIN  MG Oral Tablet 24 Hr Take 4 tablets (2,000 mg total) by mouth every evening. (Patient taking differently: Take 2 tablets (1,000 mg total) by mouth every evening. Take 2 tablets) 360 tablet 3    ondansetron (ZOFRAN) 4 mg tablet Take 1 tablet (4 mg total) by mouth every 8 (eight) hours as needed for Nausea. 10 tablet 2    ALPRAZolam (XANAX) 0.5 MG Oral Tab Take one an hour prior to flying and repeat if necessary for anxiety . 10 tablet 0    atorvastatin 40 MG Oral Tab Take 1 tablet (40 mg total) by mouth nightly. 90 tablet 3    carvedilol 6.25 MG Oral Tab Take 1 tablet (6.25 mg total) by mouth 2 (two) times daily with meals.      tamsulosin 0.4 MG Oral Cap Take 1 capsule (0.4 mg total) by mouth nightly. TAKE AT BEDTIME 90 capsule 3    traMADol 50 MG Oral Tab TAKE ONE TABLET BY MOUTH EVERY SIX HOURS AS NEEDED FOR PAIN 50 tablet 5    albuterol (PROAIR HFA) 108 (90 Base) MCG/ACT Inhalation Aero Soln Inhale 2 puffs into the lungs every 4 to 6 hours as needed for Wheezing. 3 each 3    zolpidem 5 MG Oral Tab Take 1 tablet (5 mg total) by mouth nightly as needed for Sleep. 20 tablet 0    SUMAtriptan Succinate 100 MG Oral Tab Take 1 tab as needed for headache, may repeat in 2 hours, maximum 2 tabs in 24 hours. 9 tablet 2    aspirin 81 MG Oral Tab Take 1 tablet (81 mg total) by mouth daily.      Multiple Vitamins-Minerals (MULTIVITAMIN ADULT OR) Take 1 tablet by mouth daily.      Glucose Blood (SEPIDEH CONTOUR TEST) In Vitro Strip Test twice a day 100 each 11    SEPIDEH MICROLET LANCETS Does not apply Misc Test twice a day 100 each 11    ALPRAZolam 0.5 MG Oral Tab Take 1 tablet (0.5 mg total) by mouth daily.      Omega-3 Fatty Acids (OMEGA-3 FISH OIL) 1200 MG Oral Cap Take  by mouth.      pseudoephedrine  MG Oral Tablet 12 Hr Take 1  tablet (120 mg total) by mouth every 12 (twelve) hours. (Patient not taking: Reported on 4/7/2025) 60 tablet 3    LORATADINE 10 MG Oral Tab TAKE ONE TABLET BY MOUTH ONE TIME DAILY  (Patient not taking: Reported on 4/7/2025) 90 tablet 2       Allergies:  Allergies[1]      ROS:   Allergic/Immuno:  See hpi  Cardiovascular:  Negative for irregular heartbeat/palpitations, chest pain, edema  Constitutional:  Negative night sweats,weight loss, irritability and lethargy  ENMT:  Negative for ear drainage, hearing loss and nasal drainage  Eyes:  Negative for eye discharge and vision loss  Gastrointestinal:  Negative for abdominal pain, diarrhea and vomiting  Integumentary:  Negative for pruritus and rash  Respiratory:  Negative for cough, dyspnea and wheezing    PHYSICAL EXAM:   Constitutional: responsive, no acute distress noted  Head/Face: NC/Atraumatic  Eyes/Vision: conjunctiva and lids are normal extraocular motion is intact   Ears/Audiometry: tympanic membranes are normal bilaterally hearing is grossly intact  Nose/Mouth/Throat: nose and throat are clear mucous membranes are moist   Neck/Thyroid: neck is supple without adenopathy  Lymphatic: no abnormal cervical, supraclavicular or axillary adenopathy is noted  Respiratory: normal to inspection lungs are clear to auscultation bilaterally normal respiratory effort   Cardiovascular: regular rate and rhythm no murmurs, gallups, or rubs  Abdomen: soft non-tender non-distended  Skin/Hair: no unusual rashes present   Extremities: no edema, cyanosis, or clubbing     ASSESSMENT/PLAN:   Assessment   Encounter Diagnoses   Name Primary?    Mild intermittent extrinsic asthma without complication (HCC) Yes    Other chronic sinusitis     Perennial allergic rhinitis with seasonal variation     Flu vaccine need     Need for COVID-19 vaccine      Immunotherapy in office today followed by 30 minutes of observation without issue or incident  Assessment & Plan  Allergic rhinitis    He is  three years and two months into immunotherapy, which began in July 2021 with maintenance dosing starting January 2022. The recommended duration for maintenance dosing is three to five years. He is doing well with no active allergy issues. Some patients may stop after three years if he feels well, but five years is recommended for optimal outcomes. Retesting can be considered between the three to five-year praful if needed.    - Continue immunotherapy every four weeks.    - Continue with Xyzal (levocetirizine) and Singulair (montelukast).    - Consider retesting at the five-year praful.    - Reviewed avoidance measures.      Chronic sinusitis    He averages two sinus infections per year, requiring antibiotics. He is followed by ENT with no polyps and intact sense of smell. ENT evaluation shows no issues. Frequency of infections is within normal limits, but if infections exceed four times per year, further evaluation with ENT, possibly including a CT scan, may be necessary.    - Continue treatment of underlying allergies.      Flu vaccination    Flu vaccine is up to date as of fall 2023.      COVID-19 vaccination    COVID-19 booster recommended for fall 2024. The most recent booster was in September 2023.      Pneumonia vaccination    Pneumonia vaccine is up to date since turning 64 in 2022.    Asthma  Mild intermittent  Albuterol 2 puffs every 4-6 hours as needed.  Of note patient is also on Singulair for treatment of underlying allergic rhinitis which may help with asthma symptoms.  No ED visits or prednisone in the interim.  Continue with albuterol as needed                Orders This Visit:  No orders of the defined types were placed in this encounter.      Meds This Visit:  Requested Prescriptions      No prescriptions requested or ordered in this encounter       Imaging & Referrals:  None     4/7/2025  Basil Irby MD    If medication samples were provided today, they were provided solely for patient education  and training related to self administration of these medications.  Teaching, instruction and sample was provided to the patient by myself.  Teaching included  a review of potential adverse side effects as well as potential efficacy.  Patient's questions were answered in regards to medication administration and dosing and potential side effects. Teaching was provided via the teach back method           [1]   Allergies  Allergen Reactions    Cats Claw, Uncaria Tomentosa ITCHING     Dander     Mold Runny nose    Ragweed ITCHING

## 2025-04-24 NOTE — TELEPHONE ENCOUNTER
Betty with [facility] in [place] calling to request refill of Test Strips and Lancets.   Needs order to indicate the Diagnosis, and if he is or is not using Insulin (because insurance is Medicare)     Per Betty, test strips order needs to say \"Contour next strip\"  And Lancets order needs to say \"Microlet lancets \"

## 2025-04-29 ENCOUNTER — TELEPHONE (OUTPATIENT)
Dept: ENDOCRINOLOGY CLINIC | Facility: CLINIC | Age: 70
End: 2025-04-29

## 2025-04-29 NOTE — TELEPHONE ENCOUNTER
Pharmacy is requesting a refill.   She states that the prescription needs to include if he is insulin dependent adnd One diagnosis code.    Current Outpatient Medications:       Glucose Blood (SEPIDEH CONTOUR TEST) In Vitro Strip, Test twice a day, Disp: 100 each, Rfl: 11    SEPIDEH MICROLET LANCETS Does not apply Misc, Test twice a day, Disp: 100 each, Rfl: 11

## 2025-04-30 RX ORDER — LANCETS
EACH MISCELLANEOUS
Qty: 200 EACH | Refills: 3 | Status: SHIPPED | OUTPATIENT
Start: 2025-04-30

## 2025-04-30 RX ORDER — LANCETS
EACH MISCELLANEOUS
Qty: 200 EACH | Refills: 3 | Status: SHIPPED | OUTPATIENT
Start: 2025-04-30 | End: 2025-04-30

## 2025-04-30 NOTE — TELEPHONE ENCOUNTER
Endocrine Refill protocol for Glucose testing supplies     Protocol Criteria: PASSED Reason: N/A    If below requirement is met, send a 90-day supply with 1 refill per provider protocol.    Verify appointment with Endocrinology completed in the last 6 months or scheduled in the next 3 months.    Last completed office visit: 4/3/2025 Bekah Ng APRN   Next scheduled Follow up:   Future Appointments   Date Time Provider Department Center                                             9/25/2025 10:00 AM Berenice, Bekah Joie, APRN [facility]

## 2025-04-30 NOTE — TELEPHONE ENCOUNTER
Dominick Alonso calling from [facility] to states she received orders but they are still not medicare compliant. States order needs to say patient is not on insulin. Orders updated and resent.

## 2025-05-01 ENCOUNTER — MED REC SCAN ONLY (OUTPATIENT)
Dept: INTERNAL MEDICINE CLINIC | Facility: CLINIC | Age: 70
End: 2025-05-01

## 2025-05-02 RX ORDER — TIRZEPATIDE 5 MG/.5ML
5 INJECTION, SOLUTION SUBCUTANEOUS WEEKLY
Qty: 6 ML | Refills: 1 | Status: SHIPPED | OUTPATIENT
Start: 2025-05-02

## 2025-05-02 NOTE — TELEPHONE ENCOUNTER
Endocrine Refill protocol for oral and injectable diabetic medications    Protocol Criteria:  PASSED      If all below requirements are met, send a 90-day supply with 1 refill per provider protocol.    Verify appointment with Endocrinology completed in the last 6 months or scheduled in the next 3 months.  Verify A1C has been completed within the last 6 months and is below 8.5%     Last completed office visit: 4/3/2025 Bekah Ng APRN   Next scheduled Follow up:   Future Appointments   Date Time Provider Department Center   9/25/2025 10:00 AM Bekah Ng APRN [facility]      Last A1c result: Last A1c value was 6.1% done 4/3/2025.

## 2025-05-05 ENCOUNTER — NURSE ONLY (OUTPATIENT)
Dept: ALLERGY | Facility: CLINIC | Age: 70
End: 2025-05-05

## 2025-05-05 ENCOUNTER — OFFICE VISIT (OUTPATIENT)
Dept: INTERNAL MEDICINE CLINIC | Facility: CLINIC | Age: 70
End: 2025-05-05
Payer: MEDICARE

## 2025-05-05 VITALS
WEIGHT: 208 LBS | HEART RATE: 56 BPM | TEMPERATURE: 98 F | SYSTOLIC BLOOD PRESSURE: 120 MMHG | DIASTOLIC BLOOD PRESSURE: 68 MMHG | OXYGEN SATURATION: 98 % | HEIGHT: 72 IN | BODY MASS INDEX: 28.17 KG/M2

## 2025-05-05 DIAGNOSIS — I10 ESSENTIAL HYPERTENSION, BENIGN: Primary | ICD-10-CM

## 2025-05-05 DIAGNOSIS — R26.89 IMBALANCE: ICD-10-CM

## 2025-05-05 DIAGNOSIS — G44.029 CHRONIC CLUSTER HEADACHE, NOT INTRACTABLE: ICD-10-CM

## 2025-05-05 DIAGNOSIS — J30.89 ENVIRONMENTAL AND SEASONAL ALLERGIES: Primary | ICD-10-CM

## 2025-05-05 PROCEDURE — 99214 OFFICE O/P EST MOD 30 MIN: CPT | Performed by: INTERNAL MEDICINE

## 2025-05-05 RX ORDER — SUMATRIPTAN SUCCINATE 100 MG/1
TABLET ORAL
Qty: 9 TABLET | Refills: 2 | Status: SHIPPED | OUTPATIENT
Start: 2025-05-05

## 2025-05-05 RX ORDER — TRAMADOL HYDROCHLORIDE 50 MG/1
TABLET ORAL
Qty: 50 TABLET | Refills: 5 | Status: SHIPPED | OUTPATIENT
Start: 2025-05-05

## 2025-05-05 RX ORDER — TAMSULOSIN HYDROCHLORIDE 0.4 MG/1
0.4 CAPSULE ORAL NIGHTLY
Qty: 90 CAPSULE | Refills: 3 | Status: SHIPPED | OUTPATIENT
Start: 2025-05-05

## 2025-05-05 NOTE — PROGRESS NOTES
HPI:    Patient ID: Jose Simon Jr. is a 69 year old male.    HPIpatient has had issues with poor balance which are worsening . Feels unsteady if climbing bleachers.  Can't go up on a ladder .   Veers to one side while walking , no nausea, no room spinning . No visual issues , sees cardiology , has had a stent placed.     Review of Systems   Constitutional: Negative.    HENT: Negative.     Eyes: Negative.    Respiratory: Negative.     Cardiovascular:  Negative for chest pain and leg swelling.   Gastrointestinal: Negative.    Endocrine: Negative.    Genitourinary:  Negative for decreased urine volume, difficulty urinating, dysuria, flank pain, frequency, hematuria and urgency.   Musculoskeletal:  Negative for arthralgias, back pain, gait problem, joint swelling and myalgias.   Skin:  Negative for color change, rash and wound.   Allergic/Immunologic: Negative.    Neurological:  Positive for light-headedness. Negative for headaches.        Poor balance.    Psychiatric/Behavioral:  Negative for agitation and confusion. The patient is not nervous/anxious.           Current Medications[1]  Allergies:Allergies[2]   PHYSICAL EXAM:   /68   Pulse 56   Temp 98.1 °F (36.7 °C)   Ht 6' (1.829 m)   Wt 208 lb (94.3 kg)   SpO2 98%   BMI 28.21 kg/m²      Physical Exam  Constitutional:       General: He is not in acute distress.     Appearance: Normal appearance. He is well-developed. He is not diaphoretic.   HENT:      Right Ear: Tympanic membrane, ear canal and external ear normal. There is no impacted cerumen.      Left Ear: Tympanic membrane, ear canal and external ear normal. There is no impacted cerumen.      Nose: Nose normal.      Mouth/Throat:      Pharynx: No oropharyngeal exudate.   Eyes:      General: No scleral icterus.        Right eye: No discharge.         Left eye: No discharge.      Conjunctiva/sclera: Conjunctivae normal.      Pupils: Pupils are equal, round, and reactive to light.   Neck:       Thyroid: No thyromegaly.      Vascular: No JVD.      Trachea: No tracheal deviation.   Cardiovascular:      Rate and Rhythm: Normal rate and regular rhythm.      Heart sounds: Normal heart sounds. No murmur heard.     No friction rub. No gallop.   Pulmonary:      Effort: Pulmonary effort is normal. No respiratory distress.      Breath sounds: Normal breath sounds. No wheezing or rales.   Chest:      Chest wall: No tenderness.   Abdominal:      General: Bowel sounds are normal. There is no distension.      Palpations: Abdomen is soft. There is no mass.      Tenderness: There is no abdominal tenderness. There is no guarding.   Musculoskeletal:         General: No tenderness.      Cervical back: Normal range of motion and neck supple.   Lymphadenopathy:      Cervical: No cervical adenopathy.   Skin:     General: Skin is warm and dry.      Coloration: Skin is not pale.      Findings: No erythema or rash.   Neurological:      Mental Status: He is alert and oriented to person, place, and time.      Cranial Nerves: No cranial nerve deficit.      Sensory: No sensory deficit.      Motor: No weakness or abnormal muscle tone.      Coordination: Coordination abnormal.      Gait: Gait normal.      Deep Tendon Reflexes: Reflexes normal.      Comments: Positive rhomberg . Cannot stand on one foot without tipping to the side.    Psychiatric:         Mood and Affect: Mood normal.         Behavior: Behavior normal.         Thought Content: Thought content normal.         Judgment: Judgment normal.                ASSESSMENT/PLAN:   Imbalance  Positive Rhomberg test.   See Neurology   See CT brain     Essential hypertension, benign  Bp is controlled.     Chronic cluster headache, not intractable   Tramadol prn   Ct brain .     No orders of the defined types were placed in this encounter.      Meds This Visit:  Requested Prescriptions     Signed Prescriptions Disp Refills    SUMAtriptan Succinate 100 MG Oral Tab 9 tablet 2     Sig: Take  1 tab as needed for headache, may repeat in 2 hours, maximum 2 tabs in 24 hours.    tamsulosin 0.4 MG Oral Cap 90 capsule 3     Sig: Take 1 capsule (0.4 mg total) by mouth nightly. TAKE AT BEDTIME    traMADol 50 MG Oral Tab 50 tablet 5     Sig: TAKE ONE TABLET BY MOUTH EVERY SIX HOURS AS NEEDED FOR PAIN    Naloxone HCl 4 MG/0.1ML Nasal Liquid 1 kit 0     Si mg by Nasal route as needed. If patient remains unresponsive, repeat dose in other nostril 2-5 minutes after first dose.       Imaging & Referrals:  NEURO - INTERNAL  CT BRAIN OR HEAD (CPT=70450)       ID#[id number]       [1]   Current Outpatient Medications   Medication Sig Dispense Refill    SUMAtriptan Succinate 100 MG Oral Tab Take 1 tab as needed for headache, may repeat in 2 hours, maximum 2 tabs in 24 hours. 9 tablet 2    tamsulosin 0.4 MG Oral Cap Take 1 capsule (0.4 mg total) by mouth nightly. TAKE AT BEDTIME 90 capsule 3    traMADol 50 MG Oral Tab TAKE ONE TABLET BY MOUTH EVERY SIX HOURS AS NEEDED FOR PAIN 50 tablet 5    Naloxone HCl 4 MG/0.1ML Nasal Liquid 4 mg by Nasal route as needed. If patient remains unresponsive, repeat dose in other nostril 2-5 minutes after first dose. 1 kit 0    MOUNJARO 5 MG/0.5ML Subcutaneous Solution Auto-injector Inject 5 mg into the skin once a week. 6 mL 1    PLAVIX 75 MG Oral Tab Plavix 75 mg tablet, [RxNorm: 473881]      Cyanocobalamin (VITAMIN B 12 OR) Take by mouth.      LEVOCETIRIZINE 5 MG Oral Tab TAKE ONE TABLET BY MOUTH NIGHTLY 90 tablet 0    montelukast 10 MG Oral Tab Take 1 tablet (10 mg total) by mouth nightly. 90 tablet 3    losartan-hydroCHLOROthiazide 100-25 MG Oral Tab Take 1 tablet by mouth daily. 90 tablet 0    sertraline 50 MG Oral Tab Take 1 tablet (50 mg total) by mouth daily. 90 tablet 3    prasugrel 10 MG Oral Tab Take 1 tablet (10 mg total) by mouth daily.      Bacillus Coagulans-Inulin (PROBIOTIC) 1-250 BILLION-MG Oral Cap       Cholecalciferol 50 MCG (2000 UT) Oral Tab Vitamin D3 50 mcg (2,000  unit) tablet, [RxNorm: 509224]      Vitamin C 500 MG Oral Tab Vitamin C 500 mg tablet, [RxNorm: 561801]      metFORMIN  MG Oral Tablet 24 Hr Take 4 tablets (2,000 mg total) by mouth every evening. (Patient taking differently: Take 2 tablets (1,000 mg total) by mouth every evening. Take 2 tablets) 360 tablet 3    atorvastatin 40 MG Oral Tab Take 1 tablet (40 mg total) by mouth nightly. 90 tablet 3    carvedilol 6.25 MG Oral Tab Take 1 tablet (6.25 mg total) by mouth 2 (two) times daily with meals.      aspirin 81 MG Oral Tab Take 1 tablet (81 mg total) by mouth daily.      Multiple Vitamins-Minerals (MULTIVITAMIN ADULT OR) Take 1 tablet by mouth daily.      Omega-3 Fatty Acids (OMEGA-3 FISH OIL) 1200 MG Oral Cap Take  by mouth.      Microlet Lancets Does not apply Misc Test glucose Twice a day 200 each 3    Glucose Blood In Vitro Strip Contour Next Test Strips. Test glucose twice a day 100 each 11    ondansetron (ZOFRAN) 4 mg tablet Take 1 tablet (4 mg total) by mouth every 8 (eight) hours as needed for Nausea. 10 tablet 2    ALPRAZolam (XANAX) 0.5 MG Oral Tab Take one an hour prior to flying and repeat if necessary for anxiety . 10 tablet 0    albuterol (PROAIR HFA) 108 (90 Base) MCG/ACT Inhalation Aero Soln Inhale 2 puffs into the lungs every 4 to 6 hours as needed for Wheezing. 3 each 3    zolpidem 5 MG Oral Tab Take 1 tablet (5 mg total) by mouth nightly as needed for Sleep. 20 tablet 0    pseudoephedrine  MG Oral Tablet 12 Hr Take 1 tablet (120 mg total) by mouth every 12 (twelve) hours. (Patient not taking: Reported on 4/7/2025) 60 tablet 3    LORATADINE 10 MG Oral Tab TAKE ONE TABLET BY MOUTH ONE TIME DAILY  (Patient not taking: Reported on 4/7/2025) 90 tablet 2    SEPIDEH MICROLET LANCETS Does not apply Misc Test twice a day 100 each 11    ALPRAZolam 0.5 MG Oral Tab Take 1 tablet (0.5 mg total) by mouth daily.     [2]   Allergies  Allergen Reactions    Cats Claw, Uncaria Tomentosa ITCHING     Dander      Mold Runny nose    Ragweed ITCHING

## 2025-05-13 ENCOUNTER — HOSPITAL ENCOUNTER (OUTPATIENT)
Dept: CT IMAGING | Facility: HOSPITAL | Age: 70
Discharge: HOME | End: 2025-05-13
Attending: INTERNAL MEDICINE
Payer: MEDICARE

## 2025-05-13 DIAGNOSIS — R26.89 IMBALANCE: ICD-10-CM

## 2025-05-13 PROCEDURE — 70450 CT HEAD/BRAIN W/O DYE: CPT | Performed by: INTERNAL MEDICINE

## 2025-05-15 RX ORDER — LEVOCETIRIZINE DIHYDROCHLORIDE 5 MG/1
5 TABLET, FILM COATED ORAL NIGHTLY
Qty: 90 TABLET | Refills: 1 | Status: SHIPPED | OUTPATIENT
Start: 2025-05-15

## 2025-05-15 NOTE — TELEPHONE ENCOUNTER
Refill requested for    Name from pharmacy: Levocetirizine Dihydrochloride 5 Mg Tab Teva         Will file in chart as: LEVOCETIRIZINE 5 MG Oral Tab    Sig: TAKE ONE TABLET BY MOUTH NIGHTLY    Disp: 90 tablet    Refills: 0    Start: 5/15/2025    Class: Normal    Non-formulary    Last ordered: 2 months ago (2/17/2025) by Basil Irby MD    Last refill: 2/17/2025    Rx #: [id number]    Antihistamines Passed 05/15/2025 04:09 AM   Protocol Details Appt in past 12 mos or next 1 mos    Medication is active on med list      To be filled at: [id number] - [address] [phone], [phone]          Last office visit: 04/07/2025    Previously advised to follow up in one year     F/U currently scheduled? Not at this time         ACTION: Refilled per protocol.

## 2025-05-30 RX ORDER — LOSARTAN POTASSIUM AND HYDROCHLOROTHIAZIDE 25; 100 MG/1; MG/1
1 TABLET ORAL DAILY
Qty: 90 TABLET | Refills: 3 | Status: SHIPPED | OUTPATIENT
Start: 2025-05-30

## 2025-05-30 RX ORDER — ATORVASTATIN CALCIUM 40 MG/1
40 TABLET, FILM COATED ORAL NIGHTLY
Qty: 90 TABLET | Refills: 3 | Status: SHIPPED | OUTPATIENT
Start: 2025-05-30

## 2025-06-02 ENCOUNTER — NURSE ONLY (OUTPATIENT)
Dept: ALLERGY | Facility: CLINIC | Age: 70
End: 2025-06-02

## 2025-06-02 DIAGNOSIS — J30.89 ENVIRONMENTAL AND SEASONAL ALLERGIES: Primary | ICD-10-CM

## 2025-06-30 ENCOUNTER — NURSE ONLY (OUTPATIENT)
Dept: ALLERGY | Facility: CLINIC | Age: 70
End: 2025-06-30

## 2025-06-30 DIAGNOSIS — J30.89 ENVIRONMENTAL AND SEASONAL ALLERGIES: Primary | ICD-10-CM

## 2025-06-30 PROCEDURE — 95117 IMMUNOTHERAPY INJECTIONS: CPT | Performed by: ALLERGY & IMMUNOLOGY

## 2025-07-09 ENCOUNTER — OFFICE VISIT (OUTPATIENT)
Dept: NEUROLOGY | Facility: CLINIC | Age: 70
End: 2025-07-09

## 2025-07-09 ENCOUNTER — LAB ENCOUNTER (OUTPATIENT)
Dept: LAB | Facility: HOSPITAL | Age: 70
End: 2025-07-09
Attending: Other
Payer: MEDICARE

## 2025-07-09 VITALS — SYSTOLIC BLOOD PRESSURE: 169 MMHG | DIASTOLIC BLOOD PRESSURE: 82 MMHG | HEART RATE: 65 BPM

## 2025-07-09 DIAGNOSIS — M48.062 SPINAL STENOSIS OF LUMBAR REGION WITH NEUROGENIC CLAUDICATION: ICD-10-CM

## 2025-07-09 DIAGNOSIS — M48.02 CERVICAL STENOSIS OF SPINAL CANAL: ICD-10-CM

## 2025-07-09 DIAGNOSIS — R27.8 SENSORY ATAXIA: Primary | ICD-10-CM

## 2025-07-09 DIAGNOSIS — R27.8 SENSORY ATAXIA: ICD-10-CM

## 2025-07-09 LAB — FOLATE SERPL-MCNC: 36.6 NG/ML (ref 5.4–?)

## 2025-07-09 PROCEDURE — 36415 COLL VENOUS BLD VENIPUNCTURE: CPT

## 2025-07-09 PROCEDURE — 82746 ASSAY OF FOLIC ACID SERUM: CPT

## 2025-07-09 PROCEDURE — 99204 OFFICE O/P NEW MOD 45 MIN: CPT | Performed by: OTHER

## 2025-07-09 PROCEDURE — 84425 ASSAY OF VITAMIN B-1: CPT

## 2025-07-09 RX ORDER — ACETAMINOPHEN 325 MG/1
650 TABLET ORAL EVERY 4 HOURS PRN
COMMUNITY
Start: 2025-05-19

## 2025-07-10 ENCOUNTER — APPOINTMENT (OUTPATIENT)
Dept: URBAN - METROPOLITAN AREA CLINIC 244 | Age: 70
Setting detail: DERMATOLOGY
End: 2025-07-10

## 2025-07-10 DIAGNOSIS — Z12.83 ENCOUNTER FOR SCREENING FOR MALIGNANT NEOPLASM OF SKIN: ICD-10-CM

## 2025-07-10 DIAGNOSIS — L57.0 ACTINIC KERATOSIS: ICD-10-CM

## 2025-07-10 DIAGNOSIS — L82.1 OTHER SEBORRHEIC KERATOSIS: ICD-10-CM

## 2025-07-10 DIAGNOSIS — L66.2 FOLLICULITIS DECALVANS: ICD-10-CM

## 2025-07-10 DIAGNOSIS — D22 MELANOCYTIC NEVI: ICD-10-CM

## 2025-07-10 DIAGNOSIS — L81.4 OTHER MELANIN HYPERPIGMENTATION: ICD-10-CM

## 2025-07-10 PROBLEM — D48.5 NEOPLASM OF UNCERTAIN BEHAVIOR OF SKIN: Status: ACTIVE | Noted: 2025-07-10

## 2025-07-10 PROBLEM — D23.72 OTHER BENIGN NEOPLASM OF SKIN OF LEFT LOWER LIMB, INCLUDING HIP: Status: ACTIVE | Noted: 2025-07-10

## 2025-07-10 PROBLEM — D22.9 MELANOCYTIC NEVI, UNSPECIFIED: Status: ACTIVE | Noted: 2025-07-10

## 2025-07-10 PROCEDURE — OTHER MIPS QUALITY: OTHER

## 2025-07-10 PROCEDURE — 17003 DESTRUCT PREMALG LES 2-14: CPT

## 2025-07-10 PROCEDURE — 99214 OFFICE O/P EST MOD 30 MIN: CPT | Mod: 25

## 2025-07-10 PROCEDURE — OTHER BIOPSY BY SHAVE METHOD: OTHER

## 2025-07-10 PROCEDURE — 17000 DESTRUCT PREMALG LESION: CPT | Mod: 59

## 2025-07-10 PROCEDURE — OTHER LIQUID NITROGEN: OTHER

## 2025-07-10 PROCEDURE — OTHER COUNSELING: OTHER

## 2025-07-10 PROCEDURE — 11102 TANGNTL BX SKIN SINGLE LES: CPT

## 2025-07-10 PROCEDURE — OTHER PRESCRIPTION: OTHER

## 2025-07-10 RX ORDER — CLINDAMYCIN PHOSPHATE 10 MG/G
GEL TOPICAL
Qty: 30 | Refills: 5 | Status: ERX | COMMUNITY
Start: 2025-07-10

## 2025-07-10 ASSESSMENT — LOCATION DETAILED DESCRIPTION DERM
LOCATION DETAILED: LEFT SUPERIOR MEDIAL BUCCAL CHEEK
LOCATION DETAILED: LEFT SUPERIOR MEDIAL LOWER BACK
LOCATION DETAILED: RIGHT SUPERIOR PARIETAL SCALP
LOCATION DETAILED: RIGHT CENTRAL MALAR CHEEK

## 2025-07-10 ASSESSMENT — LOCATION SIMPLE DESCRIPTION DERM
LOCATION SIMPLE: LEFT LOWER BACK
LOCATION SIMPLE: SCALP
LOCATION SIMPLE: LEFT CHEEK
LOCATION SIMPLE: RIGHT CHEEK

## 2025-07-10 ASSESSMENT — LOCATION ZONE DERM
LOCATION ZONE: TRUNK
LOCATION ZONE: FACE
LOCATION ZONE: SCALP

## 2025-07-12 LAB — VITAMIN B1 WHOLE BLD: 175.9 NMOL/L

## 2025-07-17 ENCOUNTER — MED REC SCAN ONLY (OUTPATIENT)
Dept: FAMILY MEDICINE CLINIC | Facility: CLINIC | Age: 70
End: 2025-07-17

## 2025-07-28 ENCOUNTER — NURSE ONLY (OUTPATIENT)
Dept: ALLERGY | Facility: CLINIC | Age: 70
End: 2025-07-28

## 2025-07-28 DIAGNOSIS — J30.89 ENVIRONMENTAL AND SEASONAL ALLERGIES: Primary | ICD-10-CM

## 2025-08-04 RX ORDER — METFORMIN HYDROCHLORIDE 500 MG/1
2000 TABLET, EXTENDED RELEASE ORAL EVERY EVENING
Qty: 360 TABLET | Refills: 0 | Status: SHIPPED | OUTPATIENT
Start: 2025-08-04

## 2025-08-05 ENCOUNTER — HOSPITAL ENCOUNTER (OUTPATIENT)
Dept: MRI IMAGING | Age: 70
Discharge: HOME | End: 2025-08-05
Attending: Other

## 2025-08-05 DIAGNOSIS — R27.8 SENSORY ATAXIA: ICD-10-CM

## 2025-08-05 DIAGNOSIS — M48.062 SPINAL STENOSIS OF LUMBAR REGION WITH NEUROGENIC CLAUDICATION: ICD-10-CM

## 2025-08-05 DIAGNOSIS — M48.02 CERVICAL STENOSIS OF SPINAL CANAL: ICD-10-CM

## 2025-08-05 PROCEDURE — 72141 MRI NECK SPINE W/O DYE: CPT | Performed by: OTHER

## 2025-08-05 PROCEDURE — 72148 MRI LUMBAR SPINE W/O DYE: CPT | Performed by: OTHER

## 2025-08-06 ENCOUNTER — RESULTS FOLLOW-UP (OUTPATIENT)
Dept: NEUROLOGY | Facility: CLINIC | Age: 70
End: 2025-08-06

## 2025-08-06 DIAGNOSIS — M48.02 CERVICAL SPINAL STENOSIS: Primary | ICD-10-CM

## 2025-08-25 ENCOUNTER — NURSE ONLY (OUTPATIENT)
Dept: ALLERGY | Facility: CLINIC | Age: 70
End: 2025-08-25

## 2025-08-25 DIAGNOSIS — J30.89 ENVIRONMENTAL AND SEASONAL ALLERGIES: Primary | ICD-10-CM

## (undated) DEVICE — NEEDLE SPINAL 20X3-1/2 YELLOW

## (undated) DEVICE — Device: Brand: MEDEX

## (undated) DEVICE — OMNIPAQUE 240ML VIAL

## (undated) DEVICE — 3 ML SYRINGE LUER-LOCK TIP: Brand: MONOJECT

## (undated) DEVICE — 6 ML SYRINGE LUER-LOCK TIP: Brand: MONOJECT

## (undated) DEVICE — CHLORAPREP ORANGE TINT 10.5ML

## (undated) DEVICE — NEEDLE HPO 18GA 1.5IN ECLPS

## (undated) DEVICE — GAUZE SPONGES,12 PLY: Brand: CURITY

## (undated) DEVICE — PERIFIX® 18 GA. X 3-1/2 IN. (90 MM) TUOHY, 5 ML GLASS LUER LOCK LOR TRAY (KIT): Brand: PERIFIX®

## (undated) DEVICE — ENCORE® LATEX ACCLAIM SIZE 7.5, STERILE LATEX POWDER-FREE SURGICAL GLOVE: Brand: ENCORE

## (undated) DEVICE — SYRINGE MNJCT 10ML LF STRL REG

## (undated) DEVICE — STANDARD HYPODERMIC NEEDLE,POLYPROPYLENE HUB: Brand: MONOJECT

## (undated) NOTE — MR AVS SNAPSHOT
[facility] [place] [facility] [place] [address] [id number]  [phone]               Thank you for choosing us for your health care visit with Yennifer Guerrero MD.  We are glad to serve you and happy to provide you with this summary of y Test twice a day   Commonly known as:  [employer] CONTOUR TEST           MetFORMIN HCl  MG Tb24   TAKE 4 TABLETS BY MOUTH EVERY EVENING   What changed:  Another medication with the same name was removed.  Continue taking this medication, and follow the dir office, you can view your past visit information in [employer] by going to Visits < Visit Summaries. [employer] questions? Call [phone] for help. [employer] is NOT to be used for urgent needs. For medical emergencies, dial 911.            Visit [facility]

## (undated) NOTE — MR AVS SNAPSHOT
[facility] [place] [address] [address]  [phone]               Thank you for choosing us for your health care visit with Padmini Britt MD.  We are glad to serve you and happy to provide you with this summary of y Take 1 tablet (25 mg total) by mouth daily. Commonly known as: Toprol XL           Montelukast Sodium 10 MG Tabs   TAKE 1 TABLET BY MOUTH NIGHTLY. Commonly known as:  SINGULAIR           MULTIVITAMIN ADULT OR   Take by mouth.            [place] Visits < Visit Summaries. MyChart questions? Call [phone] for help. ALGAentishart is NOT to be used for urgent needs. For medical emergencies, dial 911.            Visit [employer] online at  [email or web address]

## (undated) NOTE — ED AVS SNAPSHOT
[facility] Emergency Department    [address]     [address] [place] [address]    Phone:  [phone]    Fax:  [place] [address]   MRN: [id number]    Department:  [facility] Emergency Department   Date of Visit:  2/24 coverage and benefits available for follow-up care and referrals. If you have difficulty scheduling your follow-up appointment as directed, please call our  at [phone].      Si tiene problemas para programar flex eleuterio de seguimiento s different from what your Primary Care doctor has instructed you - please continue to take your medications as instructed by your Primary Care doctor until you can check with your doctor. Please bring the medication list to your next doctor's appointment. can help with your Affordable Care Act coverage, as well as all types of Medicaid plans. To get signed up and covered, please call [phone] and ask to get set up for an insurance coverage that is in-network with [facility]

## (undated) NOTE — MR AVS SNAPSHOT
[facility]  [address] [place] [id number] [phone]               Thank you for choosing us for your health [facility]. We are glad to serve you and happy to provide you with this summary of your visit.   Pl Take 81 mg by mouth daily. SEPIDEH MICROLET LANCETS Misc   Test twice a day           glipiZIDE 10 MG Tabs   TAKE 1 TABLET (10 MG TOTAL) BY MOUTH 2 (TWO) TIMES DAILY BEFORE MEALS.    Commonly known as:  GLUCOTROL           Glucose Blood Strp   Test view more details from this visit by going to [email or web address]. If you've recently had a stay at the Hospital you can access your discharge instructions in [place] by going to Visits < Admission Summaries.  If you've been to the Emergency Depar priority on exercise in your life                    Visit [facility] online at  [email or web address]

## (undated) NOTE — MR AVS SNAPSHOT
[facility] SPECIALTY [facility] [place] [address] [address]  [phone]               Thank you for choosing us for your health care visit with Forrest Blair MD.  We are glad to serve you and happy to provide you with this summary of y Commonly known as:  FORTAMET           Metoprolol Succinate ER 50 MG Tb24   Take 1 tablet (50 mg total) by mouth daily. Commonly known as: Toprol XL           Montelukast Sodium 10 MG Tabs   TAKE 1 TABLET BY MOUTH NIGHTLY.    Commonly known as:  Angel Steiner Call [phone] for help. OVIA is NOT to be used for urgent needs. For medical emergencies, dial 911.            Visit [facility] online at  [email or web address]

## (undated) NOTE — MR AVS SNAPSHOT
[facility] [place] [address] [address]  [phone]               Thank you for choosing us for your health care visit with Cherylene Allegra, MD.  We are glad to serve you and happy to provide you with this summary of y Commonly known as:  FORTAMET           Montelukast Sodium 10 MG Tabs   TAKE 1 TABLET BY MOUTH NIGHTLY. Commonly known as:  SINGULAIR           MULTIVITAMIN ADULT OR   Take by mouth. Omega-3 Fish Oil 1200 MG Caps   Take  by mouth.            [employer] Visits < Visit Summaries. MyChart questions? Call [phone] for help. Nav[employer]art is NOT to be used for urgent needs. For medical emergencies, dial 911.            Visit [facility] Foundation[facility] online at  [email or web address]

## (undated) NOTE — ED AVS SNAPSHOT
[facility] Emergency Department    [address]     [address] [place] [address]    Phone:  [phone]    Fax:  [place] [address]   MRN: [id number]    Department:  [facility] Emergency Department   Date of Visit:  2/24 and Class Registration line at [phone] or find a doctor online by visiting [email or web address].    IF THERE IS ANY CHANGE OR WORSENING OF YOUR CONDITION, CALL YOUR PRIMARY CARE PHYSICIAN AT ONCE OR RETURN IMMEDIATELY TO [address] [place] [address].     If